# Patient Record
Sex: MALE | Race: WHITE | HISPANIC OR LATINO | Employment: FULL TIME | URBAN - METROPOLITAN AREA
[De-identification: names, ages, dates, MRNs, and addresses within clinical notes are randomized per-mention and may not be internally consistent; named-entity substitution may affect disease eponyms.]

---

## 2018-06-15 ENCOUNTER — TELEPHONE (OUTPATIENT)
Dept: TRANSPLANT | Facility: CLINIC | Age: 53
End: 2018-06-15

## 2018-06-15 NOTE — TELEPHONE ENCOUNTER
Received email communication from Jyoti Caal that the patient had a Tace on 6/11/2018.  Replied that the patient should have a follow up surveillance CT Scan one month later with labs including CMP, INR, and AFP.   CT scan imaging along with report should be sent to [facility] for review at IR conference.

## 2018-07-02 DIAGNOSIS — K70.30 ALCOHOLIC CIRRHOSIS OF LIVER WITHOUT ASCITES: ICD-10-CM

## 2018-07-02 DIAGNOSIS — C22.0 HCC (HEPATOCELLULAR CARCINOMA): Primary | ICD-10-CM

## 2018-07-02 DIAGNOSIS — Z76.82 ORGAN TRANSPLANT CANDIDATE: ICD-10-CM

## 2018-07-16 ENCOUNTER — SOCIAL WORK (OUTPATIENT)
Dept: TRANSPLANT | Facility: CLINIC | Age: 53
End: 2018-07-16
Payer: COMMERCIAL

## 2018-07-16 ENCOUNTER — LAB VISIT (OUTPATIENT)
Dept: LAB | Facility: HOSPITAL | Age: 53
End: 2018-07-16
Attending: INTERNAL MEDICINE
Payer: COMMERCIAL

## 2018-07-16 ENCOUNTER — OFFICE VISIT (OUTPATIENT)
Dept: TRANSPLANT | Facility: CLINIC | Age: 53
End: 2018-07-16
Payer: COMMERCIAL

## 2018-07-16 VITALS
RESPIRATION RATE: 16 BRPM | HEART RATE: 57 BPM | BODY MASS INDEX: 23.23 KG/M2 | HEIGHT: 72 IN | WEIGHT: 171.5 LBS | TEMPERATURE: 98 F | DIASTOLIC BLOOD PRESSURE: 71 MMHG | OXYGEN SATURATION: 100 % | SYSTOLIC BLOOD PRESSURE: 126 MMHG

## 2018-07-16 DIAGNOSIS — K70.30 ALCOHOLIC CIRRHOSIS OF LIVER WITHOUT ASCITES: ICD-10-CM

## 2018-07-16 DIAGNOSIS — Z76.82 ORGAN TRANSPLANT CANDIDATE: ICD-10-CM

## 2018-07-16 DIAGNOSIS — D73.1 THROMBOCYTOPENIA DUE TO HYPERSPLENISM: ICD-10-CM

## 2018-07-16 DIAGNOSIS — D69.59 THROMBOCYTOPENIA DUE TO HYPERSPLENISM: ICD-10-CM

## 2018-07-16 DIAGNOSIS — C22.0 HCC (HEPATOCELLULAR CARCINOMA): ICD-10-CM

## 2018-07-16 DIAGNOSIS — K76.6 PORTAL HYPERTENSION: ICD-10-CM

## 2018-07-16 DIAGNOSIS — C22.0 HCC (HEPATOCELLULAR CARCINOMA): Primary | ICD-10-CM

## 2018-07-16 LAB
A1AT SERPL-MCNC: 121 MG/DL
ABO + RH BLD: NORMAL
AFP SERPL-MCNC: 3.3 NG/ML
ALBUMIN SERPL BCP-MCNC: 2.8 G/DL
ALP SERPL-CCNC: 99 U/L
ALT SERPL W/O P-5'-P-CCNC: 62 U/L
AMPHET+METHAMPHET UR QL: NEGATIVE
ANION GAP SERPL CALC-SCNC: 7 MMOL/L
AST SERPL-CCNC: 86 U/L
BACTERIA #/AREA URNS AUTO: NORMAL /HPF
BARBITURATES UR QL SCN>200 NG/ML: NEGATIVE
BASOPHILS # BLD AUTO: 0.03 K/UL
BASOPHILS NFR BLD: 0.6 %
BENZODIAZ UR QL SCN>200 NG/ML: NEGATIVE
BILIRUB DIRECT SERPL-MCNC: 1.5 MG/DL
BILIRUB SERPL-MCNC: 3 MG/DL
BILIRUB UR QL STRIP: NEGATIVE
BLD GP AB SCN CELLS X3 SERPL QL: NORMAL
BUN SERPL-MCNC: 11 MG/DL
BZE UR QL SCN: NEGATIVE
CALCIUM SERPL-MCNC: 9.2 MG/DL
CANNABINOIDS UR QL SCN: NEGATIVE
CHLORIDE SERPL-SCNC: 98 MMOL/L
CLARITY UR REFRACT.AUTO: CLEAR
CO2 SERPL-SCNC: 28 MMOL/L
COLOR UR AUTO: ABNORMAL
COMPLEXED PSA SERPL-MCNC: 0.08 NG/ML
CREAT SERPL-MCNC: 1 MG/DL
CREAT UR-MCNC: 158 MG/DL
DIFFERENTIAL METHOD: ABNORMAL
EOSINOPHIL # BLD AUTO: 0.5 K/UL
EOSINOPHIL NFR BLD: 9.4 %
ERYTHROCYTE [DISTWIDTH] IN BLOOD BY AUTOMATED COUNT: 15.2 %
EST. GFR  (AFRICAN AMERICAN): >60 ML/MIN/1.73 M^2
EST. GFR  (NON AFRICAN AMERICAN): >60 ML/MIN/1.73 M^2
ESTIMATED AVG GLUCOSE: 174 MG/DL
ETHANOL UR-MCNC: <10 MG/DL
FERRITIN SERPL-MCNC: 57 NG/ML
GGT SERPL-CCNC: 189 U/L
GLUCOSE SERPL-MCNC: 347 MG/DL
GLUCOSE UR QL STRIP: ABNORMAL
HBA1C MFR BLD HPLC: 7.7 %
HBV CORE AB SERPL QL IA: NEGATIVE
HBV SURFACE AB SER-ACNC: NEGATIVE M[IU]/ML
HBV SURFACE AG SERPL QL IA: NEGATIVE
HCT VFR BLD AUTO: 37.5 %
HCV AB SERPL QL IA: NEGATIVE
HEPATITIS A ANTIBODY, IGG: POSITIVE
HGB BLD-MCNC: 12.8 G/DL
HGB UR QL STRIP: NEGATIVE
HIV 1+2 AB+HIV1 P24 AG SERPL QL IA: NEGATIVE
IMM GRANULOCYTES # BLD AUTO: 0.01 K/UL
IMM GRANULOCYTES NFR BLD AUTO: 0.2 %
INR PPP: 1.2
IRON SERPL-MCNC: 124 UG/DL
KETONES UR QL STRIP: NEGATIVE
LEUKOCYTE ESTERASE UR QL STRIP: NEGATIVE
LYMPHOCYTES # BLD AUTO: 0.5 K/UL
LYMPHOCYTES NFR BLD: 9.6 %
MCH RBC QN AUTO: 33 PG
MCHC RBC AUTO-ENTMCNC: 34.1 G/DL
MCV RBC AUTO: 97 FL
METHADONE UR QL SCN>300 NG/ML: NEGATIVE
MICROSCOPIC COMMENT: NORMAL
MONOCYTES # BLD AUTO: 0.5 K/UL
MONOCYTES NFR BLD: 10 %
NEUTROPHILS # BLD AUTO: 3.4 K/UL
NEUTROPHILS NFR BLD: 70.2 %
NITRITE UR QL STRIP: NEGATIVE
NRBC BLD-RTO: 0 /100 WBC
OPIATES UR QL SCN: NEGATIVE
PCP UR QL SCN>25 NG/ML: NEGATIVE
PH UR STRIP: 6 [PH] (ref 5–8)
PLATELET # BLD AUTO: 49 K/UL
PMV BLD AUTO: 11.8 FL
POTASSIUM SERPL-SCNC: 3.9 MMOL/L
PROT SERPL-MCNC: 6 G/DL
PROT UR QL STRIP: NEGATIVE
PROTHROMBIN TIME: 12.4 SEC
RBC # BLD AUTO: 3.88 M/UL
RBC #/AREA URNS AUTO: 3 /HPF (ref 0–4)
SATURATED IRON: 35 %
SODIUM SERPL-SCNC: 133 MMOL/L
SP GR UR STRIP: 1.03 (ref 1–1.03)
TOTAL IRON BINDING CAPACITY: 351 UG/DL
TOXICOLOGY INFORMATION: NORMAL
TRANSFERRIN SERPL-MCNC: 237 MG/DL
TSH SERPL DL<=0.005 MIU/L-ACNC: 1.07 UIU/ML
URN SPEC COLLECT METH UR: ABNORMAL
UROBILINOGEN UR STRIP-ACNC: 4 EU/DL
WBC # BLD AUTO: 4.81 K/UL
WBC #/AREA URNS AUTO: 0 /HPF (ref 0–5)
YEAST UR QL AUTO: NORMAL

## 2018-07-16 PROCEDURE — 83540 ASSAY OF IRON: CPT | Mod: TXP

## 2018-07-16 PROCEDURE — 99205 OFFICE O/P NEW HI 60 MIN: CPT | Mod: S$GLB,TXP,, | Performed by: INTERNAL MEDICINE

## 2018-07-16 PROCEDURE — 82977 ASSAY OF GGT: CPT | Mod: TXP

## 2018-07-16 PROCEDURE — 82103 ALPHA-1-ANTITRYPSIN TOTAL: CPT | Mod: TXP

## 2018-07-16 PROCEDURE — 86787 VARICELLA-ZOSTER ANTIBODY: CPT | Mod: TXP

## 2018-07-16 PROCEDURE — 86706 HEP B SURFACE ANTIBODY: CPT | Mod: TXP

## 2018-07-16 PROCEDURE — 86592 SYPHILIS TEST NON-TREP QUAL: CPT | Mod: TXP

## 2018-07-16 PROCEDURE — 80053 COMPREHEN METABOLIC PANEL: CPT | Mod: TXP

## 2018-07-16 PROCEDURE — 84443 ASSAY THYROID STIM HORMONE: CPT | Mod: TXP

## 2018-07-16 PROCEDURE — 82728 ASSAY OF FERRITIN: CPT | Mod: TXP

## 2018-07-16 PROCEDURE — 86790 VIRUS ANTIBODY NOS: CPT | Mod: TXP

## 2018-07-16 PROCEDURE — 99999 PR PBB SHADOW E&M-EST. PATIENT-LVL III: CPT | Mod: PBBFAC,TXP,, | Performed by: INTERNAL MEDICINE

## 2018-07-16 PROCEDURE — 84153 ASSAY OF PSA TOTAL: CPT | Mod: TXP

## 2018-07-16 PROCEDURE — 86850 RBC ANTIBODY SCREEN: CPT | Mod: TXP

## 2018-07-16 PROCEDURE — 82248 BILIRUBIN DIRECT: CPT | Mod: TXP

## 2018-07-16 PROCEDURE — 86644 CMV ANTIBODY: CPT | Mod: TXP

## 2018-07-16 PROCEDURE — 80321 ALCOHOLS BIOMARKERS 1OR 2: CPT | Mod: TXP

## 2018-07-16 PROCEDURE — 82105 ALPHA-FETOPROTEIN SERUM: CPT | Mod: TXP

## 2018-07-16 PROCEDURE — 86682 HELMINTH ANTIBODY: CPT | Mod: TXP

## 2018-07-16 PROCEDURE — 86682 HELMINTH ANTIBODY: CPT | Mod: 91,TXP

## 2018-07-16 PROCEDURE — 81001 URINALYSIS AUTO W/SCOPE: CPT | Mod: TXP

## 2018-07-16 PROCEDURE — 85025 COMPLETE CBC W/AUTO DIFF WBC: CPT | Mod: TXP

## 2018-07-16 PROCEDURE — 87340 HEPATITIS B SURFACE AG IA: CPT | Mod: TXP

## 2018-07-16 PROCEDURE — 86703 HIV-1/HIV-2 1 RESULT ANTBDY: CPT | Mod: TXP

## 2018-07-16 PROCEDURE — 83036 HEMOGLOBIN GLYCOSYLATED A1C: CPT | Mod: TXP

## 2018-07-16 PROCEDURE — 85610 PROTHROMBIN TIME: CPT | Mod: TXP

## 2018-07-16 PROCEDURE — 86803 HEPATITIS C AB TEST: CPT | Mod: TXP

## 2018-07-16 PROCEDURE — 82104 ALPHA-1-ANTITRYPSIN PHENO: CPT | Mod: TXP

## 2018-07-16 PROCEDURE — 86704 HEP B CORE ANTIBODY TOTAL: CPT | Mod: TXP

## 2018-07-16 PROCEDURE — 36415 COLL VENOUS BLD VENIPUNCTURE: CPT | Mod: TXP

## 2018-07-16 PROCEDURE — 80307 DRUG TEST PRSMV CHEM ANLYZR: CPT | Mod: TXP

## 2018-07-16 RX ORDER — LACTULOSE 10 G/15ML
SOLUTION ORAL 2 TIMES DAILY
COMMUNITY
End: 2019-01-09 | Stop reason: SDUPTHER

## 2018-07-16 RX ORDER — FUROSEMIDE 40 MG/1
40 TABLET ORAL DAILY
Status: ON HOLD | COMMUNITY
End: 2019-05-04 | Stop reason: SDUPTHER

## 2018-07-16 RX ORDER — INSULIN GLARGINE 100 [IU]/ML
5 INJECTION, SOLUTION SUBCUTANEOUS NIGHTLY
Status: ON HOLD | COMMUNITY
End: 2019-05-04 | Stop reason: SDUPTHER

## 2018-07-16 RX ORDER — NADOLOL 20 MG/1
20 TABLET ORAL DAILY
Status: ON HOLD | COMMUNITY
End: 2019-04-29 | Stop reason: HOSPADM

## 2018-07-16 RX ORDER — SPIRONOLACTONE 100 MG/1
100 TABLET, FILM COATED ORAL DAILY
Status: ON HOLD | COMMUNITY
End: 2019-04-29 | Stop reason: HOSPADM

## 2018-07-16 NOTE — PROGRESS NOTES
Transplant Recipient Adult Psychosocial Assessment-Utilizing  Services through [facility]    [place] [address]    Telephone Information:   Mobile [phone]   Home  [phone] (home)  Work  There is no work phone number on file.  E-mail  [email or web address]    Sex: male  YOB: 1965  Age: 52 y.o.    Encounter Date: 7/16/2018  [place] Citizen: yes  Primary Language: Togolese   Needed: yes; Pt and wife present along with translators from [facility]:  Lili    Emergency Contact:  Name: Rd Rogers  Relationship: wife  Address: same as patient  Phone Numbers:  [phone] (mobile)    Family/Social Support:   Number of dependents/: 0  Marital history: Pt has been  once to his wife Rd for approximately 27 years.  Other family dynamics: Pt and wife have one daughter Kellie Carr who is 24 years old.  Pt's mother passed away in 2012 at the age of 75 from heart disease.  Pt's father passed away in 2010 from complications associated with diabetes.  Pt has a total of 9 siblings with two of them living in the [facility] (1 sister Donita lives in [place], [place] and 1 brother Moo lives in [place]).  Pt also has one grandson who is 4 years old who's mother is the pt's daughter and lives with his mother within the pt's home.      Household Composition:  Name: Cesario Bailey  Age: 52  Relationship: patient  Does person drive? yes    Name: Rd Rogers   Age: n/a  Relationship: wife  Does person drive? yes    Name: Kellie Carr  Age: 24  Relationship: daughter  Does person drive? yes     Name: Toro Marrero  Age: 4  Relationship: grandson  Does person drive? no    Do you and your caregivers have access to reliable transportation? Yes;  while here in [place], pt and his wife will utilize taxi/uber services.  PRIMARY CAREGIVER:  Rd Rogers will be primary caregiver, phone number [phone].      provided in-depth information to patient and caregiver regarding pre- and post-transplant caregiver role.   strongly encourages patient and caregiver to have concrete plan regarding post-transplant care giving, including back-up caregiver(s) to ensure care giving needs are met as needed.    Patient and Caregiver states understanding all aspects of caregiver role/commitment and is able/willing/committed to being caregiver to the fullest extent necessary.    Patient and Caregiver verbalizes understanding of the education provided today and caregiver responsibilities.         remains available. Patient and Caregiver agree to contact  in a timely manner if concerns arise.      Able to take time off work without financial concerns: yes; pt stated he has accumulated over 60 days of vacation time along with sick time and 18 free days given by his employer.  Pt's wife stated she will be able to a leave from her job without incident.     Additional Significant Others who will Assist with Transplant:  Pt does not currently have a secondary caregiver plan.   encouraged him to speak with family/friends who would be able to travel to [place], if needed, to assist his primary caregiver in his care and contact  with the information as soon as obtained.  Later during the day, Tyesha of [facility] contacted  to report secondary caregiver information.  Pt advised Tyesha that his mother in law would be able to assist in his care as secondary caregiver.  However, Tyesha indicated the pt stated he would need to contact her back with his mother in law's telephone number and information.  Tyesha was asked to contact the  once the pt had provided all information as requested.     Living Will: no  Healthcare Power of : no  Advance Directives on file: <<no information> per medical  record.  Verbally reviewed LW/HCPA information.   provided patient with copy of LW/HCPA documents and provided education on completion of forms.    Living Donors: No.    Highest Education Level: High School (9-12) or GED; pt completed the 12th grade of high school  Reading Ability: 12th grade; pt explained he could read on a 12th grade and above grade level  Reports difficulty with: vision (pt wears readers); confusion when amonia levels are high  Learns Best By:  Verbal, reading and demonstration     Status: no  VA Benefits: no     Working for Income: yes  If yes, working activity level: Working Full Time  Patient is employed as a  with the [employer].  Pt has been employed with the department for 32 years.    Spouse/Significant Other Employment: Rd Rogers is employed as a  with the [employer].    Disabled: no    Monthly Income:  Salary/Wages: $3138  Able to afford all costs now and if transplanted, including medications: yes  Patient and Caregiver verbalizes understanding of personal responsibilities related to transplant costs and the importance of having a financial plan to ensure that patients transplant costs are fully covered.      provided fundraising information/education.  Patient and Caregiver verbalizes understanding.   remains available.    Insurance:   Payor/Plan Subscr  Sex Relation Sub. Ins. ID Effective Group Num   1. [employer]* GIOVANNAMARTINA GATICAL 1965 Male  [id number]                                    ATTN:MEENU MARES, [address]     Primary Insurance (for UNOS reporting): Private Insurance-Pending sale to [facility]  Secondary Insurance (for UNOS reporting): None  Patient and Caregiver verbalizes clear understanding that patient may experience difficulty obtaining and/or be denied insurance coverage post-surgery. This includes and is not  "limited to disability insurance, life insurance, health insurance, burial insurance, long term care insurance, and other insurances.    Patient and Caregiver also reports understanding that future health concerns related to or unrelated to transplantation may not be covered by patient's insurance.  Resources and information provided and reviewed.      Patient and Caregiver provides verbal permission to release any necessary information to outside resources for patient care and discharge planning.  Resources and information provided are reviewed.      Dialysis Adherence:  Patient reports denied receiving dialysis.     Infusion Service: patient utilizing? no  Home Health: patient utilizing? no  DME: no  Pulmonary/Cardiac Rehab: no   ADLS:  Pt is fully independent and is currently driving    Adherence:   Pt and wife agree pt maintains a positive adherence with all medical instructions, advice and/or medication regimens.  Adherence education and counseling provided.     Per History Section:  No past medical history on file.  Social History   Substance Use Topics    Smoking status: Not on file    Smokeless tobacco: Not on file    Alcohol use Not on file     History   Drug use: Unknown     History   Sexual Activity    Sexual activity: Not on file       Per Today's Psychosocial:  Tobacco: none, patient denies any use. Pt denies ever smoking cigarettes.  Alcohol: none, patient denies any use currently.  Pt's last drink was 5 years ago.  Pt began drinking at age 23 which consisted of at least 24 cans of beer and a pint of rum daily.  Pt stated he drank an enormous amount of beer until 5 years ago when he began to feel ill.  "I stopped drinking before my diagnosis as I did not feel well a lot of the times".  Pt denied receiving any treatment to aide him in sobriety.   Illicit Drugs/Non-prescribed Medications: none, patient denies any use.    Patient and Caregiver states clear understanding of the potential impact of " "substance use as it relates to transplant candidacy and is aware of possible random substance screening.  Substance abstinence/cessation counseling, education and resources provided and reviewed.     Arrests/DWI/Treatment/Rehab: yes    Psychiatric History:    Mental Health: Pt denied any mental health issues or concerns  Psychiatrist/Counselor: Pt stated his ammonia levels caused him to act "loopy" on the job once and he was referred to [facility] for counseling to rule out mental health problems.  Pt stated he attended four therapy sessions and was deemed competent once his job verified his condition was deemed medically induced.    Medications:  Pt denied being prescribed any psychotropics/anti-depressants  Suicide/Homicide Issues: Pt denied current/past SI/HI ideations   Safety at home: Pt denied any domestic or safety issues within his home setting.    Knowledge: Patient and Caregiver states having clear understanding and realistic expectations regarding the potential risks and potential benefits of organ transplantation and organ donation, agrees to discuss with health care team members and support system members and to utilize available resources and express questions and/or concerns in order to further facilitate the pt informed decision-making.  Resources and information provided and reviewed.     Patient and Caregiver is aware of [facility]'s affiliation and/or partnership with agencies in home health care, LTAC, SNF, [facility], and other hospitals and clinics.    Understanding: Patient and Caregiver reports having a clear understanding of the many lifetime commitments involved with being a transplant recipient, including costs, compliance, medications, lab work, procedures, appointments, concrete and financial planning, preparedness, timely and appropriate communication of concerns, abstinence (ETOH, tobacco, illicit non-prescribed drugs), adherence to all health care team recommendations, support system and caregiver " "involvement, appropriate and timely resource utilization and follow-through, mental health counseling as needed/recommended, and patient and caregiver responsibilities.  Social Service Handbook, resources and detailed educational information provided and reviewed.  Educational information provided.    Patient and Caregiver also reports current and expected compliance with health care regime and states having a clear understanding of the importance of compliance.      Patient and Caregiver reports a clear understanding that risks and benefits may be involved with organ transplantation and with organ donation.      Patient and Caregiver also reports clear understanding that psychosocial risk factors may affect patient, and include but are not limited to feelings of depression, generalized anxiety, anxiety regarding dependence on others, post traumatic stress disorder, feelings of guilt and other emotional and/or mental concerns, and/or exacerbation of existing mental health concerns.  Detailed resources provided and discussed.     Patient and Caregiver agrees to access appropriate resources in a timely manner as needed and/or as recommended, and to communicate concerns appropriately.  Patient and Caregiver also reports a clear understanding of treatment options available.      reviewed education, provided additional information, and answered questions.    Feelings or Concerns: "My concern is I have something I did not have wrong with me before-my liver.  I am concerned, but have no feelings about it as I understand my illness".    Coping: "I am very aware that my behavior contributed to my condition".  Pt reported he does not get sad or unhappy about his condition. As per pt's wife, "He is clinging to life and his grandson is giving him reason to live".    Goals: "Hope to God I can spend more time at home with my family/grandson and drink more water, soda and lemon juice!"  Patient referred to Vocational " Rehabilitation.    Interview Behavior: Patient and Caregiver presents as alert and oriented x 4, pleasant, good eye contact, well groomed, recall good, concentration/judgement good, average intelligence, calm, communicative, cooperative and asking and answering questions appropriately.          Transplant Social Work - Candidacy  Assessment/Plan:     Psychosocial Suitability: Suitability cannot be determined at this time as back-up caregiver plan is still pending. Once adequate backup caregiver plan confirmed, patient would be considered as a suitable candidate presenting as low risk.  Protective factors include:  Financial stability, no reported history of ETOH use in the last 5 years, and positive determination on continued sobriety.      Recommendations/Additional Comments:   -Secondary caregiver   -Local Lodging      Kenya Quintana LMSW

## 2018-07-16 NOTE — PROGRESS NOTES
Transplant Hepatology (Transplant Evaluation) Consult Note    Referring provider:   Chief complaint:   Chief Complaint   Patient presents with    Liver Transplant Pre-evaluation       HPI:  Cesario Bailey is a 52 y.o. male that presents to Transplant Hepatology Clinic for consultation of had no chief complaint listed for this encounter..  He  is accompanied by his wife.    Background  Patient was diagnosed with alcohol-induced cirrhosis about 5 years ago. He stopped drinking since. He developed ascites, that resolved later. He had an episode variceal bleed in 2016 - EVL done. Has been on nadolol since.   Liver decompensations have been controlled but developed HCC on surveillance screening - 2.1 cm with HCC features. He had TACE on 6/11/18 in [place].    PMH: DM2, denies HTN/CAD/heart problem  PSH: umbilical hernia repair x 2.     Alcohol: stopped alcohol use completely 5 years ago, used to drink a lot for many years. Rehab: no. Relapse: no.  Tobacco: no, never  THC: no  IVDU: no  Cocaine: no    Work: Police in [place]  Family: , 1 daughter: 23 y/o    MELD-Na score: 17 at 7/16/2018 11:51 AM  MELD score: 13 at 7/16/2018 11:51 AM  Calculated from:  Serum Creatinine: 1 mg/dL at 7/16/2018 11:51 AM  Serum Sodium: 133 mmol/L at 7/16/2018 11:51 AM  Total Bilirubin: 3 mg/dL at 7/16/2018 11:51 AM  INR(ratio): 1.2 at 7/16/2018 11:51 AM  Age: 52 years    Liver disease:                   Alcohol/HCC    Blood type:                       A+  HCC  2.1 cm  TACE in [place] - 6/11/18      MELD-Na:                         17  Child-Tidwell Class:             B    Transplant status:             not under evaluation    Cirrhosis is decompensated with:    Ascites:                                                      Yes, controlled on diuretics  Spontaneous bacterial peritonitis:              no  Hepatic Encephalopathy:                           Yes, grade 1  Portal bleeding:                                          yes  Hepatocellular  carcinoma:                          yes    Hepatorenal syndrome:                              no  Hyponatremia:                                            yes  Muscle wasting:                                          yes   Portal vein thrombosis:                               no    Screening:  Last EGD:  Last imaging study:         Patient Active Problem List   Diagnosis    HCC (hepatocellular carcinoma)    Alcoholic cirrhosis of liver without ascites    Organ transplant candidate    Portal hypertension    Thrombocytopenia due to hypersplenism       Past Medical History:   Diagnosis Date    Alcoholic cirrhosis     HCC (hepatocellular carcinoma)     Portal hypertension        No past surgical history on file.    No family history on file.    Social History     Social History    Marital status:      Spouse name: N/A    Number of children: N/A    Years of education: N/A     Social History Main Topics    Smoking status: Not on file    Smokeless tobacco: Not on file    Alcohol use Not on file    Drug use: Unknown    Sexual activity: Not on file     Other Topics Concern    Not on file     Social History Narrative    No narrative on file       Current Outpatient Prescriptions   Medication Sig Dispense Refill    furosemide (LASIX) 40 MG tablet Take 40 mg by mouth once daily.      insulin glargine (LANTUS) 100 unit/mL injection Inject 5 Units into the skin every evening.      insulin regular 100 unit/mL Inj injection Inject 20 Units into the skin 3 (three) times daily before meals.      lactulose (CHRONULAC) 20 gram/30 mL Soln 2 (two) times daily.      nadolol (CORGARD) 20 MG tablet Take 20 mg by mouth once daily.      rifAXIMin (XIFAXAN) 550 mg Tab Take 550 mg by mouth 2 (two) times daily.      spironolactone (ALDACTONE) 100 MG tablet Take 100 mg by mouth once daily.       No current facility-administered medications for this visit.        Review of patient's allergies indicates:   Allergen  Reactions    Aspirin             Vitals:    07/16/18 1259   BP: 126/71   Pulse: (!) 57   Resp: 16   Temp: 98.1 °F (36.7 °C)   TempSrc: Oral   SpO2: 100%   Weight: 77.8 kg (171 lb 8.3 oz)   Height: 6' (1.829 m)       Physical Exam   Constitutional: He is oriented to person, place, and time. He appears well-developed. No distress.   Chronically ill-appearing. Malnourished. Temporal wasting.     HENT:   Head: Normocephalic and atraumatic.   Mouth/Throat: Oropharynx is clear and moist.   Eyes: Conjunctivae are normal. No scleral icterus.   Neck: Normal range of motion.   Cardiovascular: Normal rate, regular rhythm, normal heart sounds and intact distal pulses.    Pulmonary/Chest: Effort normal and breath sounds normal. No respiratory distress. He has no wheezes. He has no rales.   Abdominal: Soft. Normal appearance and bowel sounds are normal. He exhibits no shifting dullness, no distension, no pulsatile liver, no fluid wave and no ascites. There is no splenomegaly or hepatomegaly. No hernia.   Musculoskeletal: He exhibits no edema.   Neurological: He is alert and oriented to person, place, and time. He is not disoriented.   Skin: Skin is warm and dry. No rash noted. He is not diaphoretic.   Psychiatric: He has a normal mood and affect. His behavior is normal. His mood appears not anxious. His affect is not inappropriate. He is not agitated. He is communicative. He is attentive.   Nursing note and vitals reviewed.      LABS: I personally reviewed pertinent laboratory findings.    Lab Results   Component Value Date    ALT 62 (H) 07/16/2018    AST 86 (H) 07/16/2018     (H) 07/16/2018    ALKPHOS 99 07/16/2018    BILITOT 3.0 (H) 07/16/2018       Lab Results   Component Value Date    WBC 4.81 07/16/2018    HGB 12.8 (L) 07/16/2018    HCT 37.5 (L) 07/16/2018    MCV 97 07/16/2018    PLT 49 (L) 07/16/2018       Lab Results   Component Value Date     (L) 07/16/2018    K 3.9 07/16/2018    CL 98 07/16/2018    CO2 28  07/16/2018    BUN 11 07/16/2018    CREATININE 1.0 07/16/2018    CALCIUM 9.2 07/16/2018    ANIONGAP 7 (L) 07/16/2018    ESTGFRAFRICA >60.0 07/16/2018    EGFRNONAA >60.0 07/16/2018       Lab Results   Component Value Date    INR 1.2 07/16/2018       No results found for: SMOOTHMUSCAB, MITOAB  Lab Results   Component Value Date    IRON 124 07/16/2018    TIBC 351 07/16/2018    FERRITIN 57 07/16/2018     Lab Results   Component Value Date    HEPBCAB Negative 07/16/2018    HEPCAB Negative 07/16/2018     Lab Results   Component Value Date    TSH 1.066 07/16/2018     No results found for: BRIGETTE    No results found for: ABORH        Lab Results   Component Value Date    HGBA1C 7.7 (H) 07/16/2018     No results found for: CHOL  No results found for: HDL  No results found for: LDLCALC  No results found for: TRIG  No results found for: CHOLHDL        Imaging:   I personally reviewed recent cardiology studies available on the chart and from outside medical records.    I personally reviewed recent imaging studies available on the chart and from outside medical records.        Assessment:  52 y.o. male presenting with     1. HCC (hepatocellular carcinoma)    2. Alcoholic cirrhosis of liver without ascites    3. Organ transplant candidate    4. Portal hypertension    5. Thrombocytopenia due to hypersplenism        MELD-Na score: 17 at 7/16/2018 11:51 AM  MELD score: 13 at 7/16/2018 11:51 AM  Calculated from:  Serum Creatinine: 1 mg/dL at 7/16/2018 11:51 AM  Serum Sodium: 133 mmol/L at 7/16/2018 11:51 AM  Total Bilirubin: 3 mg/dL at 7/16/2018 11:51 AM  INR(ratio): 1.2 at 7/16/2018 11:51 AM  Age: 52 years    Functional Status: 70% - Cares for self: unable to carry on normal activity or active work  Physical Capacity: No Limitations    Transplant Candidacy: Patient is a 52 y.o. male with end-stage liver disease secondary to Cirrhosis: Alcohol/HCC with MELD-Na: 17 and Child-Tidwell Class:B  here for evaluation for possible OLT. Based on  available information, patient is a potential liver transplant candidate. Patient will undergo liver transplant evaluation after financial approval is obtained. Patient will be presented to Liver Transplant Selection Committee after all tests and evaluations are complete.    Return in 3 months    Patient was advised, in presence of caregiver, not to consume any contents which contain alcohol:  Not to use any mouthwash which contains alcohol  To avoid any alcohol-containing OTC medications use  Not to consume Non-Alcoholic Beer as there is a small amount of alcohol in those beverages  Not to cook with any wine or alcohol.  Advised if patients tested positive for alcohol, they would be denied for liver transplant.  Advised all patients are randomly screen for illegal drugs and alcohol. Failure of compliance may result in denial for liver transplant.    Cirrhosis Counseling  - strict abstinence of alcohol use  - compliance with lactulose and rifaximin if you have developed hepatic encephalopathy (confusion due to high ammonia level)  - avoid non-steroidal anti-inflammatory drugs (NSAIDs) such as ibuprofen, Motrin, naprosyn, Alleve due to the risk of kidney damage  - can take acetaminophen (Tylenol), no more than 2000 mg per day  - low sodium (salt) 2 gram per day diet  - nutrition: 25-30kcal (calorie per body body weight in kilogram) per day  - no need to restrict protein in diet  - high protein diet: 1.2-1.5 gram/kg (protein per body weight in kilogram) per day to prevent muscle mass loss  - avoid fasting  - Late night snack with 50 gram of complex carbohydrates and protein  - resistance exercises for muscle strength  - avoid raw seafoods due to the risk of fatal Vibrio vulnificus infection  - ultrasound or MRI of the liver every 6 months for liver cancer screening (you are at risk of developing liver cancer due to scar tissue in the liver)  - Upper endoscopy every 1-2 years to screen for varices in the stomach and  foodpipe which can burst and cause fatal bleeding      I have sent communication to the referring physician and/or primary care provider.    A total of 60 minutes were spent face-to-face with the patient during this encounter and over half of that time was spent on counseling and coordination of care.  We discussed in depth the nature of the patient's liver disease, the management plan and liver transplant evaluation in details. I also educated the patient about lifestyle modifications which may improve hepatic steatosis, overweight/obesity, insulin resistance and high blood pressure issues. I have provided the patient with an opportunity to ask questions and have all questions answered to his satisfaction.       Oni Faulkner MD  Staff Physician  Hepatology and Liver Transplant  [facility]  [facility]

## 2018-07-16 NOTE — PATIENT INSTRUCTIONS
Cirrhosis Counseling  - strict abstinence of alcohol use  - compliance with lactulose and rifaximin if you have developed hepatic encephalopathy (confusion due to high ammonia level)  - avoid non-steroidal anti-inflammatory drugs (NSAIDs) such as ibuprofen, Motrin, naprosyn, Alleve due to the risk of kidney damage  - can take acetaminophen (Tylenol), no more than 2000 mg per day  - low sodium (salt) 2 gram per day diet  - nutrition: 25-30kcal (calorie per body body weight in kilogram) per day  - no need to restrict protein in diet  - high protein diet: 1.2-1.5 gram/kg (protein per body weight in kilogram) per day to prevent muscle mass loss: 75 grams of protein per day  - avoid fasting  - Late night snack with 50 gram of complex carbohydrates and protein  - resistance exercises for muscle strength  - avoid raw seafoods due to the risk of fatal Vibrio vulnificus infection  - CT or MRI of the liver every 6 months for liver cancer screening (you are at risk of developing liver cancer due to scar tissue in the liver)  - Upper endoscopy every 1-2 years to screen for varices in the stomach and foodpipe which can burst and cause fatal bleeding

## 2018-07-17 ENCOUNTER — TELEPHONE (OUTPATIENT)
Dept: TRANSPLANT | Facility: CLINIC | Age: 53
End: 2018-07-17

## 2018-07-17 ENCOUNTER — NUTRITION (OUTPATIENT)
Dept: TRANSPLANT | Facility: CLINIC | Age: 53
End: 2018-07-17
Payer: COMMERCIAL

## 2018-07-17 ENCOUNTER — HOSPITAL ENCOUNTER (OUTPATIENT)
Dept: RADIOLOGY | Facility: CLINIC | Age: 53
Discharge: HOME | End: 2018-07-17
Attending: INTERNAL MEDICINE
Payer: COMMERCIAL

## 2018-07-17 VITALS — HEIGHT: 66 IN | BODY MASS INDEX: 26.65 KG/M2 | WEIGHT: 165.81 LBS

## 2018-07-17 DIAGNOSIS — Z76.82 ORGAN TRANSPLANT CANDIDATE: ICD-10-CM

## 2018-07-17 DIAGNOSIS — K70.30 ALCOHOLIC CIRRHOSIS OF LIVER WITHOUT ASCITES: ICD-10-CM

## 2018-07-17 DIAGNOSIS — C22.0 HCC (HEPATOCELLULAR CARCINOMA): ICD-10-CM

## 2018-07-17 DIAGNOSIS — Z01.818 PRE-TRANSPLANT EVALUATION FOR LIVER TRANSPLANT: ICD-10-CM

## 2018-07-17 DIAGNOSIS — C22.0 HCC (HEPATOCELLULAR CARCINOMA): Primary | ICD-10-CM

## 2018-07-17 LAB
CMV IGG SERPL QL IA: NORMAL
RPR SER QL: NORMAL

## 2018-07-17 PROCEDURE — 77080 DXA BONE DENSITY AXIAL: CPT | Mod: 26,TXP,, | Performed by: INTERNAL MEDICINE

## 2018-07-17 PROCEDURE — 97802 MEDICAL NUTRITION INDIV IN: CPT | Mod: S$GLB,TXP,, | Performed by: DIETITIAN, REGISTERED

## 2018-07-17 PROCEDURE — 99999 PR PBB SHADOW E&M-EST. PATIENT-LVL I: CPT | Mod: PBBFAC,TXP,, | Performed by: DIETITIAN, REGISTERED

## 2018-07-17 PROCEDURE — 77080 DXA BONE DENSITY AXIAL: CPT | Mod: TC,TXP

## 2018-07-17 NOTE — TELEPHONE ENCOUNTER
(GENI) received a phone call from Tyesha of the [facility] [employer] assisting with translation regarding the patient's (pt) secondary caregiver information.  As per pt, secondary caregiver will be his mother-in-law Jessika Godinez ([phone]).  Tyesha attempted to contact Ms. Godinez by telephone and was able to leave a v/m asking that she contact Tyesha with regards to the pt.  Tyesha advised she would contact GENI when Ms. Godinez returned the call.  GENI remains available.

## 2018-07-17 NOTE — PROGRESS NOTES
"TRANSPLANT NUTRITIONAL ASSESSMENT    Referring Provider: Oni Faulkner MD    Reason for Visit: Pre-liver transplant work-up    Age: 52 y.o.  Sex: male    Patient Active Problem List   Diagnosis    HCC (hepatocellular carcinoma)    Alcoholic cirrhosis of liver without ascites    Organ transplant candidate    Portal hypertension    Thrombocytopenia due to hypersplenism     Past Medical History:   Diagnosis Date    Alcoholic cirrhosis     HCC (hepatocellular carcinoma)     Portal hypertension      Lab Results   Component Value Date     (H) 07/16/2018    K 3.9 07/16/2018    ALBUMIN 2.8 (L) 07/16/2018    HGBA1C 7.7 (H) 07/16/2018    CALCIUM 9.2 07/16/2018     Other Pertinent Labs: none    Current Outpatient Prescriptions   Medication Sig    furosemide (LASIX) 40 MG tablet Take 40 mg by mouth once daily.    insulin glargine (LANTUS) 100 unit/mL injection Inject 5 Units into the skin every evening.    insulin regular 100 unit/mL Inj injection Inject 20 Units into the skin 3 (three) times daily before meals.    lactulose (CHRONULAC) 20 gram/30 mL Soln 2 (two) times daily.    nadolol (CORGARD) 20 MG tablet Take 20 mg by mouth once daily.    rifAXIMin (XIFAXAN) 550 mg Tab Take 550 mg by mouth 2 (two) times daily.    spironolactone (ALDACTONE) 100 MG tablet Take 100 mg by mouth once daily.     No current facility-administered medications for this visit.      Allergies: Aspirin    Ht Readings from Last 1 Encounters:   07/17/18 5' 6.42" (1.687 m)     Wt Readings from Last 1 Encounters:   07/17/18 75.2 kg (165 lb 12.6 oz)      BMI: Body mass index is 26.42 kg/m².    Usual Weight: 165-170 lb  Weight Change/Time: about 5 yr at time of illness discovery/onset, pt wt around 220 lb, currently stable  Current Diet: Regular  Appetite/Current Intake: good   Exercise/Physical Activity: functional, no limitations, no exercise  Nutritional/Herbal Supplements: multivitamin  Chewing/Swallowing Problems: none  Symptoms: " none    Estimated Kcal Need: 9526-9249 kcal (25-30 kcal/kg)  Estimated Protein Need: 75-90 gm (1-1.2 gm/kg)    Nutritional History:   Pt present with spouse, international department  present as well. Pt has had generally stable weight, good appetite the past few years. Pt has tried to reduce sodium intake, control BG at home. BG runs 140-150 usually. Pt has had issues with ascites and edema in past but it is better controlled now. Pt has reduced portion size of food he eats. Pt was continuing to work before coming here for evaluation. Pt goes to work at 3:30am, he usually eats something around 5:30 am. This is usually from a place like [employer] where he will have a turkey sandwich w/ lettuce, tomato, onion and drink water  Or coffee w/ 1 sugar substitute and milk. On days he is off work he may sleep later and have eggs, ham, peppers, bread or oatmeal with milk when he wakes up.   Lunch may be skipped.   Snack is usually fresh fruit (apple, banana, plum, grapes)  2:30 pm meal: rice, meat, beans, vegetables; meat may be chicken, pork, beef, sometimes plantains too. They go out for a buffet about 1x every 2 months. Spouse cooks and pt cooks meals. They use a very little amount of salt in cooking, no adding salt to plate. Vegetables used are typically fresh with herbs and spices.   Snack before bed may be yogurt, fruit, cookie, cheese.   Beverages throughout the day include water, occasional soda.     Nutritional Diagnoses  Problem: food- and nutrition-related knowledge deficit  Etiology: r/t no prior edu on adequate protein intake per day  Symptoms: aeb questions from pt/caregiver    Educational Need? yes  Barriers: none identified  Discussed with: patient and spouse  Interventions: Patient taught nutrition information regarding Pre-liver transplant work-up.    Reviewed Low Sodium packet (low Na diet, foods recommended/not recommended, food label strategies, flavoring tips, & sample menu). (Lithuanian)  Provided  education on protein content in foods, goal intake per day, suggestions for ways to reach protein intake goal. (Syriac)  Monitor and control BG closely with diet and medication.  Stay active walking daily.   Goals/Recommendations: diet adherence and small frequent meals and snacks  Actions Taken: instruct/provide written information  Patient and/or family comprehend instructions: yes  Outcome: Verbalizes understanding  Monitoring: Follow up in clinic if needed. RD contact info provided.    Counseling Time: 30 minutes

## 2018-07-17 NOTE — TELEPHONE ENCOUNTER
(GENI) contacted Tyesha ([id number]) within the International Department of [facility] to inquire if the patient (pt) had called with an update on secondary caregiver information.  Tyesha advised the pt stated on yesterday he would visit her office later today to provide his mother in law's information and have Tyesha to contact the SW with mother in law on the line to assist in translation.  Tyesha stated she would contact SW later today to assist with call. SW remains available.

## 2018-07-18 ENCOUNTER — HOSPITAL ENCOUNTER (OUTPATIENT)
Dept: RADIOLOGY | Facility: HOSPITAL | Age: 53
Discharge: HOME | End: 2018-07-18
Attending: INTERNAL MEDICINE
Payer: COMMERCIAL

## 2018-07-18 ENCOUNTER — TELEPHONE (OUTPATIENT)
Dept: HEPATOLOGY | Facility: CLINIC | Age: 53
End: 2018-07-18

## 2018-07-18 ENCOUNTER — LAB VISIT (OUTPATIENT)
Dept: LAB | Facility: HOSPITAL | Age: 53
End: 2018-07-18
Attending: INTERNAL MEDICINE
Payer: COMMERCIAL

## 2018-07-18 DIAGNOSIS — K70.30 ALCOHOLIC CIRRHOSIS OF LIVER WITHOUT ASCITES: ICD-10-CM

## 2018-07-18 DIAGNOSIS — Z76.82 ORGAN TRANSPLANT CANDIDATE: ICD-10-CM

## 2018-07-18 DIAGNOSIS — C22.0 HCC (HEPATOCELLULAR CARCINOMA): ICD-10-CM

## 2018-07-18 LAB
ABO + RH BLD: NORMAL
BLD GP AB SCN CELLS X3 SERPL QL: NORMAL
STRONGYLOIDES ANTIBODY IGG: NEGATIVE
VARICELLA INTERPRETATION: POSITIVE
VARICELLA ZOSTER IGG: 2.33 ISR

## 2018-07-18 PROCEDURE — 25500020 PHARM REV CODE 255: Mod: TXP | Performed by: INTERNAL MEDICINE

## 2018-07-18 PROCEDURE — 74177 CT ABD & PELVIS W/CONTRAST: CPT | Mod: TC,TXP

## 2018-07-18 PROCEDURE — 71046 X-RAY EXAM CHEST 2 VIEWS: CPT | Mod: 26,TXP,, | Performed by: RADIOLOGY

## 2018-07-18 PROCEDURE — 71046 X-RAY EXAM CHEST 2 VIEWS: CPT | Mod: TC,FY,TXP

## 2018-07-18 PROCEDURE — 86901 BLOOD TYPING SEROLOGIC RH(D): CPT | Mod: TXP

## 2018-07-18 PROCEDURE — 74177 CT ABD & PELVIS W/CONTRAST: CPT | Mod: 26,TXP,, | Performed by: RADIOLOGY

## 2018-07-18 PROCEDURE — 71260 CT THORAX DX C+: CPT | Mod: 26,TXP,, | Performed by: RADIOLOGY

## 2018-07-18 PROCEDURE — 86480 TB TEST CELL IMMUN MEASURE: CPT | Mod: TXP

## 2018-07-18 RX ADMIN — IOHEXOL 75 ML: 350 INJECTION, SOLUTION INTRAVENOUS at 10:07

## 2018-07-19 ENCOUNTER — TELEPHONE (OUTPATIENT)
Dept: ENDOSCOPY | Facility: HOSPITAL | Age: 53
End: 2018-07-19

## 2018-07-19 ENCOUNTER — DOCUMENTATION ONLY (OUTPATIENT)
Dept: TRANSPLANT | Facility: CLINIC | Age: 53
End: 2018-07-19

## 2018-07-19 ENCOUNTER — HOSPITAL ENCOUNTER (OUTPATIENT)
Dept: CARDIOLOGY | Facility: CLINIC | Age: 53
Discharge: HOME | End: 2018-07-19
Attending: INTERNAL MEDICINE
Payer: COMMERCIAL

## 2018-07-19 DIAGNOSIS — Z12.11 COLON CANCER SCREENING: Primary | ICD-10-CM

## 2018-07-19 DIAGNOSIS — K74.60 HEPATIC CIRRHOSIS, UNSPECIFIED HEPATIC CIRRHOSIS TYPE, UNSPECIFIED WHETHER ASCITES PRESENT: ICD-10-CM

## 2018-07-19 DIAGNOSIS — C22.0 HCC (HEPATOCELLULAR CARCINOMA): ICD-10-CM

## 2018-07-19 DIAGNOSIS — K70.30 ALCOHOLIC CIRRHOSIS OF LIVER WITHOUT ASCITES: ICD-10-CM

## 2018-07-19 DIAGNOSIS — Z76.82 ORGAN TRANSPLANT CANDIDATE: ICD-10-CM

## 2018-07-19 LAB
A1AT PHENOTYP SERPL-IMP: NORMAL BANDS
A1AT SERPL NEPH-MCNC: 123 MG/DL
DIASTOLIC DYSFUNCTION: NO
ESTIMATED PA SYSTOLIC PRESSURE: 19.71
RETIRED EF AND QEF - SEE NOTES: 60 (ref 55–65)
SCHISTOSOMA IGG SER QL: NEGATIVE

## 2018-07-19 PROCEDURE — 93325 DOPPLER ECHO COLOR FLOW MAPG: CPT | Mod: S$GLB,TXP,, | Performed by: INTERNAL MEDICINE

## 2018-07-19 PROCEDURE — 96372 THER/PROPH/DIAG INJ SC/IM: CPT | Mod: 59,S$GLB,TXP, | Performed by: INTERNAL MEDICINE

## 2018-07-19 PROCEDURE — 93320 DOPPLER ECHO COMPLETE: CPT | Mod: S$GLB,TXP,, | Performed by: INTERNAL MEDICINE

## 2018-07-19 PROCEDURE — 93351 STRESS TTE COMPLETE: CPT | Mod: S$GLB,TXP,, | Performed by: INTERNAL MEDICINE

## 2018-07-19 RX ORDER — POLYETHYLENE GLYCOL 3350, SODIUM SULFATE ANHYDROUS, SODIUM BICARBONATE, SODIUM CHLORIDE, POTASSIUM CHLORIDE 236; 22.74; 6.74; 5.86; 2.97 G/4L; G/4L; G/4L; G/4L; G/4L
4 POWDER, FOR SOLUTION ORAL ONCE
Qty: 4000 ML | Refills: 0 | Status: SHIPPED | OUTPATIENT
Start: 2018-07-19 | End: 2018-07-21

## 2018-07-19 NOTE — NURSING
PRE EDUCATION TEACHING NOTE    Cesario Bailey was seen in clinic today.  Handbook on pre-liver transplant information (see outline below) was given to the patient and time was allowed for questions.  Patient's wife accompanied him.  Informed consent signed and written information given on selection criteria. , Ronald present for teaching.      LIVER TRANSPLANT WORK-UP EDUCATION  I. NATIONAL REGISTRY LISTING  A. Information for listing  B. Regions  C. Per UNOS, can be listed at more than one center  II. SURGERY  A. Length  B. Complications: bleeding, infection  C. Central lines, drains, Draper catheter, incision, endotracheal tube, NG tube  D. Transfusions, cell saver  III. SHORT TERM RECOVERY  A. ICU, PICU, Hospital stay  IV. LONG TERM RECOVERY  A. Labs at home  B. Clinic visits  C. Complications: infection, rejection, readmissions  D. Normal immunity and immunosuppression  E. Incidence of re-admit in 1st year  V. REJECTION  A. Incidence  B. Treatment: Solumedrol, Prograf, Thymoglobulin (actions and side effects)  VI. IMMUNOSUPPRESSIVES  A. Prednisone  B. Imuran/Cellcept  C. Cyclosporin/Prograf  D. Rapamune  E. Need for lifetime compliance  F. Actions and side effects  G. Costs  VII. RECURRENCE OF VIRAL HEPATITIS

## 2018-07-19 NOTE — TELEPHONE ENCOUNTER
Patient: Cesario Bailey       MRN: [id number]      : 1965     Age: 52 [place] [id number]    Provider: Hepatologist Luh Faulkner    Urgency of review: urgent    Patient Transplant Status: In Evaluation    Reason for presentation: Initial staging for transplant    Clinical Summary:   Patient was diagnosed with alcohol-induced cirrhosis about 5 years ago. He stopped drinking since. He developed ascites, that resolved later. He had an episode variceal bleed in 2016 - EVL done. Has been on nadolol since.   Liver decompensations have been controlled but developed HCC on surveillance screening - 2.1 cm with HCC features. He had TACE on 18 in [place]  AFP 3.3  Matt: 3  MELD: 17    Imaging to be reviewed: CT    HCC Treatment History: TACE 18    ABO: A POS    Platelets:   Lab Results   Component Value Date/Time    PLT 49 (L) 2018 11:51 AM     Creatinine:   Lab Results   Component Value Date/Time    CREATININE 1.0 2018 11:51 AM     Bilirubin:   Lab Results   Component Value Date/Time    BILITOT 3.0 (H) 2018 11:51 AM     AFP Last 3 each if available:   Lab Results   Component Value Date/Time    AFP 3.3 2018 11:51 AM       MELD: MELD-Na score: 17 at 2018 11:51 AM  MELD score: 13 at 2018 11:51 AM  Calculated from:  Serum Creatinine: 1 mg/dL at 2018 11:51 AM  Serum Sodium: 133 mmol/L at 2018 11:51 AM  Total Bilirubin: 3 mg/dL at 2018 11:51 AM  INR(ratio): 1.2 at 2018 11:51 AM  Age: 52 years    Plan:     Follow-up Provider:

## 2018-07-19 NOTE — TELEPHONE ENCOUNTER
Spoke with patient and International Interpretor Tyesha Estrada together as .    Pt scheduled for EGD/colonoscopy on 7/23/18  with an arrival time of 8:45am.  Lab work ordered STAT prior to procedure at 8:15am  . Prior to scheduling procedure Pt allergies, BMI, medical history and medications reviewed with Pt.      Prep instructions faxed to Noah Estrada at [phone] for her to give patient. Patient was instructed on prep instructions and where to pickup his prep and it was relayed via Tyesha Estrada .  Patient verbalized understanding and had no further questions.

## 2018-07-20 ENCOUNTER — EVALUATION (OUTPATIENT)
Dept: TRANSPLANT | Facility: CLINIC | Age: 53
End: 2018-07-20
Payer: COMMERCIAL

## 2018-07-20 ENCOUNTER — OFFICE VISIT (OUTPATIENT)
Dept: INFECTIOUS DISEASES | Facility: CLINIC | Age: 53
End: 2018-07-20
Payer: COMMERCIAL

## 2018-07-20 ENCOUNTER — TELEPHONE (OUTPATIENT)
Dept: TRANSPLANT | Facility: CLINIC | Age: 53
End: 2018-07-20

## 2018-07-20 ENCOUNTER — LAB VISIT (OUTPATIENT)
Dept: LAB | Facility: HOSPITAL | Age: 53
End: 2018-07-20
Payer: COMMERCIAL

## 2018-07-20 ENCOUNTER — CLINICAL SUPPORT (OUTPATIENT)
Dept: INFECTIOUS DISEASES | Facility: CLINIC | Age: 53
End: 2018-07-20
Payer: COMMERCIAL

## 2018-07-20 ENCOUNTER — HOSPITAL ENCOUNTER (OUTPATIENT)
Dept: RADIOLOGY | Facility: HOSPITAL | Age: 53
Discharge: HOME | End: 2018-07-20
Attending: INTERNAL MEDICINE
Payer: COMMERCIAL

## 2018-07-20 VITALS
HEART RATE: 67 BPM | HEIGHT: 66 IN | RESPIRATION RATE: 16 BRPM | BODY MASS INDEX: 26.79 KG/M2 | DIASTOLIC BLOOD PRESSURE: 52 MMHG | WEIGHT: 166.69 LBS | OXYGEN SATURATION: 99 % | TEMPERATURE: 99 F | SYSTOLIC BLOOD PRESSURE: 99 MMHG

## 2018-07-20 VITALS
BODY MASS INDEX: 26.74 KG/M2 | SYSTOLIC BLOOD PRESSURE: 98 MMHG | TEMPERATURE: 99 F | HEART RATE: 68 BPM | DIASTOLIC BLOOD PRESSURE: 54 MMHG | WEIGHT: 167.75 LBS

## 2018-07-20 DIAGNOSIS — Z76.82 ORGAN TRANSPLANT CANDIDATE: ICD-10-CM

## 2018-07-20 DIAGNOSIS — C22.0 HCC (HEPATOCELLULAR CARCINOMA): ICD-10-CM

## 2018-07-20 DIAGNOSIS — K70.30 ALCOHOLIC CIRRHOSIS OF LIVER WITHOUT ASCITES: ICD-10-CM

## 2018-07-20 DIAGNOSIS — Z76.82 ORGAN TRANSPLANT CANDIDATE: Primary | ICD-10-CM

## 2018-07-20 DIAGNOSIS — R91.1 LUNG NODULE SEEN ON IMAGING STUDY: ICD-10-CM

## 2018-07-20 DIAGNOSIS — Z01.818 PRE-TRANSPLANT EVALUATION FOR CHRONIC LIVER DISEASE: ICD-10-CM

## 2018-07-20 LAB
MITOGEN IGNF BCKGRD COR BLD-ACNC: 0.06 IU/ML
MITOGEN NIL: >10 IU/ML
TB GOLD PLUS: NEGATIVE
TB1 - NIL: -0.01 IU/ML
TB2 - NIL: -0.02 IU/ML

## 2018-07-20 PROCEDURE — 90715 TDAP VACCINE 7 YRS/> IM: CPT | Mod: S$GLB,TXP,, | Performed by: INTERNAL MEDICINE

## 2018-07-20 PROCEDURE — 99999 PR PBB SHADOW E&M-EST. PATIENT-LVL III: CPT | Mod: PBBFAC,TXP,, | Performed by: PHYSICIAN ASSISTANT

## 2018-07-20 PROCEDURE — 86698 HISTOPLASMA ANTIBODY: CPT | Mod: TXP

## 2018-07-20 PROCEDURE — 93975 VASCULAR STUDY: CPT | Mod: 26,TXP,, | Performed by: RADIOLOGY

## 2018-07-20 PROCEDURE — 86635 COCCIDIOIDES ANTIBODY: CPT | Mod: 91,TXP

## 2018-07-20 PROCEDURE — 99999 PR PBB SHADOW E&M-EST. PATIENT-LVL III: CPT | Mod: PBBFAC,TXP,,

## 2018-07-20 PROCEDURE — 36415 COLL VENOUS BLD VENIPUNCTURE: CPT | Mod: TXP

## 2018-07-20 PROCEDURE — 90471 IMMUNIZATION ADMIN: CPT | Mod: S$GLB,TXP,, | Performed by: INTERNAL MEDICINE

## 2018-07-20 PROCEDURE — 87449 NOS EACH ORGANISM AG IA: CPT | Mod: TXP

## 2018-07-20 PROCEDURE — 86635 COCCIDIOIDES ANTIBODY: CPT | Mod: TXP

## 2018-07-20 PROCEDURE — 86403 PARTICLE AGGLUT ANTBDY SCRN: CPT | Mod: TXP

## 2018-07-20 PROCEDURE — 90670 PCV13 VACCINE IM: CPT | Mod: S$GLB,TXP,, | Performed by: INTERNAL MEDICINE

## 2018-07-20 PROCEDURE — 99205 OFFICE O/P NEW HI 60 MIN: CPT | Mod: S$GLB,TXP,, | Performed by: TRANSPLANT SURGERY

## 2018-07-20 PROCEDURE — 99204 OFFICE O/P NEW MOD 45 MIN: CPT | Mod: S$GLB,TXP,, | Performed by: PHYSICIAN ASSISTANT

## 2018-07-20 PROCEDURE — 90472 IMMUNIZATION ADMIN EACH ADD: CPT | Mod: S$GLB,TXP,, | Performed by: INTERNAL MEDICINE

## 2018-07-20 PROCEDURE — 87385 HISTOPLASMA CAPSUL AG IA: CPT | Mod: TXP

## 2018-07-20 PROCEDURE — 76700 US EXAM ABDOM COMPLETE: CPT | Mod: 26,59,TXP, | Performed by: RADIOLOGY

## 2018-07-20 PROCEDURE — 86612 BLASTOMYCES ANTIBODY: CPT | Mod: TXP

## 2018-07-20 PROCEDURE — 93975 VASCULAR STUDY: CPT | Mod: TC,TXP

## 2018-07-20 NOTE — PROGRESS NOTES
Pre Transplant Infectious Diseases Consult  Liver Transplant Recipient Evaluation    Requesting Physician: Dr. Saskia Young/Dr. Romeo in ID    Reason for Visit:    Chief Complaint   Patient presents with    Liver/Kidney Transplant Pre-evaluation     History of Present Illness  Cesario Bailey is a 52 y.o. year old White male with advanced Liver disease currently being evaluated for Liver transplant. He had paracentesis 5 years ago and denies a history of abd infection/peritonitis.  Patient denies any recent fever, chills, or infective illnesses.      1) Do you have a history of:   YES NO   Diabetes      [x] []     Diabetic Foot Infection/Bone Infection  []        [x]     Surgical Removal of Spleen   []  [x]                  2) Have you had recurrent infections involving:             YES NO  Sinus infections  []         [x]   Sore Throat   []         [x]                 Prostate Infections  []         [x]              Bladder Infections  []         [x]                     Kidney Infections  []         [x]                               Intestinal Infections  []         [x]      Skin Infections   []         [x]       Reproductive Infections          []  [x]   Periodontal Disease  []         [x]        3)Have you ever had: YES     NO UNKNOWN      Chicken Pox   [x]         []  []   Shingles   []         [x]  []   Orolabial Herpes             []  [x]  []   Genital Herpes  []         [x]  []   Cytomegalovirus  []         [x]  []   Ras-Barr Virus  []         [x]  []   Genital Warts   []         [x]  []   Hepatitis A   []         [x]  []   Hepatitis B   []         [x]  []   Hepatitis C   []         [x]  []   Syphilis   []         [x]  []   Gonorrhea   []         [x]  []   Pelvic Inflammatory   Disease   []         [x]  []   Chlamydia Infection  []         [x]  []   Intestinal parasites   or worms   []         [x]  []   Fungal Infections  []         [x]  []   Blood Infections  []         [x]  []       Comment:        4) Have you ever been exposed   YES NO  To someone with tuberculosis?  []   [x]   If yes, what treatment did you receive:     5) What states have you lived in? [place]    6) What countries have you visited for more than 2 weeks?    [place], [place]                     YES NO  7) Did you have any associated infections?  []  [x]       8) Are you planning to travel outside the    [x]  []   [place] after your transplant?    9) Household                   YES NO  Do you have pets living in your house?    [x]         []   If yes, describe: 2 small, parrot - wife cleans     Do you spend time or live on a farm or     []         [x]   have livestock or other farm animals?  If yes, which ones:    Do you have a fish tank?          []  [x]       Do you have a litter box?      []         [x]     Do you fish or hunt?       []         [x]     Do you clean or skin fish or animals?    []         [x]     Do you consume raw or undercooked    []         [x]   meat, fish, or shellfish?      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          12)Do you garden or otherwise  YES NO   work in the soil?    [x]         []   13)Do you hike, camp, or spend     time in wooded areas?   []         [x]        14) The patient's immunization history was reviewed.    Have you ever received:  YES NO UNKNOWN DATES   Routine Childhood vaccines  [x]         []  []      Influenza vaccine   [x]  []  [] 2/2018   Pneumovax    []  [x]  []     Tetanus-diptheria   []         [x]  []    Hepatitis A vaccine series       [x]  []  []    Hepatitis B vaccine series         [x]  []  []    Meningitis vaccine   []         [x]  []    Varicella vaccine   []         [x]  []        Based on the patients immunization history and serologies, immunizations were ordered:         Ordered  Not Ordered  Influenza Vaccine     []    [x]   Hepatitis A series at 0,  6 months   []    [x]   Hepatitis B seriesat  0, 1, and 6 months  []    [x]   Hepatitis B High Dose 0,1, and 6 months  [x]    []   Prevnar      [x]    []   Pneumovax      [x]    []    TDap       [x]    []    Zoster       []    [x]   Menactra      []    [x]            The patient was encouraged to contact us about any problems that may develop after immunization and possible side effects were reviewed.      Previous Transplant: no    Etiology of Liver Disease: etoh    Allergies: Aspirin    There is no immunization history on file for this patient.  Past Medical History:   Diagnosis Date    Alcoholic cirrhosis     Encounter for blood transfusion     HCC (hepatocellular carcinoma)     Portal hypertension      Past Surgical History:   Procedure Laterality Date    HERNIA REPAIR        Social History     Social History    Marital status:      Spouse name: N/A    Number of children: N/A    Years of education: N/A     Occupational History    Not on file.     Social History Main Topics    Smoking status: Never Smoker    Smokeless tobacco: Never Used    Alcohol use Not on file    Drug use: Unknown    Sexual activity: Not on file     Other Topics Concern    Not on file     Social History Narrative    No narrative on file       Review of Systems   Constitution: Negative for chills, decreased appetite, fever, weakness, malaise/fatigue, night sweats, weight gain and weight loss.   HENT: Negative for congestion, ear pain, hearing loss, hoarse voice, sore throat and tinnitus.    Eyes: Positive for redness. Negative for blurred vision and visual disturbance.   Cardiovascular: Negative for chest pain, leg swelling and palpitations.   Respiratory: Negative for cough, hemoptysis, shortness of breath, sputum production and wheezing.    Endocrine: Negative for cold intolerance and heat intolerance.   Hematologic/Lymphatic: Negative for adenopathy. Bruises/bleeds easily.   Skin: Positive for dry skin. Negative for itching, rash and suspicious lesions.    Musculoskeletal: Negative for back pain, joint pain, myalgias and neck pain.   Gastrointestinal: Positive for constipation. Negative for abdominal pain, diarrhea, heartburn, nausea and vomiting.   Genitourinary: Negative for dysuria, flank pain, frequency, hematuria, hesitancy and urgency.   Neurological: Positive for paresthesias. Negative for dizziness, headaches and numbness.   Psychiatric/Behavioral: Negative for depression and memory loss. The patient has insomnia. The patient is not nervous/anxious.    Allergic/Immunologic: Negative for environmental allergies, HIV exposure, hives and persistent infections.     Physical Exam   Constitutional: He is oriented to person, place, and time. He appears well-developed and well-nourished.       HENT:   Head: Normocephalic and atraumatic.   Mouth/Throat: Uvula is midline, oropharynx is clear and moist and mucous membranes are normal. He does not have dentures. No oral lesions. Abnormal dentition (missing teeth). Dental caries (filled) present. No dental abscesses or lacerations.   Eyes: Conjunctivae and lids are normal. Pupils are equal, round, and reactive to light. No scleral icterus.   Neck: Neck supple.   Cardiovascular: Normal rate and regular rhythm.  Exam reveals no gallop and no friction rub.    No murmur heard.  Pulmonary/Chest: Effort normal and breath sounds normal. No respiratory distress. He has no decreased breath sounds. He has no wheezes. He has no rhonchi. He has no rales.   Abdominal: Soft. Normal appearance, normal aorta and bowel sounds are normal. He exhibits no distension and no mass. There is no hepatosplenomegaly. There is no tenderness. There is no rebound and no guarding.   Musculoskeletal: He exhibits no edema.   Lymphadenopathy:        Head (right side): No submental, no submandibular, no tonsillar, no preauricular, no posterior auricular and no occipital adenopathy present.        Head (left side): No submental, no submandibular, no  tonsillar, no preauricular, no posterior auricular and no occipital adenopathy present.     He has no cervical adenopathy.     He has no axillary adenopathy.        Right: No inguinal, no supraclavicular and no epitrochlear adenopathy present.        Left: No inguinal, no supraclavicular and no epitrochlear adenopathy present.   Neurological: He is alert and oriented to person, place, and time. No cranial nerve deficit.   Skin: Skin is warm, dry and intact. No lesion and no rash noted. He is not diaphoretic. No erythema. No pallor.   Psychiatric: He has a normal mood and affect. His behavior is normal.     Diagnostics:   RPR   Date Value Ref Range Status   07/16/2018 Non-reactive Non-reactive Final     No results found for: CMVANTIBODIE  No results found for: HIV1X2  No results found for: HTLVIIIANTIB  Hepatitis B Surface Ag   Date Value Ref Range Status   07/16/2018 Negative  Final     Hep B Core Total Ab   Date Value Ref Range Status   07/16/2018 Negative  Final     Hepatitis C Ab   Date Value Ref Range Status   07/16/2018 Negative  Final     No results found for: TOXOIGG  No components found for: TOXOIGGINTER  No results found for: MKZ5FSW  No results found for: VUK0HRJ  Varicella IgG   Date Value Ref Range Status   07/16/2018 2.33 (H) 0.00 - 0.90 ISR Final     Varicella Interpretation   Date Value Ref Range Status   07/16/2018 Positive (A) Negative Final     Comment:     <or=0.90     Negative        No detectable IgG antibody to Varicella zoster  by the TOM test. Such individuals are presumed to be   uninfected with Varicella zoster and to be susceptible to   primary infection.  0.91-1.09    Equivocal  >or=1.10     Positive        Indicates presence of detectable IgG antibody to Varicella   zoster by the TOM test. Indicative of previous or current   infection.       Strongyloides Ab IgG   Date Value Ref Range Status   07/16/2018 Negative Negative Final     Comment:     No detectable levels of IgG antibodies  to Strongyloides.  Repeat testing in 1-2 weeks if clinically indicated.  Test Performed by:  [facility]  [address], [place] [address]       No results found for: EPSTEINBARRV  Hep B S Ab   Date Value Ref Range Status   07/16/2018 Negative  Final     No results found for: QUANTIFERON  No results found for: HEPAIGM  No results found for: PPD  Results for HE VO (MRN [id number]) as of 7/20/2018 13:42   Ref. Range 7/16/2018 11:51 7/18/2018 08:07   Hep B Core Total Ab Unknown Negative    Hep B S Ab Unknown Negative    Hepatitis B Surface Ag Unknown Negative    GGT Latest Ref Range: 8 - 55 U/L 189 (H)    Hepatitis C Ab Unknown Negative    Mitogen - Nil Latest Ref Range: See text IU/mL  >10.000   NIL Latest Ref Range: See text IU/mL  0.060   TB Gold Plus Unknown  Negative   TB1 - Nil Latest Ref Range: See text IU/mL  -0.010   TB2 - Nil Latest Ref Range: See text IU/mL  -0.020   HIV 1/2 Ag/Ab Latest Ref Range: Negative  Negative    RPR Latest Ref Range: Non-reactive  Non-reactive    CMV IgG Interpretation Unknown Non-Reactive    Schistosoma IgG Unknown Negative    Strongyloides Ab IgG Latest Ref Range: Negative  Negative    Varicella IgG Latest Ref Range: 0.00 - 0.90 ISR 2.33 (H)    Varicella Interpretation Latest Ref Range: Negative  Positive (A)       Ref. Range 7/16/2018 11:51   Hepatitis A Antibody IgG Unknown Positive (A)     Imaging:  CT Chest Abdoment Pelvis With Contrast   Status: Final result   MyChart Results Release     MyChart Status: Pending Results Release   Result Notes     Notes recorded by Oni Faulkner MD on 7/18/2018 at 6:28 PM CDT  Hi Dr. Garcia, Can you please comment on the size of a small lung nodule? This patient has hx of HCC and undergoing liver transplant evaluation. We will need to follow up on that based on its size. Thank you.          PACS Images     Show images for CT Chest Abdoment Pelvis With Contrast   Reviewed By List      Oni Faulkner MD on 7/18/2018 19:01   External Result Report     External Result Report   Narrative     EXAMINATION:  CT CHEST ABDOMEN PELVIS WITH CONTRAST (XPD)    CLINICAL HISTORY:  HCC; Liver cell carcinoma    TECHNIQUE:  Low dose axial images, sagittal and coronal reformations were obtained from the thoracic inlet to the pubic symphysis before and after the IV administration of 75 mL of Omnipaque 350 NO oral contrast was administered.  This study was done per triple phase protocol.    COMPARISON:  None    FINDINGS:  The base of the neck structures all appear unremarkable.    The visualized portion of the airways are patent.    The heart is normal in size with minimal coronary artery calcifications.  The aorta is normal in diameter with no extensive atherosclerotic disease.    Both lungs are well aerated with no consolidative changes, pleural effusions, or large masses.  There is a small nodule in the right lower lobe.    There is evidence of liver cirrhosis with splenomegaly, esophageal varices and increased collaterals.    The pancreas, gallbladder and the adrenals are all unremarkable.    The liver is nodular in appearance with 2 hypodense lesions.  Lesion 1: Measures 1.7 x 1.9 cm and located in segment 6, this most likely represent a treated lesion.  Is lesion 2: Subcentimeter hypodensity that is too small to characterize could represent another treated lesion versus simple cyst.  There is a small ill-defined area of hyperenhancement in the liver dome, roughly measuring 1 x 0.7 cm, this is most likely represents vascular shunting.    There are no enhancing lesions concerning for HCC.    Both kidneys are normal in size and contour with no enhancing masses.  The ureters are normal in course and caliber with no visualized stones.  No evidence of hydronephrosis or hydroureter.  The bladder is unremarkable.  The prostate is not enlarged.    The stomach, small and large bowel are unremarkable with no signs of  obstruction or inflammation.  There are no significant lymphadenopathy or free fluid in the pelvis.    The bony structures are unremarkable with no acute fractures.   Impression       There is a small nodule in the right lower lobe.    There is evidence of liver cirrhosis with splenomegaly, esophageal varices and increased collaterals.    The liver is nodular in appearance with 2 hypodense lesions. Lesion 1: Measures 1.7 x 1.9 cm and located in segment 6, this most likely represent a treated lesion. Is lesion 2: Subcentimeter hypodensity that is too small to characterize could represent another treated lesion versus simple cyst. There is a small ill-defined area of hyperenhancement in the liver dome, roughly measuring 1 x 0.7 cm, this is most likely represents vascular shunting.    There are no enhancing lesions concerning for HCC.    Other findings are detailed above.    Electronically signed by resident: Homer Ye MD  Date: 07/18/2018  Time: 11:17    Electronically signed by: Vincent Garcia MD  Date: 07/18/2018  Time: 14:57    Encounter     View Encounter            X-Ray Chest PA And Lateral   Status: Final result   [facility] Results Release     [facility] Status: Pending Results Release   PACS Images     Show images for X-Ray Chest PA And Lateral   Reviewed By Yudy Faulkner MD on 7/18/2018 10:47   External Result Report     External Result Report   Narrative     EXAMINATION:  XR CHEST PA AND LATERAL    CLINICAL HISTORY:  Liver cell carcinoma    TECHNIQUE:  PA and lateral views of the chest were performed.    COMPARISON:  None    FINDINGS:  The lungs are clear, with normal appearance of pulmonary vasculature and no pleural effusion or pneumothorax.    The cardiomediastinal silhouette is normal.    Bones are intact. Hernia repair mesh overlies the anterior lower abdominal wall.   Impression       No acute abnormality.      Electronically signed by: Uriel Edmonds MD  Date: 07/18/2018  Time: 09:33         DOBUTAMINE STRESS TEST W/ COLOR FLOW   Dobutamine Stress Test w/ Color Flow   Order: [id number]   Status:  Final result   Visible to patient:  No (Not Released) Next appt:  Today at 04:20 PM in Transplant (LIVER SURGERY, TRANSPLANT) Dx:  HCC (hepatocellular carcinoma); Organ...   Notes recorded by Oni Faulkner MD on 7/19/2018 at 11:29 AM CDT  Normal stress echo.   Details     Narrative     Date of Procedure: 07/19/2018    PRE-TEST DATA   EKG: Resting electrocardiogram reveals sinus bradycardia at a rate of 52 bpm.     TEST DESCRIPTION   The patient received a graduated infusion of Dobutamine beginning at 10.00 mcg/Kg/min to a peak dose of 60 mcg/Kg/min, achieving a peak heart rate of 123 bpm, which is 74% of the age predicted maximum heart rate. The patient also received a total of 1.75   mg of Atropine.     EKG Conclusions:    1. The EKG portion of this study is negative for ischemia at a peak heart rate of 123 bpm (74% of predicted).   2. Blood pressure remained stable throughout the protocol  (Presenting BP: 116/61 Peak BP: 137/50).   3. No significant arrhythmias were present.   4. There were no symptoms of chest discomfort or significant dyspnea throughout the protocol.     Echocardiographic Description:      Aorta: The aortic root is normal in size, measuring 2.5 cm at sinotubular junction and 3.4 cm at Sinuses of Valsalva. The proximal ascending aorta is normal in size, measuring 3.1 cm across.     Left Atrium: The left atrial volume index is mildly enlarged, measuring 41.26 cc/m2.     Left Ventricle: The left ventricle is normal in size, with an end-diastolic diameter of 5.0 cm, and an end-systolic diameter of 3.3 cm. LV wall thickness is normal, with the septum and the posterior wall each measuring 0.8 cm across. Relative wall   thickness was normal at 0.32, and the LV mass index was 83.1 g/m2 consistent with normal left ventricular mass. There are no regional wall motion abnormalities. Left ventricular  systolic function appears normal. Visually estimated ejection fraction is   60-65%. The LV Doppler derived stroke volume equals 84.0 ccs.     Diastolic indices: E wave velocity 0.7 m/s, E/A ratio 0.9,  msec., E/e' ratio(avg) 5. Diastolic function is normal.     Right Atrium: The right atrium is normal in size, measuring 5.0 cm in length and 3.5 cm in width in the apical view.     Right Ventricle: The right ventricle is normal in size measuring 4.1 cm at the base in the apical right ventricle-focused view. Global right ventricular systolic function appears normal. Tricuspid annular plane systolic excursion (TAPSE) is 2.7 cm.   Tissue Doppler-derived tricuspid annular peak systolic velocity (S prime) is 16.4 cm/s. The estimated PA systolic pressure is greater than 20 mmHg.     Aortic Valve:  The aortic valve is mildly sclerotic. The aortic valve is tri-leaflet in structure.     Mitral Valve:  The mitral valve is mildly sclerotic.     Tricuspid Valve:  Tricuspid valve is normal in structure with normal leaflet mobility.     Pulmonary Valve:  Pulmonary valve is normal in structure with normal leaflet mobility.     Intracavitary: There is no evidence of pericardial effusion, intracavity mass, thrombi, or vegetation.     Peak Imaging:    Images taken at peak infusion showed left ventricular function augmenting with the ejection fraction becoming 75%. Left ventricular end systolic volume decreases.     The left ventricle is globally hyperdynamic and no dobutamine induced wall motion abnormalities were identified.       CONCLUSIONS     1 - Normal left ventricular systolic function (EF 60-65%).     2 - Normal left ventricular diastolic function.     3 - Normal right ventricular systolic function .     4 - The estimated PA systolic pressure is greater than 20 mmHg.     5 - Moderate left atrial enlargement.     6 - No wall motion abnormalities.     7 - Mild left atrial enlargement.     No evidence of stress induced  myocardial ischemia.         This document has been electronically    SIGNED BY: Marie Faust MD On: 07/19/2018 11:11                  Transplant Infectious Diseases - Candidacy    Assessment/Plan:     Transplant Candidacy: Based on available information, there are no identified significant barriers to transplantation from an infectious disease standpoint pending a negative Histoplasma and Blastomyces urine antigen, Histoplasma serum antigen, Coccidioides Serum Ab, Cryptococcus Serum Antigen. Given pulmonary nodule, rec pulmonary consult if not already done.  Refer to ID clinic for any serologies that require further evaluation.   Final determination of transplant candidacy will be made once evaluation is complete and reviewed by the Transplant Selection Committee.    DAVID Dockery  Vaccine Needs:  1. Hep B high dose (40mcg/dose) today, 1 month and 6 months - rx given for FU doses in [place]  2. Prevnar today  3. Pneumovax in 2 months in [place] - rx given  4. Tdap today  5. Shingrix in [place] - rx given        Counseling:I discussed with Cesario the risk for increased susceptibility to infections following transplantation including increased risk for infection right after transplant and if rejection should occur.  The patients has been counseled on the importance of vaccinations including but not limited to a yearly flu vaccine.  Specific guidance has been provided to the patient regarding the patients occupation, hobbies and activities to avoid future infectious complications including but not limited to avoiding undercooked meats and seafood, proper hygiene, and contact with animals.

## 2018-07-20 NOTE — PROGRESS NOTES
Pt received the Tdap and Prevnar 13 vaccination. Pt updated that the Hepatitis B high dose vaccination is currently out of stock. Will notify pt once the vaccination is back in stock. International department  present throughout the appt. Pt tolerated the injections well. Pt left the unit in NAD.

## 2018-07-20 NOTE — TELEPHONE ENCOUNTER
Submitted for review at liver conference 7/24/18    ----- Message from Oni Faulkner MD sent at 7/16/2018  6:06 PM CDT -----  HCC and alcohol cirrhosis, needs to review at IR once CT done.

## 2018-07-20 NOTE — PROGRESS NOTES
Transplant Surgery Liver Transplant Recipient Evaluation    Referring Physician:   Corresponding Physician:     Subjective:     Reason for Visit: evaluate liver transplant candidacy    History of Present Illness: Cesario Bailey is a 52 y.o. year old male who is being evaluated for liver transplantation.  Pt is resident of [place].  Congolese  used for the interview.         Transplant History: N/A    Native Liver Disease (UNOS terminology):  (based on medical records from referral).    Manifestations of liver disease: ascites (diet-controlled), ascites (diuretic-dependent), encephalopathy, fatigue, muscle wasting, portal hypertension and thrombocytopenia  MELD-Na score: 17 at 7/16/2018 11:51 AM  MELD score: 13 at 7/16/2018 11:51 AM  Calculated from:  Serum Creatinine: 1 mg/dL at 7/16/2018 11:51 AM  Serum Sodium: 133 mmol/L at 7/16/2018 11:51 AM  Total Bilirubin: 3 mg/dL at 7/16/2018 11:51 AM  INR(ratio): 1.2 at 7/16/2018 11:51 AM  Age: 52 years    Previously had decompensating symptoms but was well controlled recently.  He was found to have 2cm HCC on screening imaging.  Underwent TACE in [place].     PMH: reviewed  PSH: reviewed    Review of Systems      Objective:     Physical Exam:  Constitutional:   Vitals reviewed: yes   Well-nourished and well-groomed: yes  Eyes:   Sclerae icteric: no   Extraocular movements intact: yes  GI:    Bowel sounds normal: yes   Tenderness: no    If yes, quadrant/location: not applicable   Palpable masses: no    If yes, quadrant/location: not applicable   Hepatosplenomegaly: no   Ascites: no   Hernia: no    If yes, type/location: not applicable   Surgical scars: yes    If yes, type/location: laparoscopic port sites and umbo hernia repair.  No umbilicus since repair.    Resp:   Effort normal: yes   Breath sounds normal: yes    CV:   Regular rate and rhythm: yes   Heart sounds normal: yes   Femoral pulses normal: yes   Extremities edematous: no  Skin:   Rashes or lesions  present: no    If yes, describe:not applicable   Jaundice:: no    Musculoskeletal:   Gait normal: yes   Strength normal: yes  Psych:   Oriented to person, place, and time: yes   Affect and mood normal: yes    Additional comments: not applicable         Transplant Surgery - Candidacy   Assessment/Plan:   I see no surgical contraindication to liver transplantation. Based on available information, Cesario Bailey is a suitable liver transplant candidate. Final determination of transplant candidacy will be made once evaluation is complete and reviewed by the Liver Transplant Selection Committee.    Da Velásquez Jr, MD           Counseling: We provided Cesario Bailey with a group education session today.  We discussed liver transplantation at length with him, including risks, potential complications, and alternatives in the management of his liver disease.  The discussion included complications related to anesthesia, bleeding, infection, primary nonfunction, and vascular thrombosis.  I discussed the typical postoperative course, length of hospitalization, the need for long-term immunosuppression, and the need for long-term routine follow-up.  I discussed living-donor and -donor transplantation and the relative advantages and disadvantages of each.  I also discussed average waiting times for both living donation and  donation.  I discussed national and center-specific survival rates.  I also mentioned the potential benefit of multicenter listing to candidates listed with centers within more than one organ procurement organization.  All questions were answered.    PHS: I discussed the use of organs from donors with PHS increased-risk behavior, including the testing protocols utilized, as well as data from the literature regarding the likelihood of transmission of hepatitis or HIV.  The patient is willing to consider such grafts.  DCD: I discussed the use of organs recovered by donation after cardiac  death (DCD), including slightly decreased graft survival and greater risk of arterial and biliary complications. The potential advantage to the recipient is possibly receiving a transplant sooner by accepting such an organ. The patient is willing to consider such grafts.

## 2018-07-21 LAB — CRYPTOC AG SER QL LA: NEGATIVE

## 2018-07-23 ENCOUNTER — SURGERY (OUTPATIENT)
Age: 53
End: 2018-07-23

## 2018-07-23 ENCOUNTER — ANESTHESIA EVENT (OUTPATIENT)
Dept: ENDOSCOPY | Facility: HOSPITAL | Age: 53
End: 2018-07-23
Payer: COMMERCIAL

## 2018-07-23 ENCOUNTER — LAB VISIT (OUTPATIENT)
Dept: LAB | Facility: HOSPITAL | Age: 53
End: 2018-07-23
Attending: INTERNAL MEDICINE
Payer: COMMERCIAL

## 2018-07-23 ENCOUNTER — HOSPITAL ENCOUNTER (OUTPATIENT)
Facility: HOSPITAL | Age: 53
Discharge: HOME | End: 2018-07-23
Attending: INTERNAL MEDICINE | Admitting: INTERNAL MEDICINE
Payer: COMMERCIAL

## 2018-07-23 ENCOUNTER — ANESTHESIA (OUTPATIENT)
Dept: ENDOSCOPY | Facility: HOSPITAL | Age: 53
End: 2018-07-23
Payer: COMMERCIAL

## 2018-07-23 VITALS
RESPIRATION RATE: 18 BRPM | HEIGHT: 66 IN | BODY MASS INDEX: 23.38 KG/M2 | DIASTOLIC BLOOD PRESSURE: 65 MMHG | TEMPERATURE: 98 F | WEIGHT: 145.5 LBS | OXYGEN SATURATION: 100 % | HEART RATE: 58 BPM | SYSTOLIC BLOOD PRESSURE: 125 MMHG

## 2018-07-23 DIAGNOSIS — K74.60 HEPATIC CIRRHOSIS, UNSPECIFIED HEPATIC CIRRHOSIS TYPE, UNSPECIFIED WHETHER ASCITES PRESENT: ICD-10-CM

## 2018-07-23 DIAGNOSIS — Z76.82 AWAITING ORGAN TRANSPLANT STATUS: Primary | ICD-10-CM

## 2018-07-23 LAB
BASOPHILS # BLD AUTO: 0.04 K/UL
BASOPHILS NFR BLD: 1 %
DIFFERENTIAL METHOD: ABNORMAL
EOSINOPHIL # BLD AUTO: 0.5 K/UL
EOSINOPHIL NFR BLD: 11.8 %
ERYTHROCYTE [DISTWIDTH] IN BLOOD BY AUTOMATED COUNT: 14.9 %
GLUCOSE SERPL-MCNC: 213 MG/DL (ref 70–110)
HCT VFR BLD AUTO: 39.8 %
HGB BLD-MCNC: 14.2 G/DL
IMM GRANULOCYTES # BLD AUTO: 0.01 K/UL
IMM GRANULOCYTES NFR BLD AUTO: 0.2 %
INR PPP: 1.3
LYMPHOCYTES # BLD AUTO: 0.6 K/UL
LYMPHOCYTES NFR BLD: 14.3 %
MCH RBC QN AUTO: 33.7 PG
MCHC RBC AUTO-ENTMCNC: 35.7 G/DL
MCV RBC AUTO: 95 FL
MONOCYTES # BLD AUTO: 0.4 K/UL
MONOCYTES NFR BLD: 10.1 %
NEUTROPHILS # BLD AUTO: 2.6 K/UL
NEUTROPHILS NFR BLD: 62.6 %
NRBC BLD-RTO: 0 /100 WBC
PLATELET # BLD AUTO: 53 K/UL
PMV BLD AUTO: 11.4 FL
POCT GLUCOSE: 213 MG/DL (ref 70–110)
PROTHROMBIN TIME: 12.9 SEC
RBC # BLD AUTO: 4.21 M/UL
WBC # BLD AUTO: 4.07 K/UL

## 2018-07-23 PROCEDURE — 37000009 HC ANESTHESIA EA ADD 15 MINS: Mod: TXP | Performed by: INTERNAL MEDICINE

## 2018-07-23 PROCEDURE — G0121 COLON CA SCRN NOT HI RSK IND: HCPCS | Mod: TXP | Performed by: INTERNAL MEDICINE

## 2018-07-23 PROCEDURE — E9220 PRA ENDO ANESTHESIA: HCPCS | Mod: TXP,,, | Performed by: NURSE ANESTHETIST, CERTIFIED REGISTERED

## 2018-07-23 PROCEDURE — 43235 EGD DIAGNOSTIC BRUSH WASH: CPT | Mod: 51,TXP,, | Performed by: INTERNAL MEDICINE

## 2018-07-23 PROCEDURE — 37000008 HC ANESTHESIA 1ST 15 MINUTES: Mod: TXP | Performed by: INTERNAL MEDICINE

## 2018-07-23 PROCEDURE — 43235 EGD DIAGNOSTIC BRUSH WASH: CPT | Mod: TXP | Performed by: INTERNAL MEDICINE

## 2018-07-23 PROCEDURE — 36415 COLL VENOUS BLD VENIPUNCTURE: CPT | Mod: NTX

## 2018-07-23 PROCEDURE — 85025 COMPLETE CBC W/AUTO DIFF WBC: CPT | Mod: NTX

## 2018-07-23 PROCEDURE — 25000003 PHARM REV CODE 250: Mod: TXP | Performed by: INTERNAL MEDICINE

## 2018-07-23 PROCEDURE — 63600175 PHARM REV CODE 636 W HCPCS: Mod: TXP | Performed by: NURSE ANESTHETIST, CERTIFIED REGISTERED

## 2018-07-23 PROCEDURE — G0121 COLON CA SCRN NOT HI RSK IND: HCPCS | Mod: TXP,,, | Performed by: INTERNAL MEDICINE

## 2018-07-23 PROCEDURE — 85610 PROTHROMBIN TIME: CPT | Mod: NTX

## 2018-07-23 RX ORDER — PROPOFOL 10 MG/ML
VIAL (ML) INTRAVENOUS CONTINUOUS PRN
Status: DISCONTINUED | OUTPATIENT
Start: 2018-07-23 | End: 2018-07-23

## 2018-07-23 RX ORDER — SODIUM CHLORIDE 9 MG/ML
INJECTION, SOLUTION INTRAVENOUS CONTINUOUS
Status: DISCONTINUED | OUTPATIENT
Start: 2018-07-23 | End: 2018-07-23 | Stop reason: HOSPADM

## 2018-07-23 RX ORDER — PROPOFOL 10 MG/ML
VIAL (ML) INTRAVENOUS
Status: DISCONTINUED | OUTPATIENT
Start: 2018-07-23 | End: 2018-07-23

## 2018-07-23 RX ADMIN — PROPOFOL 40 MG: 10 INJECTION, EMULSION INTRAVENOUS at 09:07

## 2018-07-23 RX ADMIN — PROPOFOL 50 MG: 10 INJECTION, EMULSION INTRAVENOUS at 09:07

## 2018-07-23 RX ADMIN — PROPOFOL 125 MCG/KG/MIN: 10 INJECTION, EMULSION INTRAVENOUS at 10:07

## 2018-07-23 RX ADMIN — PROPOFOL 40 MG: 10 INJECTION, EMULSION INTRAVENOUS at 10:07

## 2018-07-23 RX ADMIN — PROPOFOL 50 MG: 10 INJECTION, EMULSION INTRAVENOUS at 10:07

## 2018-07-23 RX ADMIN — SODIUM CHLORIDE: 0.9 INJECTION, SOLUTION INTRAVENOUS at 09:07

## 2018-07-23 NOTE — PROVATION PATIENT INSTRUCTIONS
Discharge Summary/Instructions after an Endoscopic Procedure  Patient Name: Cesario Adams  Patient MRN: [id number]  Patient YOB: 1965 Monday, July 23, 2018  Romain Gongora MD  RESTRICTIONS:  During your procedure today, you received medications for sedation.  These   medications may affect your judgment, balance and coordination.  Therefore,   for 24 hours, you have the following restrictions:   - DO NOT drive a car, operate machinery, make legal/financial decisions,   sign important papers or drink alcohol.    ACTIVITY:  Today: no heavy lifting, straining or running due to procedural   sedation/anesthesia.  The following day: return to full activity including work.  DIET:  Eat and drink normally unless instructed otherwise.     TREATMENT FOR COMMON SIDE EFFECTS:  - Mild abdominal pain, nausea, belching, bloating or excessive gas:  rest,   eat lightly and use a heating pad.  - Sore Throat: treat with throat lozenges and/or gargle with warm salt   water.  - Because air was used during the procedure, expelling large amounts of air   from your rectum or belching is normal.  - If a bowel prep was taken, you may not have a bowel movement for 1-3 days.    This is normal.  SYMPTOMS TO WATCH FOR AND REPORT TO YOUR PHYSICIAN:  1. Abdominal pain or bloating, other than gas cramps.  2. Chest pain.  3. Back pain.  4. Signs of infection such as: chills or fever occurring within 24 hours   after the procedure.  5. Rectal bleeding, which would show as bright red, maroon, or black stools.   (A tablespoon of blood from the rectum is not serious, especially if   hemorrhoids are present.)  6. Vomiting.  7. Weakness or dizziness.  GO DIRECTLY TO THE NEAREST EMERGENCY ROOM IF YOU HAVE ANY OF THE FOLLOWING:      Difficulty breathing              Chills and/or fever over 101 F   Persistent vomiting and/or vomiting blood   Severe abdominal pain   Severe chest pain   Black, tarry stools   Bleeding- more than one  tablespoon   Any other symptom or condition that you feel may need urgent attention  Your doctor recommends these additional instructions:  If any biopsies were taken, your doctors clinic will contact you in 1 to 2   weeks with any results.  - Discharge patient to home.   - Patient has a contact number available for emergencies.  The signs and   symptoms of potential delayed complications were discussed with the   patient.  Return to normal activities tomorrow.  Written discharge   instructions were provided to the patient.   - Resume previous diet.   - Continue present medications.   - Repeat upper endoscopy in 1 year for surveillance.   - Return to liver clinic.   For questions, problems or results please call your physician - Romain Gongora MD at Work:  [phone].  [facility], EMERGENCY ROOM PHONE NUMBER: [phone]  IF A COMPLICATION OR EMERGENCY SITUATION ARISES AND YOU ARE UNABLE TO REACH   YOUR PHYSICIAN - GO DIRECTLY TO THE EMERGENCY ROOM.  Romain Gongora MD  7/23/2018 10:12:15 AM  This report has been verified and signed electronically.  [employer]OVATION

## 2018-07-23 NOTE — PROVATION PATIENT INSTRUCTIONS
Discharge Summary/Instructions after an Endoscopic Procedure  Patient Name: Cesario Adams  Patient MRN: [id number]  Patient YOB: 1965 Monday, July 23, 2018  Romain Gongora MD  RESTRICTIONS:  During your procedure today, you received medications for sedation.  These   medications may affect your judgment, balance and coordination.  Therefore,   for 24 hours, you have the following restrictions:   - DO NOT drive a car, operate machinery, make legal/financial decisions,   sign important papers or drink alcohol.    ACTIVITY:  Today: no heavy lifting, straining or running due to procedural   sedation/anesthesia.  The following day: return to full activity including work.  DIET:  Eat and drink normally unless instructed otherwise.     TREATMENT FOR COMMON SIDE EFFECTS:  - Mild abdominal pain, nausea, belching, bloating or excessive gas:  rest,   eat lightly and use a heating pad.  - Sore Throat: treat with throat lozenges and/or gargle with warm salt   water.  - Because air was used during the procedure, expelling large amounts of air   from your rectum or belching is normal.  - If a bowel prep was taken, you may not have a bowel movement for 1-3 days.    This is normal.  SYMPTOMS TO WATCH FOR AND REPORT TO YOUR PHYSICIAN:  1. Abdominal pain or bloating, other than gas cramps.  2. Chest pain.  3. Back pain.  4. Signs of infection such as: chills or fever occurring within 24 hours   after the procedure.  5. Rectal bleeding, which would show as bright red, maroon, or black stools.   (A tablespoon of blood from the rectum is not serious, especially if   hemorrhoids are present.)  6. Vomiting.  7. Weakness or dizziness.  GO DIRECTLY TO THE NEAREST EMERGENCY ROOM IF YOU HAVE ANY OF THE FOLLOWING:      Difficulty breathing              Chills and/or fever over 101 F   Persistent vomiting and/or vomiting blood   Severe abdominal pain   Severe chest pain   Black, tarry stools   Bleeding- more than one  tablespoon   Any other symptom or condition that you feel may need urgent attention  Your doctor recommends these additional instructions:  If any biopsies were taken, your doctors clinic will contact you in 1 to 2   weeks with any results.  - Discharge patient to home.   - Patient has a contact number available for emergencies.  The signs and   symptoms of potential delayed complications were discussed with the   patient.  Return to normal activities tomorrow.  Written discharge   instructions were provided to the patient.   - Resume previous diet.   - Continue present medications.   - Repeat colonoscopy in 10 years for screening purposes.   - Return to referring physician.   For questions, problems or results please call your physician - Romain Gongora MD at Work:  [phone].  [facility], EMERGENCY ROOM PHONE NUMBER: [phone]  IF A COMPLICATION OR EMERGENCY SITUATION ARISES AND YOU ARE UNABLE TO REACH   YOUR PHYSICIAN - GO DIRECTLY TO THE EMERGENCY ROOM.  Romain Gongora MD  7/23/2018 10:30:55 AM  This report has been verified and signed electronically.  [employer]ATION

## 2018-07-23 NOTE — DISCHARGE INSTRUCTIONS
La colonoscopia     Se usa ovidio cámara acoplada a un tubo flexible con ovidio lente que carmen imágenes de video.     La colonoscopia es un examen que permite mirar el interior del aparato digestivo inferior (el colon y el recto). Algunas veces de puede mirar la última parte del intestino walton llamada íleon. Consuelo examen puede ayudar a detectar si hay cáncer de colon, así candelaria a encontrar el origen de un dolor abdominal, sangrado y cambios en los hábitos intestinales.  Ana la colonoscopia, un procedimiento ambulatorio que suele efectuarse en el hospital, el proveedor de atención médica puede extirpar (sacar) ovidio pequeña muestra de tejido, o biopsia, para enviarla a análisis; también puede extirpar pequeñas masas, candelaria los pólipos. La frecuencia con la que se requiere ovidio colonoscopia depende de muchos factores incluyendo  la edad, las afecciones médicas, los antecedentes familiares, los síntomas y lo que se descubra ana la colonoscopia anterior.  Los preparativos  · Asegúrese de mencionar al proveedor de atención médica todos los medicamentos que usted carmen, así candelaria cualquier problema de nicole que tenga.   · Hable con perla proveedor de atención médica acerca de los riesgos de consuelo examen, que incluyen sangrado y perforación del intestino, riesgos de la anestesia y los riesgos de que se pase por alto ovidio lesión.  · Ya que usted debe tener el recto y el colon vacíos para consuelo examen, asegúrese de seguir la dieta y las instrucciones de preparación de perla intestino al pie de la letra. Si no lo hace, podría ser necesario postergarle el examen.  · Pregunte a perla médico si necesita tener a un amigo o familiar preparado para que lo lleven a perla casa después del examen.  Aan el examen  · Le administrarán un sedante (medicamento relajante) a través de ovidio sonda IV. Quizás usted esté soñoliento o completamente dormido ana el examen.  · El procedimiento lleva candelaria mínimo 30 minutos.  · Ana esta prueba podrían  hacerse biopsias (muestras de tejido), remoción de pólipos y otros tratamientos.  · El médico realiza un examen digital del recto para magui si hay algún problema allí o en el ano. Se lubrica el recto y se introduce el colonoscopio.   · Si usted está despierto, quizás sienta ovidio sensación similar a la que tiene cuando necesita evacuar. También podría sentir presión a medida que le bombean aire dentro del colon. No se preocupe si tiene que eliminar gases ana el procedimiento.     Ovidio colonoscopia permite al médico magui todo el interior del colon.     Después del examen  · Es posible que perla médico le informe de los resultados de inmediato, o en ovidio visita posterior.  · Trate de eliminar todos los gases sushant después del examen, para evitar ovidio hinchazón y cólicos.  · Después del examen podrá volver a perla alimentación normal y reanudar landy actividades.  Los riesgos y posibles complicaciones incluyen:  · Sangrado (hemorragia)  · Punción o rasgadura en el colon  · Riesgos propios de la anestesia  · Pasar por alto ovidio lesión          Date Last Reviewed: 3/30/2014  © 3036-7670 [email or web address]. [address] [place] [id number]. Todos los derechos reservados. Esta información no pretende sustituir la atención médica profesional. Sólo perla médico puede diagnosticar y tratar un problema de nicole.        La endoscopia de la parte superior del aparato gastrointestinal     Ana la endoscopia se introduce un tubo lan y flexible para magui el interior de la parte superior del tracto gastrointestinal.     La endoscopia de la parte superior del aparato gastrointestinal permite que perla proveedor de atención médica pueda mirar directamente esta parte del organismo, la cual está formada por el esófago, el estómago y el duodeno (la primera sección del intestino walton).   Antes del examen  Siga estas instrucciones y otras que le den antes de la endoscopia. Si no sigue las instrucciones del proveedor de atención médica  al pie de la letra, podrían tener que cancelar la prueba o repetirla.  · No coma ni ezekiel nada después de la medianoche anterior a la prueba. Si va a ser por la tarde, ezekiel solo líquidos elijah en la mañana, y no coma ni ezekiel nada 8 horas antes de la prueba. En algunos casos, es posible que pueda terrence landy medicamentos con un sorbo de agua hasta 2 horas antes del procedimiento. Hable con perla proveedor de atención médica acerca de esto.  · Lleve consigo radiografías y otros resultados de pruebas que le hayan hecho antes.  · Osmani va a estar sedado (muy relajado por efecto de los medicamentos), pida que alguien lo lleve a casa después de la prueba.  · Antes de la prueba, dígale a perla proveedor de atención médica qué medicamentos carmen y si tiene algún problema de nicole.  El procedimiento  Arriba es lo que puede esperar:  · Le pedirán que se acueste en ovidio hutson de endoscopia. Por lo general los pacientes se recuestan del lado tata.  · Lo monitorearán y le darán oxígeno.  · Con un atomizador o líquido para hacer gárgaras le adormecerán la garganta. También le darán medicamentos a través de ovidio sonda intravenosa (IV) para que se sienta más relajado y cómodo. Marcia el procedimiento, puede estar dormido o despierto.  · El proveedor de atención médica pondrá el endoscopio por la boca y lo hará bajar por perla esófago. Hannah tubo es más walton que muchos de los trozos de comida que come. No tendrá problemas para respirar. Los medicamentos que le dieron ayudan a evitar que le dé arcadas.  · Se introduce aire para expandir el tracto gastrointestinal, lo cual puede hacerle eructar.  · Marcia el procedimiento, el proveedor de atención médica puede terrence biopsias (muestras de tejido), remover anormalidades osmani pólipos, o tratar las anormalidades por medio de ovidio variedad de dispositivos que se colocan a través del endoscopio. Usted no sentirá esto.  · El endoscopio lleva imágenes del tracto gastrointestinal superior a ovidio pantalla  de video. Si usted está despierto, podría magui las imágenes.  · Después del procedimiento, descansará un rato, y luego un adulto deberá llevarlo a casa.  Llame al proveedor de atención médica:  Contacte a perla proveedor de atención médica si tiene:  · Heces negruzcas similares a la magdalena; naif en las heces  · Fiebre  · Dolor persistente en el estómago  · Náuseas y vómito, o vomita naif   Date Last Reviewed: 3/15/2014  © 3493-4588 [employer]. [address] [place] [address], [place] [address]. Todos los derechos reservados. Esta información no pretende sustituir la atención médica profesional. Sólo perla médico puede diagnosticar y tratar un problema de nicole.

## 2018-07-23 NOTE — ANESTHESIA PREPROCEDURE EVALUATION
Past Medical History:   Diagnosis Date    Alcoholic cirrhosis     Encounter for blood transfusion     HCC (hepatocellular carcinoma)     Portal hypertension      Past Surgical History:   Procedure Laterality Date    HERNIA REPAIR      umbilical hernia repair, large mesh placed.      Patient Active Problem List   Diagnosis    HCC (hepatocellular carcinoma)    Alcoholic cirrhosis of liver without ascites    Organ transplant candidate    Portal hypertension    Thrombocytopenia due to hypersplenism    Lung nodule seen on imaging study     DSE  CONCLUSIONS     1 - Normal left ventricular systolic function (EF 60-65%).     2 - Normal left ventricular diastolic function.     3 - Normal right ventricular systolic function .     4 - The estimated PA systolic pressure is greater than 20 mmHg.     5 - Moderate left atrial enlargement.     6 - No wall motion abnormalities.     7 - Mild left atrial enlargement.   No evidence of stress induced myocardial ischemia.   This document has been electronically    SIGNED BY: Marie Faust MD On: 07/19/2018 11:11    Body mass index is 23.48 kg/m².  2D Echo:  No results found for this or any previous visit.    Please See ROS/PMH and Active Problem List above                                                                                                               07/23/2018  Cesario Bailey is a 52 y.o., male.    Anesthesia Evaluation    I have reviewed the Patient Summary Reports.    I have reviewed the Nursing Notes.   I have reviewed the Medications.     Review of Systems  Anesthesia Hx:  No problems with previous Anesthesia    Social:  Alcohol Use    Hematology/Oncology:         -- Anemia: Hematology Comments: plts 53K   Cardiovascular:   Exercise tolerance: good    Renal/:  Renal/ Normal     Hepatic/GI:   Liver Disease,    Musculoskeletal:  Musculoskeletal Normal    Neurological:  Neurology Normal    Dermatological:  Skin Normal        Physical  Exam  General:  Well nourished    Airway/Jaw/Neck:  Airway Findings: Mouth Opening: Normal Tongue: Normal  General Airway Assessment: Adult  Mallampati: I  TM Distance: 4 - 6 cm  Jaw/Neck Findings:  Neck ROM: Normal ROM     Eyes/Ears/Nose:  Eyes/Ears/Nose Findings:    Dental:  Dental Findings: In tact   Chest/Lungs:  Chest/Lungs Findings: Clear to auscultation, Normal Respiratory Rate     Heart/Vascular:  Heart Findings: Rate: Normal  Rhythm: Regular Rhythm        Mental Status:  Mental Status Findings:  Cooperative, Alert and Oriented         Anesthesia Plan  Type of Anesthesia, risks & benefits discussed:  Anesthesia Type:  general  Patient's Preference: gen  Intra-op Monitoring Plan:   Intra-op Monitoring Plan Comments:   Post Op Pain Control Plan:   Post Op Pain Control Plan Comments: Iv>po  Induction:   IV  Beta Blocker:  Patient is on a Beta-Blocker and has received one dose within the past 24 hours (No further documentation required).       Informed Consent: Patient understands risks and agrees with Anesthesia plan.  Questions answered. Anesthesia consent signed with patient.  ASA Score: 3     Day of Surgery Review of History & Physical:    H&P update referred to the surgeon.         Ready For Surgery From Anesthesia Perspective.

## 2018-07-23 NOTE — H&P
Short Stay Endoscopy History and Physical    PCP - Primary Doctor No  Referring Physician - Oni Faulkner MD  [address] [place] [address]    Procedure - EGD and Colonoscopy  ASA - per anesthesia  Mallampati - per anesthesia  History of Anesthesia problems - no  Family history Anesthesia problems -  no   Plan of anesthesia - General    HPI  52 y.o. male    Reason for procedure:   Varices screening  Screening colonoscopy  Pre-transplant workup    ROS:  Constitutional: No fevers, chills, No weight loss  CV: No chest pain  Pulm: No cough, No shortness of breath  GI: see HPI    Medical History:  has a past medical history of Alcoholic cirrhosis; Encounter for blood transfusion; HCC (hepatocellular carcinoma); and Portal hypertension.    Surgical History:  has a past surgical history that includes Hernia repair.    Family History: family history includes Diabetes in his father; Heart disease in his mother; Hyperlipidemia in his mother; Hypertension in his mother; Stroke in his mother., see HPI    Social History:  reports that he has never smoked. He has never used smokeless tobacco.    Review of patient's allergies indicates:   Allergen Reactions    Aspirin        Medications:   Prescriptions Prior to Admission   Medication Sig Dispense Refill Last Dose    furosemide (LASIX) 40 MG tablet Take 40 mg by mouth once daily.   7/23/2018 at Unknown time    insulin glargine (LANTUS) 100 unit/mL injection Inject 5 Units into the skin every evening.   7/22/2018 at Unknown time    lactulose (CHRONULAC) 20 gram/30 mL Soln 2 (two) times daily.   7/22/2018 at Unknown time    nadolol (CORGARD) 20 MG tablet Take 20 mg by mouth once daily.   7/23/2018 at Unknown time    rifAXIMin (XIFAXAN) 550 mg Tab Take 550 mg by mouth 2 (two) times daily.   7/23/2018 at Unknown time    spironolactone (ALDACTONE) 100 MG tablet Take 100 mg by mouth once daily.   7/23/2018 at Unknown time    insulin regular 100 unit/mL Inj injection  Inject 20 Units into the skin 3 (three) times daily before meals.   Taking       Physical Exam:    Vital Signs:   Vitals:    07/23/18 0908   BP: 130/60   Pulse: 60   Resp: 17   Temp: 98.2 °F (36.8 °C)       General Appearance: Well appearing in no acute distress  Abdomen: Soft, non tender, non distended with normal bowel sounds, no masses      Labs:  Lab Results   Component Value Date    WBC 4.07 07/23/2018    HGB 14.2 07/23/2018    HCT 39.8 (L) 07/23/2018    PLT 53 (L) 07/23/2018    ALT 62 (H) 07/16/2018    AST 86 (H) 07/16/2018     (L) 07/16/2018    K 3.9 07/16/2018    CL 98 07/16/2018    CREATININE 1.0 07/16/2018    BUN 11 07/16/2018    CO2 28 07/16/2018    TSH 1.066 07/16/2018    PSA 0.08 07/16/2018    INR 1.3 (H) 07/23/2018    HGBA1C 7.7 (H) 07/16/2018       I have explained the risks and benefits of this endoscopic procedure to the patient including but not limited to bleeding, inflammation, infection, perforation, and death.      Romain Gongora MD

## 2018-07-23 NOTE — TRANSFER OF CARE
"Anesthesia Transfer of Care Note    Patient: Cesario Bailey    Procedure(s) Performed: Procedure(s) (LRB):  ESOPHAGOGASTRODUODENOSCOPY (EGD) (N/A)  COLONOSCOPY (N/A)    Patient location: PACU    Anesthesia Type: general    Transport from OR: Transported from OR on room air with adequate spontaneous ventilation    Post pain: adequate analgesia    Post assessment: no apparent anesthetic complications and tolerated procedure well    Post vital signs: stable    Level of consciousness: awake and alert    Nausea/Vomiting: no nausea/vomiting    Complications: none    Transfer of care protocol was followed      Last vitals:   Visit Vitals  /60 (BP Location: Left arm, Patient Position: Lying)   Pulse 60   Temp 36.8 °C (98.2 °F) (Temporal)   Resp 17   Ht 5' 6" (1.676 m)   Wt 66 kg (145 lb 8.1 oz)   SpO2 100%   BMI 23.48 kg/m²     "

## 2018-07-23 NOTE — ANESTHESIA POSTPROCEDURE EVALUATION
"Anesthesia Post Evaluation    Patient: Cesario Bailey    Procedure(s) Performed: Procedure(s) (LRB):  ESOPHAGOGASTRODUODENOSCOPY (EGD) (N/A)  COLONOSCOPY (N/A)    Final Anesthesia Type: general  Patient location during evaluation: PACU  Patient participation: Yes- Able to Participate  Level of consciousness: awake and alert  Post-procedure vital signs: reviewed and stable  Pain management: adequate  Airway patency: patent  PONV status at discharge: No PONV  Anesthetic complications: yes  Perioperative Events: other periop events (see comments)  Annabelle-operative Events Comments: IV infiltration rt forearm with propofol ? 5-8 ml  Cardiovascular status: blood pressure returned to baseline  Respiratory status: unassisted  Hydration status: euvolemic  Follow-up not needed.  Comments: Rt forearm minimally different at DC from  left now. At time of fluids/propofol infiltration small sausage like area noted about 15 cm above IV site, not at IV site. No pain or visible discoloration etc. Minimal tenderness if any. Spoke with pt through interpretor. He will contact us if pain, swelling etc occurs. Patient expresses no concern. In good spirits.        Visit Vitals  /65 (BP Location: Left arm, Patient Position: Sitting)   Pulse (!) 58   Temp 36.9 °C (98.4 °F) (Temporal)   Resp 18   Ht 5' 6" (1.676 m)   Wt 66 kg (145 lb 8.1 oz)   SpO2 100%   BMI 23.48 kg/m²       Pain/Madiha Score: Pain Assessment Performed: Yes (7/23/2018 10:55 AM)  Presence of Pain: denies (7/23/2018 10:55 AM)  Madiha Score: 10 (7/23/2018 10:45 AM)      "

## 2018-07-24 ENCOUNTER — CONFERENCE (OUTPATIENT)
Dept: TRANSPLANT | Facility: CLINIC | Age: 53
End: 2018-07-24

## 2018-07-24 LAB
HISTOPLASMA AG VALUE: 0 NG/ML
HISTOPLASMA ANTIGEN URINE: NEGATIVE

## 2018-07-25 ENCOUNTER — TELEPHONE (OUTPATIENT)
Dept: TRANSPLANT | Facility: CLINIC | Age: 53
End: 2018-07-25

## 2018-07-25 ENCOUNTER — DOCUMENTATION ONLY (OUTPATIENT)
Dept: PHARMACY | Facility: HOSPITAL | Age: 53
End: 2018-07-25

## 2018-07-25 ENCOUNTER — COMMITTEE REVIEW (OUTPATIENT)
Dept: TRANSPLANT | Facility: CLINIC | Age: 53
End: 2018-07-25

## 2018-07-25 LAB
ASPERGILLUS AB SER QL ID: NORMAL
B DERMAT AB SER QL ID: NORMAL
BLASTOMYCES AG INTERP: NEGATIVE
BLASTOMYCES AG RESULT: NORMAL NG/ML
BLASTOMYCES AG SPECIMEN: NORMAL
C IMMITIS AB SER QL ID: NORMAL
H CAPSUL AB SER QL ID: NEGATIVE
H CAPSUL AB TITR SER ID: NORMAL {TITER}
H CAPSUL MYC AB SER QL CF: NEGATIVE
H CAPSUL YST AB SER QL CF: NEGATIVE

## 2018-07-25 NOTE — TELEPHONE ENCOUNTER
GENI received follow up from Tyesha Baker, International Department reporting the pts back up caregiver will be Jessika Godinez phone number [phone].    Tyesha offered to contact the back up caregiver to interpret as she speak Togolese.  Caregiver states understanding all aspects of caregiver role/commitment. Caregiver states is able/willing/committed to being caregiver to the fullest extent necessary.  provided in-depth information to patient and caregiver regarding pre- and post-transplant caregiver role.  Caregiver verbalizes understanding of the education provided today.  Caregiver verbalizes understanding of caregiver responsibilities.   remains available.     Pt is an acceptable candidate for Liver transplant. SW remains available.

## 2018-07-25 NOTE — PROGRESS NOTES
PHARM.D. PRE-TRANSPLANT NOTE:    This patient's medication therapy was evaluated as part of his pre-transplant evaluation.    The following pharmacologic concerns were noted: none    This patient's medication profile was reviewed for contraindications for DAA Hepatitis C therapy:    [x]  No current inducers of CYP 3A4 or PGP  [x]  No amiodarone on this patient's EMR profile in the last 24 months  [x]  No past or current atrial fibrillation on this patient's EMR profile       Current Outpatient Prescriptions   Medication Sig Dispense Refill    furosemide (LASIX) 40 MG tablet Take 40 mg by mouth once daily.      insulin glargine (LANTUS) 100 unit/mL injection Inject 5 Units into the skin every evening.      insulin regular 100 unit/mL Inj injection Inject 20 Units into the skin 3 (three) times daily before meals.      lactulose (CHRONULAC) 20 gram/30 mL Soln 2 (two) times daily.      nadolol (CORGARD) 20 MG tablet Take 20 mg by mouth once daily.      rifAXIMin (XIFAXAN) 550 mg Tab Take 550 mg by mouth 2 (two) times daily.      spironolactone (ALDACTONE) 100 MG tablet Take 100 mg by mouth once daily.       No current facility-administered medications for this visit.          Currently he is responsible for preparing / administering this patient's medications on a daily basis.  I am available for consultation and can be contacted, as needed by the other members of the Liver Transplant team.

## 2018-07-25 NOTE — COMMITTEE REVIEW
Cesario Bailey's case presented to selection committee.  Patient has been accepted for liver transplant due to Primary Liver Malignancy: Hepatoma (HCC) and Cirrhosis and complications of end stage liver disease including hyperbilirubinemia, hypoalbuminemia, ascites, severe malnutrition, jaundice, hyponatremia, splenomegaly, thrombocytopenia and muscle wasting  with a MELD score of 17.  Patient has no absolute contraindications for liver transplant.  Patient will be listed pending financial approval, PET Stress and confirmation of backup caregiver.     Patient will not accept HBcAb positive livers.  Patient will not accept HCVAB positive livers.  Patient will accept DCD livers.  Patient will not accept HCV MARIA R positive livers  Patient will not accept HBV MARIA R positive livers    I was present and agree with the committee's conclusions.

## 2018-07-26 LAB — PHOSPHATIDYLETHANOL (PETH): NEGATIVE NG/ML

## 2018-07-27 LAB
C IMMITIS AB TITR SER CF: NEGATIVE {TITER}
C IMMITIS IGG SER QL: NEGATIVE
C IMMITIS IGM SER QL: NEGATIVE

## 2018-07-30 ENCOUNTER — TELEPHONE (OUTPATIENT)
Dept: ENDOSCOPY | Facility: HOSPITAL | Age: 53
End: 2018-07-30

## 2018-08-02 ENCOUNTER — HOSPITAL ENCOUNTER (OUTPATIENT)
Dept: RADIOLOGY | Facility: HOSPITAL | Age: 53
Discharge: HOME | End: 2018-08-02
Attending: INTERNAL MEDICINE
Payer: COMMERCIAL

## 2018-08-02 ENCOUNTER — HOSPITAL ENCOUNTER (OUTPATIENT)
Dept: CARDIOLOGY | Facility: CLINIC | Age: 53
Discharge: HOME | End: 2018-08-02
Attending: INTERNAL MEDICINE
Payer: COMMERCIAL

## 2018-08-02 DIAGNOSIS — Z76.82 AWAITING ORGAN TRANSPLANT STATUS: ICD-10-CM

## 2018-08-02 LAB — DIASTOLIC DYSFUNCTION: NO

## 2018-08-02 PROCEDURE — 93015 CV STRESS TEST SUPVJ I&R: CPT | Mod: S$GLB,TXP,, | Performed by: INTERNAL MEDICINE

## 2018-08-02 PROCEDURE — 78452 HT MUSCLE IMAGE SPECT MULT: CPT | Mod: TC,TXP

## 2018-08-02 PROCEDURE — 78452 HT MUSCLE IMAGE SPECT MULT: CPT | Mod: 26,TXP,, | Performed by: RADIOLOGY

## 2018-08-14 ENCOUNTER — TELEPHONE (OUTPATIENT)
Dept: TRANSPLANT | Facility: CLINIC | Age: 53
End: 2018-08-14

## 2018-11-26 ENCOUNTER — TELEPHONE (OUTPATIENT)
Dept: TRANSPLANT | Facility: CLINIC | Age: 53
End: 2018-11-26

## 2018-11-26 NOTE — TELEPHONE ENCOUNTER
..  Patient: Cesario Bailey       MRN: [id number]      : 1965     Age: 53 y.o.  [place] [id number]    Provider: Hepatologist Luh Faulkner    Urgency of review: urgent    Patient Transplant Status: Listed    Reason for presentation: Reassessment    Clinical Summary: 52 y/o male listed with HCC s/p Tace 2018.      Imaging to be reviewed: Surveillance CT scan done in [place] in 2018    HCC Treatment History: TACE  2018    ABO: A POS    Platelets:   Lab Results   Component Value Date/Time    PLT 53 (L) 2018 08:09 AM     Creatinine:   Lab Results   Component Value Date/Time    CREATININE 1.0 2018 11:51 AM     Bilirubin:   Lab Results   Component Value Date/Time    BILITOT 3.0 (H) 2018 11:51 AM     AFP Last 3 each if available:   Lab Results   Component Value Date/Time    AFP 3.3 2018 11:51 AM       MELD: MELD-Na score: 17 at 2018 11:51 AM  MELD score: 13 at 2018 11:51 AM  Calculated from:  Serum Creatinine: 1 mg/dL at 2018 11:51 AM  Serum Sodium: 133 mmol/L at 2018 11:51 AM  Total Bilirubin: 3 mg/dL at 2018 11:51 AM  INR(ratio): 1.2 at 2018 11:51 AM  Age: 52 years    Plan: Previoulsy treated lesion demonstrates no residual or recurrence (segment 6). There is a 1.6 cm enhancing lesion in the dome (previously 1.3) which demonstrates faint washout. This is concerning for HCC. Rec MRI now to further evaluate  ---treated segment 6 lesion looks good.  Dome area of enhancement demonstrating interval growth and washout on delayed; concerning but does not  meet criteria.  MRI and AFP now.  Plant to return to [place] after [place] to await transplant.      ALEXIS Arnold RN notified     Follow-up Provider: Oni Faulkner MD

## 2018-11-26 NOTE — TELEPHONE ENCOUNTER
..  Patient: Cesario Bailey       MRN: [id number]      : 1965     Age: 53 y.o.  [place] [id number]    Provider: Hepatologist Luh Faulkner    Urgency of review: urgent    Patient Transplant Status: Listed    Reason for presentation: Reassessment    Clinical Summary: 54 y/o male listed with HCC s/p Tace 2018.      Imaging to be reviewed: Surveillance CT scan done in [place] in 2018    HCC Treatment History: TACE  2018    ABO: A POS    Platelets:   Lab Results   Component Value Date/Time    PLT 53 (L) 2018 08:09 AM     Creatinine:   Lab Results   Component Value Date/Time    CREATININE 1.0 2018 11:51 AM     Bilirubin:   Lab Results   Component Value Date/Time    BILITOT 3.0 (H) 2018 11:51 AM     AFP Last 3 each if available:   Lab Results   Component Value Date/Time    AFP 3.3 2018 11:51 AM       MELD: MELD-Na score: 17 at 2018 11:51 AM  MELD score: 13 at 2018 11:51 AM  Calculated from:  Serum Creatinine: 1 mg/dL at 2018 11:51 AM  Serum Sodium: 133 mmol/L at 2018 11:51 AM  Total Bilirubin: 3 mg/dL at 2018 11:51 AM  INR(ratio): 1.2 at 2018 11:51 AM  Age: 52 years    Plan:     Follow-up Provider:

## 2018-11-27 ENCOUNTER — CONFERENCE (OUTPATIENT)
Dept: TRANSPLANT | Facility: CLINIC | Age: 53
End: 2018-11-27

## 2018-12-20 ENCOUNTER — DOCUMENTATION ONLY (OUTPATIENT)
Dept: TRANSPLANT | Facility: CLINIC | Age: 53
End: 2018-12-20

## 2018-12-20 DIAGNOSIS — Z76.82 PRE-LIVER TRANSPLANT, LISTED: Primary | ICD-10-CM

## 2018-12-20 DIAGNOSIS — C22.0 HCC (HEPATOCELLULAR CARCINOMA): ICD-10-CM

## 2018-12-20 DIAGNOSIS — Z87.898 HISTORY OF ALCOHOL USE: ICD-10-CM

## 2018-12-20 NOTE — NURSING
Received notification from International Department ([place] [place]) that patient will be arriving here on Wed, Jan 8th.  Appointment card completed (labs, MRI, Dr Faulkner) - given to .

## 2019-01-09 ENCOUNTER — HOSPITAL ENCOUNTER (OUTPATIENT)
Dept: RADIOLOGY | Facility: HOSPITAL | Age: 54
Discharge: HOME | End: 2019-01-09
Attending: INTERNAL MEDICINE
Payer: COMMERCIAL

## 2019-01-09 ENCOUNTER — OFFICE VISIT (OUTPATIENT)
Dept: TRANSPLANT | Facility: CLINIC | Age: 54
End: 2019-01-09
Payer: COMMERCIAL

## 2019-01-09 ENCOUNTER — SOCIAL WORK (OUTPATIENT)
Dept: TRANSPLANT | Facility: CLINIC | Age: 54
End: 2019-01-09
Payer: COMMERCIAL

## 2019-01-09 VITALS
RESPIRATION RATE: 18 BRPM | DIASTOLIC BLOOD PRESSURE: 73 MMHG | HEIGHT: 68 IN | OXYGEN SATURATION: 100 % | WEIGHT: 177.94 LBS | HEART RATE: 74 BPM | BODY MASS INDEX: 26.97 KG/M2 | TEMPERATURE: 98 F | SYSTOLIC BLOOD PRESSURE: 125 MMHG

## 2019-01-09 DIAGNOSIS — K76.6 PORTAL HYPERTENSION: ICD-10-CM

## 2019-01-09 DIAGNOSIS — D73.1 THROMBOCYTOPENIA DUE TO HYPERSPLENISM: ICD-10-CM

## 2019-01-09 DIAGNOSIS — C22.0 HCC (HEPATOCELLULAR CARCINOMA): ICD-10-CM

## 2019-01-09 DIAGNOSIS — Z76.82 PRE-LIVER TRANSPLANT, LISTED: ICD-10-CM

## 2019-01-09 DIAGNOSIS — C22.0 HCC (HEPATOCELLULAR CARCINOMA): Primary | ICD-10-CM

## 2019-01-09 DIAGNOSIS — D69.59 THROMBOCYTOPENIA DUE TO HYPERSPLENISM: ICD-10-CM

## 2019-01-09 DIAGNOSIS — K70.30 ALCOHOLIC CIRRHOSIS OF LIVER WITHOUT ASCITES: ICD-10-CM

## 2019-01-09 DIAGNOSIS — Z76.82 ORGAN TRANSPLANT CANDIDATE: ICD-10-CM

## 2019-01-09 DIAGNOSIS — K76.82 HEPATIC ENCEPHALOPATHY: ICD-10-CM

## 2019-01-09 DIAGNOSIS — Z87.898 HISTORY OF ALCOHOL USE: ICD-10-CM

## 2019-01-09 PROCEDURE — 74183 MRI ABD W/O CNTR FLWD CNTR: CPT | Mod: 26,TXP,, | Performed by: RADIOLOGY

## 2019-01-09 PROCEDURE — 74183 MRI ABDOMEN-PELVIS W W/O CONTRAST (XPD): ICD-10-PCS | Mod: 26,TXP,, | Performed by: RADIOLOGY

## 2019-01-09 PROCEDURE — 25500020 PHARM REV CODE 255: Mod: TXP | Performed by: INTERNAL MEDICINE

## 2019-01-09 PROCEDURE — 99215 PR OFFICE/OUTPT VISIT, EST, LEVL V, 40-54 MIN: ICD-10-PCS | Mod: S$GLB,TXP,, | Performed by: INTERNAL MEDICINE

## 2019-01-09 PROCEDURE — 99215 OFFICE O/P EST HI 40 MIN: CPT | Mod: S$GLB,TXP,, | Performed by: INTERNAL MEDICINE

## 2019-01-09 PROCEDURE — A9585 GADOBUTROL INJECTION: HCPCS | Mod: TXP | Performed by: INTERNAL MEDICINE

## 2019-01-09 PROCEDURE — 72197 MRI ABDOMEN-PELVIS W W/O CONTRAST (XPD): ICD-10-PCS | Mod: 26,TXP,, | Performed by: RADIOLOGY

## 2019-01-09 PROCEDURE — 99999 PR PBB SHADOW E&M-EST. PATIENT-LVL IV: CPT | Mod: PBBFAC,TXP,, | Performed by: INTERNAL MEDICINE

## 2019-01-09 PROCEDURE — 99999 PR PBB SHADOW E&M-EST. PATIENT-LVL IV: ICD-10-PCS | Mod: PBBFAC,TXP,, | Performed by: INTERNAL MEDICINE

## 2019-01-09 PROCEDURE — 72197 MRI PELVIS W/O & W/DYE: CPT | Mod: 26,TXP,, | Performed by: RADIOLOGY

## 2019-01-09 PROCEDURE — 72197 MRI PELVIS W/O & W/DYE: CPT | Mod: TC,TXP

## 2019-01-09 RX ORDER — LACTULOSE 10 G/15ML
20 SOLUTION ORAL 2 TIMES DAILY
Qty: 1000 ML | Refills: 10 | Status: SHIPPED | OUTPATIENT
Start: 2019-01-09 | End: 2019-01-09 | Stop reason: SDUPTHER

## 2019-01-09 RX ORDER — LACTULOSE 10 G/15ML
20 SOLUTION ORAL 2 TIMES DAILY
Qty: 1000 ML | Refills: 10 | Status: ON HOLD | OUTPATIENT
Start: 2019-01-09 | End: 2019-04-29 | Stop reason: HOSPADM

## 2019-01-09 RX ORDER — GADOBUTROL 604.72 MG/ML
10 INJECTION INTRAVENOUS
Status: COMPLETED | OUTPATIENT
Start: 2019-01-09 | End: 2019-01-09

## 2019-01-09 RX ADMIN — GADOBUTROL 10 ML: 604.72 INJECTION INTRAVENOUS at 08:01

## 2019-01-09 NOTE — PROGRESS NOTES
Transplant Hepatology (Transplant Evaluation) Consult Note    Referring provider:   Chief complaint:   No chief complaint on file.      HPI:  Cesario Bailey is a 53 y.o. male that presents to Transplant Hepatology Clinic for consultation of had no chief complaint listed for this encounter..  He  is accompanied by his wife.    1/9/19: fatigue, mild HE on lactulose. MELD 12 stable. MRI - recurrent HCC Noted, will review at Liver Imaging Conference, will need retreatment - locoregional therapy, HCC exception due in March.     Background  Patient was diagnosed with alcohol-induced cirrhosis about 5 years ago. He stopped drinking since. He developed ascites, that resolved later. He had an episode variceal bleed in 2016 - EVL done. Has been on nadolol since.   Liver decompensations have been controlled but developed HCC on surveillance screening - 2.1 cm with HCC features. He had TACE on 6/11/18 in [place].    PMH: DM2, denies HTN/CAD/heart problem  PSH: umbilical hernia repair x 2.     Alcohol: stopped alcohol use completely 5 years ago, used to drink a lot for many years. Rehab: no. Relapse: no.  Tobacco: no, never  THC: no  IVDU: no  Cocaine: no    Work: Police in [place]  Family: , 1 daughter: 23 y/o    MELD-Na score: 12 at 1/9/2019  8:41 AM  MELD score: 12 at 1/9/2019  8:41 AM  Calculated from:  Serum Creatinine: 1 mg/dL at 1/9/2019  8:41 AM  Serum Sodium: 137 mmol/L at 1/9/2019  8:41 AM  Total Bilirubin: 2.9 mg/dL at 1/9/2019  8:41 AM  INR(ratio): 1.2 at 1/9/2019  8:41 AM  Age: 53 years    Liver disease:                   Alcohol/HCC    Blood type:                       A+    HCC  2.1 cm  TACE in [place] - 6/11/18      MELD-Na:                         11  Child-Tidwell Class:             B    Transplant status:             listed    Cirrhosis is decompensated with:    Ascites:                                                      Yes, controlled on diuretics  Spontaneous bacterial peritonitis:              no  Hepatic  Encephalopathy:                           Yes, grade 1  Portal bleeding:                                          yes  Hepatocellular carcinoma:                          yes    Hepatorenal syndrome:                              no  Hyponatremia:                                            yes  Muscle wasting:                                          yes   Portal vein thrombosis:                               no    Screening:  Last EGD:    Last imaging study:         Patient Active Problem List   Diagnosis    HCC (hepatocellular carcinoma)    Alcoholic cirrhosis of liver without ascites    Organ transplant candidate    Portal hypertension    Thrombocytopenia due to hypersplenism    Lung nodule seen on imaging study       Past Medical History:   Diagnosis Date    Alcoholic cirrhosis     Encounter for blood transfusion     HCC (hepatocellular carcinoma)     Portal hypertension        Past Surgical History:   Procedure Laterality Date    COLONOSCOPY N/A 7/23/2018    Performed by Romain Gongora MD at [facility] (4TH FLR)    ESOPHAGOGASTRODUODENOSCOPY (EGD) N/A 7/23/2018    Performed by Romain Gongora MD at [facility] (4TH FLR)    HERNIA REPAIR      umbilical hernia repair, large mesh placed.        Family History   Problem Relation Age of Onset    Heart disease Mother     Hyperlipidemia Mother     Hypertension Mother     Stroke Mother     Diabetes Father        Social History     Socioeconomic History    Marital status:      Spouse name: None    Number of children: None    Years of education: None    Highest education level: None   Social Needs    Financial resource strain: None    Food insecurity - worry: None    Food insecurity - inability: None    Transportation needs - medical: None    Transportation needs - non-medical: None   Occupational History    None   Tobacco Use    Smoking status: Never Smoker    Smokeless tobacco: Never Used   Substance and Sexual Activity    Alcohol  "use: None    Drug use: None    Sexual activity: None   Other Topics Concern    None   Social History Narrative    None       Current Outpatient Medications   Medication Sig Dispense Refill    furosemide (LASIX) 40 MG tablet Take 40 mg by mouth once daily.      insulin glargine (LANTUS) 100 unit/mL injection Inject 5 Units into the skin every evening.      insulin regular 100 unit/mL Inj injection Inject 20 Units into the skin 3 (three) times daily before meals.      lactulose (CHRONULAC) 20 gram/30 mL Soln 2 (two) times daily.      nadolol (CORGARD) 20 MG tablet Take 20 mg by mouth once daily.      rifAXIMin (XIFAXAN) 550 mg Tab Take 550 mg by mouth 2 (two) times daily.      spironolactone (ALDACTONE) 100 MG tablet Take 100 mg by mouth once daily.       No current facility-administered medications for this visit.        Review of patient's allergies indicates:   Allergen Reactions    Aspirin             Vitals:    01/09/19 0914   BP: 125/73   Pulse: 74   Resp: 18   Temp: 98.2 °F (36.8 °C)   TempSrc: Oral   SpO2: 100%   Weight: 80.7 kg (177 lb 14.6 oz)   Height: 5' 7.72" (1.72 m)       Physical Exam   Constitutional: He is oriented to person, place, and time. He appears well-developed. No distress.   Chronically ill-appearing. Malnourished. Temporal wasting.     HENT:   Head: Normocephalic and atraumatic.   Mouth/Throat: Oropharynx is clear and moist.   Eyes: Conjunctivae are normal. No scleral icterus.   Neck: Normal range of motion.   Cardiovascular: Normal rate, regular rhythm, normal heart sounds and intact distal pulses.   Pulmonary/Chest: Effort normal and breath sounds normal. No respiratory distress. He has no wheezes. He has no rales.   Abdominal: Soft. Normal appearance and bowel sounds are normal. He exhibits no shifting dullness, no distension, no pulsatile liver, no fluid wave and no ascites. There is no splenomegaly or hepatomegaly. No hernia.   Musculoskeletal: He exhibits no edema. "   Neurological: He is alert and oriented to person, place, and time. He is not disoriented.   Skin: Skin is warm and dry. No rash noted. He is not diaphoretic.   Psychiatric: He has a normal mood and affect. His behavior is normal. His mood appears not anxious. His affect is not inappropriate. He is not agitated. He is communicative. He is attentive.   Nursing note and vitals reviewed.      LABS: I personally reviewed pertinent laboratory findings.    Lab Results   Component Value Date    ALT 62 (H) 07/16/2018    AST 86 (H) 07/16/2018     (H) 07/16/2018    ALKPHOS 99 07/16/2018    BILITOT 3.0 (H) 07/16/2018       Lab Results   Component Value Date    WBC 4.07 07/23/2018    HGB 14.2 07/23/2018    HCT 39.8 (L) 07/23/2018    MCV 95 07/23/2018    PLT 53 (L) 07/23/2018       Lab Results   Component Value Date     (L) 07/16/2018    K 3.9 07/16/2018    CL 98 07/16/2018    CO2 28 07/16/2018    BUN 11 07/16/2018    CREATININE 1.0 07/16/2018    CALCIUM 9.2 07/16/2018    ANIONGAP 7 (L) 07/16/2018    ESTGFRAFRICA >60.0 07/16/2018    EGFRNONAA >60.0 07/16/2018       Lab Results   Component Value Date    INR 1.3 (H) 07/23/2018    INR 1.2 07/16/2018       No results found for: SMOOTHMUSCAB, MITOAB  Lab Results   Component Value Date    IRON 124 07/16/2018    TIBC 351 07/16/2018    FERRITIN 57 07/16/2018     Lab Results   Component Value Date    HEPBCAB Negative 07/16/2018    HEPCAB Negative 07/16/2018     Lab Results   Component Value Date    TSH 1.066 07/16/2018     No results found for: BRIGETTE    No results found for: ABORH        Lab Results   Component Value Date    HGBA1C 7.7 (H) 07/16/2018     No results found for: CHOL  No results found for: HDL  No results found for: LDLCALC  No results found for: TRIG  No results found for: CHOLHDL        Imaging:   I personally reviewed recent cardiology studies available on the chart and from outside medical records.    I personally reviewed recent imaging studies available on  the chart and from outside medical records.        Assessment:  53 y.o. male presenting with     1. HCC (hepatocellular carcinoma)    2. Hepatic encephalopathy    3. Alcoholic cirrhosis of liver without ascites    4. Portal hypertension    5. Thrombocytopenia due to hypersplenism    6. Organ transplant candidate        MELD-Na score: 12 at 1/9/2019  8:41 AM  MELD score: 12 at 1/9/2019  8:41 AM  Calculated from:  Serum Creatinine: 1 mg/dL at 1/9/2019  8:41 AM  Serum Sodium: 137 mmol/L at 1/9/2019  8:41 AM  Total Bilirubin: 2.9 mg/dL at 1/9/2019  8:41 AM  INR(ratio): 1.2 at 1/9/2019  8:41 AM  Age: 53 years    Functional Status: 70% - Cares for self: unable to carry on normal activity or active work  Physical Capacity: No Limitations    Transplant Candidacy: listed  Return in 3 months    Recommendation(s):  - will review MRI at Liver Imaging Conference  - will need retreatment of HCC recurrence  - cirrhosis counseling  - HCC exception due in March 2019    Patient was advised, in presence of caregiver, not to consume any contents which contain alcohol:  Not to use any mouthwash which contains alcohol  To avoid any alcohol-containing OTC medications use  Not to consume Non-Alcoholic Beer as there is a small amount of alcohol in those beverages  Not to cook with any wine or alcohol.  Advised if patients tested positive for alcohol, they would be denied for liver transplant.  Advised all patients are randomly screen for illegal drugs and alcohol. Failure of compliance may result in denial for liver transplant.    Cirrhosis Counseling  - strict abstinence of alcohol use  - compliance with lactulose and rifaximin if you have developed hepatic encephalopathy (confusion due to high ammonia level)  - avoid non-steroidal anti-inflammatory drugs (NSAIDs) such as ibuprofen, Motrin, naprosyn, Alleve due to the risk of kidney damage  - can take acetaminophen (Tylenol), no more than 2000 mg per day  - low sodium (salt) 2 gram per day  diet  - nutrition: 25-30kcal (calorie per body body weight in kilogram) per day  - no need to restrict protein in diet  - high protein diet: 1.2-1.5 gram/kg (protein per body weight in kilogram) per day to prevent muscle mass loss  - avoid fasting  - Late night snack with 50 gram of complex carbohydrates and protein  - resistance exercises for muscle strength  - avoid raw seafoods due to the risk of fatal Vibrio vulnificus infection  - ultrasound or MRI of the liver every 6 months for liver cancer screening (you are at risk of developing liver cancer due to scar tissue in the liver)  - Upper endoscopy every 1-2 years to screen for varices in the stomach and foodpipe which can burst and cause fatal bleeding      I have sent communication to the referring physician and/or primary care provider.    A total of 60 minutes were spent face-to-face with the patient during this encounter and over half of that time was spent on counseling and coordination of care.  We discussed in depth the nature of the patient's liver disease, the management plan and liver transplant evaluation in details. I also educated the patient about lifestyle modifications which may improve hepatic steatosis, overweight/obesity, insulin resistance and high blood pressure issues. I have provided the patient with an opportunity to ask questions and have all questions answered to his satisfaction.       Oni Faulkner MD  Staff Physician  Hepatology and Liver Transplant  [facility]  [facility]

## 2019-01-09 NOTE — PROGRESS NOTES
Transplant Recipient Adult Psychosocial Assessment Update  -Utilizing  Services through [facility]    Cesario Kwongo  [place] [address]  Telephone Information:   Mobile [phone]   Home  [phone] (home)  Work  There is no work phone number on file.  E-mail  [email or web address]    Sex: male  YOB: 1965  Age: 53 y.o.    Encounter Date: 1/9/2019  U.[place] Citizen: yes  Primary Language: Finnish   Needed: yes; Pt and wife present along with  from [facility]:  Maya    Local Lodging:  [facility], Room 667    Emergency Contact:  Name: Rd Rogers  Relationship: wife  Address: same as patient  Phone Numbers:  [phone] (mobile)    Family/Social Support:   Number of dependents/: 0  Marital history: Pt has been  once to his wife Rd for approximately 27 years.  Other family dynamics: Pt and wife have one daughter Kellie Carr who is 24 years old.  Pt's mother passed away in 2012 at the age of 75 from heart disease.  Pt's father passed away in 2010 from complications associated with diabetes.  Pt has a total of 9 siblings with two of them living in the [facility] (1 sister Donita lives in [place], [place] and 1 brother Moo lives in [place]).  Pt also has one grandson who is 4 years old who's mother is the pt's daughter and lives with his mother within the pt's home.  Patient's mother in law has verbalized to be back up caregiver.     Household Composition:  Name: Cesario Bailey  Age: 52  Relationship: patient  Does person drive? yes    Name: Rd Rogers   Age: n/a  Relationship: wife  Does person drive? yes    Name: Kellie Carr  Age: 24  Relationship: daughter  Does person drive? yes     Name: Toro Marrero  Age: 4  Relationship: grandson  Does person drive? no    Do you and your caregivers have access to reliable transportation?  Yes;  while here in [place], pt and his wife will utilize taxi/uber services.  PRIMARY CAREGIVER: Rd Rogers, pts wife, will be primary caregiver, phone number [phone].      provided in-depth information to patient and caregiver regarding pre- and post-transplant caregiver role.   strongly encourages patient and caregiver to have concrete plan regarding post-transplant care giving, including back-up caregiver(s) to ensure care giving needs are met as needed.    Patient and Caregiver states understanding all aspects of caregiver role/commitment and is able/willing/committed to being caregiver to the fullest extent necessary.    Patient and Caregiver verbalizes understanding of the education provided today and caregiver responsibilities.         remains available. Patient and Caregiver agree to contact  in a timely manner if concerns arise.      Able to take time off work without financial concerns: yes;   Pt's wife stated she will be able to a leave from her job without incident.     Additional Significant Others who will Assist with Transplant:  Name: Jessika Godinez  Age: 70  City: [place]   Relationship: Mother in Law  Drives: yes    Living Will: no  Healthcare Power of : no; REsources provided in Arabic  Advance Directives on file: <<no information> per medical record.  Verbally reviewed LW/HCPA information.   provided patient with copy of LW/HCPA documents and provided education on completion of forms.    Living Donors: No.    Highest Education Level: High School (9-12) or GED; pt completed the 12th grade of high school  Reading Ability: 12th grade; pt explained he could read on a 12th grade and above grade level in Arabic only.   Patient does not speak English or read in English;  He will require Arabic materials  Reports difficulty with: vision (pt wears readers); confusion when amonia levels are high  Learns Best By:   Verbal, reading and demonstration in Georgian     Status: no  VA Benefits: no     Working for Income: yes  If yes, working activity level: Working Full Time- Patient is taking vacation time and sick time from his employer, he has enough time to be off until 2019.  Patient has the option to retired in 2019 as well.  Patient is employed as a  with the [place] [employer].  Pt has been employed with the department for 32 years.  pt stated he has accumulated over 60 days of vacation time along with sick time and 18 free days given by his employer.    Spouse/Significant Other Employment: Rd Rogers is employed as a  with the [place] [employer].  She has time off with pay for at least 2.5 months    Disabled: no    Monthly Income:  Salary/Wages: $3138  Able to afford all costs now and if transplanted, including medications: yes  Patient and Caregiver verbalizes understanding of personal responsibilities related to transplant costs and the importance of having a financial plan to ensure that patients transplant costs are fully covered.      provided fundraising information/education.  Patient and Caregiver verbalizes understanding.   remains available.    Insurance:   Payor/Plan Subscr  Sex Relation Sub. Ins. ID Effective Group Num   1. [employer]* HE VO 1965 Male  [id number]                                    ATTN:MEENU MARES, Cruz WEEKS     Primary Insurance (for UNOS reporting): Private Insurance-[facility]; CM is Meenu Almaraz   Secondary Insurance (for UNOS reporting): None  Patient and Caregiver verbalizes clear understanding that patient may experience difficulty obtaining and/or be denied insurance coverage post-surgery. This includes and is not limited to disability insurance, life insurance, health insurance, burial insurance, long term care  "insurance, and other insurances.    Patient and Caregiver also reports understanding that future health concerns related to or unrelated to transplantation may not be covered by patient's insurance.  Resources and information provided and reviewed.      Patient and Caregiver provides verbal permission to release any necessary information to outside resources for patient care and discharge planning.  Resources and information provided are reviewed.      Dialysis Adherence:  Patient reports denied receiving dialysis.     Infusion Service: patient utilizing? no  Home Health: patient utilizing? no  DME: no  Pulmonary/Cardiac Rehab: no   ADLS:  Pt is fully independent and is currently driving    Adherence:   Pt and wife agree pt maintains a positive adherence with all medical instructions, advice and/or medication regimens.  Adherence education and counseling provided.     Per History Section:  Past Medical History:   Diagnosis Date    Alcoholic cirrhosis     Encounter for blood transfusion     HCC (hepatocellular carcinoma)     Portal hypertension      Social History     Tobacco Use    Smoking status: Never Smoker    Smokeless tobacco: Never Used   Substance Use Topics    Alcohol use: Not on file     Social History     Substance and Sexual Activity   Drug Use Not on file     Social History     Substance and Sexual Activity   Sexual Activity Not on file       Per Today's Psychosocial:  Tobacco: none, patient denies any use. Pt denies ever smoking cigarettes.  Alcohol: none, patient denies any use currently.  Pt's last drink was 5.5 years ago.  Pt began drinking at age 23 which consisted of at least 24 cans of beer and a pint of rum daily.  Pt stated he drank an enormous amount of beer until 5 years ago when he began to feel ill.  "I stopped drinking before my diagnosis as I did not feel well a lot of the times".  Pt denied receiving any treatment to aide him in sobriety.   Illicit Drugs/Non-prescribed Medications: " "none, patient denies any use.    Patient and Caregiver states clear understanding of the potential impact of substance use as it relates to transplant candidacy and is aware of possible random substance screening.  Substance abstinence/cessation counseling, education and resources provided and reviewed.     Arrests/DWI/Treatment/Rehab: yes    Psychiatric History:    Mental Health: Pt denied any mental health issues or concerns  Psychiatrist/Counselor: Pt stated his ammonia levels caused him to act "loopy" on the job once and he was referred to [facility] for counseling to rule out mental health problems.  Pt stated he attended four therapy sessions and was deemed competent once his job verified his condition was deemed medically induced.    Medications:  Pt denied being prescribed any psychotropics/anti-depressants  Suicide/Homicide Issues: Pt denied current/past SI/HI ideations   Safety at home: Pt denied any domestic or safety issues within his home setting.    Knowledge: Patient and Caregiver states having clear understanding and realistic expectations regarding the potential risks and potential benefits of organ transplantation and organ donation, agrees to discuss with health care team members and support system members and to utilize available resources and express questions and/or concerns in order to further facilitate the pt informed decision-making.  Resources and information provided and reviewed.     Patient and Caregiver is aware of [facility] affiliation and/or partnership with agencies in home health care, LTAC, SNF, [facility], and other hospitals and clinics.    Understanding: Patient and Caregiver reports having a clear understanding of the many lifetime commitments involved with being a transplant recipient, including costs, compliance, medications, lab work, procedures, appointments, concrete and financial planning, preparedness, timely and appropriate communication of concerns, abstinence (ETOH, tobacco, " "illicit non-prescribed drugs), adherence to all health care team recommendations, support system and caregiver involvement, appropriate and timely resource utilization and follow-through, mental health counseling as needed/recommended, and patient and caregiver responsibilities.  Social Service Handbook, resources and detailed educational information provided and reviewed.  Educational information provided.    Patient and Caregiver also reports current and expected compliance with health care regime and states having a clear understanding of the importance of compliance.      Patient and Caregiver reports a clear understanding that risks and benefits may be involved with organ transplantation and with organ donation.      Patient and Caregiver also reports clear understanding that psychosocial risk factors may affect patient, and include but are not limited to feelings of depression, generalized anxiety, anxiety regarding dependence on others, post traumatic stress disorder, feelings of guilt and other emotional and/or mental concerns, and/or exacerbation of existing mental health concerns.  Detailed resources provided and discussed.     Patient and Caregiver agrees to access appropriate resources in a timely manner as needed and/or as recommended, and to communicate concerns appropriately.  Patient and Caregiver also reports a clear understanding of treatment options available.      reviewed education, provided additional information, and answered questions.    Feelings or Concerns: Patient denies current concerns about liver disease or liver transplantation.     Coping:  Patient reports coping appropriately, but if having a bad day, he enjoys cooking and working in his garden to help with stress.     Goals: "Hope to God I can spend more time at home with my family/grandson, enjoy life that I missed while working and drinking in the past.      Interview Behavior: Patient and Caregiver presents as alert " and oriented x 4, pleasant, good eye contact, well groomed, recall good, concentration/judgement good, average intelligence, calm, communicative, cooperative and asking and answering questions appropriately. Patient's wife present today, along with  from International Dept         Transplant Social Work - Candidacy  Assessment/Plan:     Psychosocial Suitability: Patient presents as an acceptable candidate for liver transplant at this time.  Based on psychosocial risk factors, patient presents as low risk, due to adequate caregiver plan/support system, adequate insurance and finances so far; patient last ETOH use over 5.5 years ago with patient's commitment to sobriety; patient with no other substance abuse issues and no mental health concerns.  SW recommends continued fundraising.   needed.     Recommendations/Additional Comments:   -Fundraising  - or Language Line  -Local Lodging post transplant at [facility]      Bekah Howard

## 2019-01-09 NOTE — LETTER
January 11, 2019                     [facility]  [address]  [facility] [address]  Phone: [phone]   Patient: Cesario Bailey   MR Number: [id number]   YOB: 1965   Date of Visit: 1/9/2019       Dear      Thank you for referring Cesario Bailey to me for evaluation. Attached you will find relevant portions of my assessment and plan of care.    If you have questions, please do not hesitate to call me. I look forward to following Cesario Bailey along with you.    Sincerely,    Oni Faulkner MD    Enclosure    If you would like to receive this communication electronically, please contact [email or web address] or [phone] to request [employer] Link access.    [employer] is a tool which provides read-only access to select patient information with whom you have a relationship. Its easy to use and provides real time access to review your patients record including encounter summaries, notes, results, and demographic information.    If you feel you have received this communication in error or would no longer like to receive these types of communications, please e-mail [email or web address]

## 2019-01-09 NOTE — PATIENT INSTRUCTIONS
Cirrhosis Counseling  - strict abstinence of alcohol use  - compliance with lactulose and rifaximin if you have developed hepatic encephalopathy (confusion due to high ammonia level)  - avoid non-steroidal anti-inflammatory drugs (NSAIDs) such as ibuprofen, Motrin, naprosyn, Alleve due to the risk of kidney damage  - can take acetaminophen (Tylenol), no more than 2000 mg per day  - low sodium (salt) 2 gram per day diet  - nutrition: 25-30kcal (calorie per body body weight in kilogram) per day  - no need to restrict protein in diet  - high protein diet: 1.2-1.5 gram/kg (protein per body weight in kilogram) per day to prevent muscle mass loss  - avoid fasting  - Late night snack with 50 gram of complex carbohydrates and protein  - resistance exercises for muscle strength  - avoid raw seafoods due to the risk of fatal Vibrio vulnificus infection  - ultrasound or MRI of the liver every 6 months for liver cancer screening (you are at risk of developing liver cancer due to scar tissue in the liver)  - Upper endoscopy every 1-2 years to screen for varices in the stomach and foodpipe which can burst and cause fatal bleeding

## 2019-01-10 ENCOUNTER — TELEPHONE (OUTPATIENT)
Dept: TRANSPLANT | Facility: CLINIC | Age: 54
End: 2019-01-10

## 2019-01-10 NOTE — TELEPHONE ENCOUNTER
Patient: Cesario Bailey       MRN: [id number]      : 1965     Age: 53 [place] [address]    Provider: Hepatologist Luh Faulkner    Urgency of review: urgent    Patient Transplant Status: Listed    Reason for presentation: Reassessment    Clinical Summary:   52 y/o male listed with HCC and HCV cirrhosis s/p TACE in [place] 2018.  HCV treated w/ SVR.  AFP: 3.2  MR - 2.1 cm lesion compatible with hepatocellular carcinoma in the right hepatic lobe segment V (axial series 17 image 36, previously 1.9 cm on CT 2018).  Finding is concerning for recurrence      Imaging to be reviewed: MR    HCC Treatment History: TACE    ABO: A POS    Platelets:   Lab Results   Component Value Date/Time    PLT 43 (L) 2019 08:41 AM     Creatinine:   Lab Results   Component Value Date/Time    CREATININE 1.0 2019 08:41 AM     Bilirubin:   Lab Results   Component Value Date/Time    BILITOT 2.9 (H) 2019 08:41 AM     AFP Last 3 each if available:   Lab Results   Component Value Date/Time    AFP 3.2 2019 08:41 AM    AFP 3.3 2018 11:51 AM       MELD: MELD-Na score: 12 at 2019  8:41 AM  MELD score: 12 at 2019  8:41 AM  Calculated from:  Serum Creatinine: 1 mg/dL at 2019  8:41 AM  Serum Sodium: 137 mmol/L at 2019  8:41 AM  Total Bilirubin: 2.9 mg/dL at 2019  8:41 AM  INR(ratio): 1.2 at 2019  8:41 AM  Age: 53 years    Plan:     Follow-up Provider:

## 2019-01-11 NOTE — TELEPHONE ENCOUNTER
Patient: Cesario Bailey       MRN: [id number]      : 1965     Age: 53 y.[place] [address]    Provider: Hepatologist Luh Faulkner    Urgency of review: urgent    Patient Transplant Status: Listed    Reason for presentation: Reassessment    Clinical Summary:   52 y/o male listed with HCC and HCV cirrhosis s/p TACE in [place] 2018.  HCV treated w/ SVR.  AFP: 3.2  MR - 2.1 cm lesion compatible with hepatocellular carcinoma in the right hepatic lobe segment V (axial series 17 image 36, previously 1.9 cm on CT 2018).  Finding is concerning for recurrence      Imaging to be reviewed: MR    HCC Treatment History: TACE    ABO: A POS    Platelets:   Lab Results   Component Value Date/Time    PLT 43 (L) 2019 08:41 AM     Creatinine:   Lab Results   Component Value Date/Time    CREATININE 1.0 2019 08:41 AM     Bilirubin:   Lab Results   Component Value Date/Time    BILITOT 2.9 (H) 2019 08:41 AM     AFP Last 3 each if available:   Lab Results   Component Value Date/Time    AFP 3.2 2019 08:41 AM    AFP 3.3 2018 11:51 AM       MELD: MELD-Na score: 12 at 2019  8:41 AM  MELD score: 12 at 2019  8:41 AM  Calculated from:  Serum Creatinine: 1 mg/dL at 2019  8:41 AM  Serum Sodium: 137 mmol/L at 2019  8:41 AM  Total Bilirubin: 2.9 mg/dL at 2019  8:41 AM  INR(ratio): 1.2 at 2019  8:41 AM  Age: 53 years    Plan: 2.1 cm enhancing lesion with washout in segment 5; Locoregional would be ablation   Proceed with Y90 segmentectomy as planned.     ALEXIS Arnold RN notified.      Follow-up Provider: Oni Faulkner MD

## 2019-01-13 PROBLEM — K76.82 HEPATIC ENCEPHALOPATHY: Status: ACTIVE | Noted: 2019-01-13

## 2019-01-15 ENCOUNTER — CONFERENCE (OUTPATIENT)
Dept: TRANSPLANT | Facility: CLINIC | Age: 54
End: 2019-01-15

## 2019-01-15 DIAGNOSIS — C22.0 HEPATOCELLULAR CARCINOMA: Primary | ICD-10-CM

## 2019-01-16 ENCOUNTER — TELEPHONE (OUTPATIENT)
Dept: TRANSPLANT | Facility: CLINIC | Age: 54
End: 2019-01-16

## 2019-01-16 DIAGNOSIS — C22.0 HCC (HEPATOCELLULAR CARCINOMA): Primary | ICD-10-CM

## 2019-01-16 NOTE — TELEPHONE ENCOUNTER
Spoke with Ninfa in IR about getting patient scheduled ASAP for treatment of HCC recurrence.  Patient to be scheduled for TACE/Ablation Monday, Jan 21.  International department notified and informed a new letter of necessity in Epic to send to insurance with Transplant Department recommendations for treatment to take place here. Ninfa to call international department with Pre op instructions to give to patient.  Once patient receives treatment he will be able to return to [place] and await his [employer] exception points which will be accrued in March.

## 2019-01-17 DIAGNOSIS — C22.0 HCC (HEPATOCELLULAR CARCINOMA): Primary | ICD-10-CM

## 2019-01-18 ENCOUNTER — TELEPHONE (OUTPATIENT)
Dept: INTERVENTIONAL RADIOLOGY/VASCULAR | Facility: HOSPITAL | Age: 54
End: 2019-01-18

## 2019-01-18 RX ORDER — HEPARIN SODIUM 200 [USP'U]/100ML
500 INJECTION, SOLUTION INTRAVENOUS CONTINUOUS
Status: CANCELLED | OUTPATIENT
Start: 2019-01-18

## 2019-01-30 ENCOUNTER — TELEPHONE (OUTPATIENT)
Dept: TRANSPLANT | Facility: CLINIC | Age: 54
End: 2019-01-30

## 2019-01-31 NOTE — TELEPHONE ENCOUNTER
BACK-UP ORGAN OFFER NOTE    Notified by Earl Blood, , of backup liver offer with donor information.  Donor and recipient information read back and verified.  Spoke with Starla, . Starla attempted to contact patient but found out he is back in [place]. Notified , Earl Blood.

## 2019-03-29 DIAGNOSIS — C22.0 HCC (HEPATOCELLULAR CARCINOMA): Primary | ICD-10-CM

## 2019-03-29 DIAGNOSIS — K70.30 ALCOHOLIC CIRRHOSIS, UNSPECIFIED WHETHER ASCITES PRESENT: ICD-10-CM

## 2019-03-29 DIAGNOSIS — Z76.82 ORGAN TRANSPLANT CANDIDATE: Primary | ICD-10-CM

## 2019-04-01 ENCOUNTER — HOSPITAL ENCOUNTER (OUTPATIENT)
Dept: RADIOLOGY | Facility: HOSPITAL | Age: 54
Discharge: HOME | End: 2019-04-01
Attending: INTERNAL MEDICINE
Payer: COMMERCIAL

## 2019-04-01 ENCOUNTER — OFFICE VISIT (OUTPATIENT)
Dept: TRANSPLANT | Facility: CLINIC | Age: 54
End: 2019-04-01
Payer: COMMERCIAL

## 2019-04-01 VITALS
SYSTOLIC BLOOD PRESSURE: 123 MMHG | WEIGHT: 176.56 LBS | BODY MASS INDEX: 27.71 KG/M2 | RESPIRATION RATE: 18 BRPM | HEART RATE: 68 BPM | TEMPERATURE: 99 F | HEIGHT: 67 IN | DIASTOLIC BLOOD PRESSURE: 59 MMHG | OXYGEN SATURATION: 99 %

## 2019-04-01 DIAGNOSIS — D73.1 THROMBOCYTOPENIA DUE TO HYPERSPLENISM: ICD-10-CM

## 2019-04-01 DIAGNOSIS — K76.6 PORTAL HYPERTENSION: ICD-10-CM

## 2019-04-01 DIAGNOSIS — C22.0 HCC (HEPATOCELLULAR CARCINOMA): Primary | ICD-10-CM

## 2019-04-01 DIAGNOSIS — K70.30 ALCOHOLIC CIRRHOSIS OF LIVER WITHOUT ASCITES: ICD-10-CM

## 2019-04-01 DIAGNOSIS — Z76.82 ORGAN TRANSPLANT CANDIDATE: ICD-10-CM

## 2019-04-01 DIAGNOSIS — K76.82 HEPATIC ENCEPHALOPATHY: ICD-10-CM

## 2019-04-01 DIAGNOSIS — D69.59 THROMBOCYTOPENIA DUE TO HYPERSPLENISM: ICD-10-CM

## 2019-04-01 PROCEDURE — 99999 PR PBB SHADOW E&M-EST. PATIENT-LVL IV: ICD-10-PCS | Mod: PBBFAC,TXP,, | Performed by: INTERNAL MEDICINE

## 2019-04-01 PROCEDURE — 71250 CT CHEST WITHOUT CONTRAST: ICD-10-PCS | Mod: 26,TXP,, | Performed by: RADIOLOGY

## 2019-04-01 PROCEDURE — 71250 CT THORAX DX C-: CPT | Mod: 26,TXP,, | Performed by: RADIOLOGY

## 2019-04-01 PROCEDURE — 99215 PR OFFICE/OUTPT VISIT, EST, LEVL V, 40-54 MIN: ICD-10-PCS | Mod: S$GLB,,, | Performed by: INTERNAL MEDICINE

## 2019-04-01 PROCEDURE — 99215 OFFICE O/P EST HI 40 MIN: CPT | Mod: S$GLB,,, | Performed by: INTERNAL MEDICINE

## 2019-04-01 PROCEDURE — 71250 CT THORAX DX C-: CPT | Mod: TC,TXP

## 2019-04-01 PROCEDURE — 99999 PR PBB SHADOW E&M-EST. PATIENT-LVL IV: CPT | Mod: PBBFAC,TXP,, | Performed by: INTERNAL MEDICINE

## 2019-04-01 NOTE — PATIENT INSTRUCTIONS
- we will review your CT scan from today and CT scan from [place] at next week Imaging Conference  - if the scans are ok, you will be activated on liver transplant list  - you will stay in [place] for liver transplant    Cirrhosis Counseling  - strict abstinence of alcohol use  - compliance with lactulose and rifaximin if you have developed hepatic encephalopathy (confusion due to high ammonia level)  - avoid non-steroidal anti-inflammatory drugs (NSAIDs) such as ibuprofen, Motrin, naprosyn, Alleve due to the risk of kidney damage  - can take acetaminophen (Tylenol), no more than 2000 mg per day  - low sodium (salt) 2 gram per day diet  - nutrition: 25-30kcal (calorie per body body weight in kilogram) per day  - no need to restrict protein in diet  - high protein diet: 1.2-1.5 gram/kg (protein per body weight in kilogram) per day to prevent muscle mass loss  - avoid fasting  - Late night snack with 50 gram of complex carbohydrates and protein  - resistance exercises for muscle strength  - avoid raw seafoods due to the risk of fatal Vibrio vulnificus infection  - Upper endoscopy every 1-2 years to screen for varices in the stomach and foodpipe which can burst and cause fatal bleeding

## 2019-04-01 NOTE — PROGRESS NOTES
Transplant Hepatology Consult Note    Referring provider:     Chief complaint:   Chief Complaint   Patient presents with    Hepatic Cancer       HPI:  Cesario Bailey is a 53 y.o. male that presents to Transplant Hepatology Clinic for consultation of had concerns including Hepatic Cancer..  He  is accompanied by his wife.    Follow up  4/1/19: feeling well, denies decompensations. MELD: 13, MELD exception for HCC is due. Will review [facility] from [facility] at next week IR conference.    2/2019: ablation done in [facility].  1/9/19: fatigue, mild HE on lactulose. MELD 12 stable. MRI - recurrent HCC Noted, will review at Liver Imaging Conference, will need retreatment - locoregional therapy, HCC exception due in March.     Background  Patient was diagnosed with alcohol-induced cirrhosis about 5 years ago. He stopped drinking since. He developed ascites, that resolved later. He had an episode variceal bleed in 2016 - EVL done. Has been on nadolol since.   Liver decompensations have been controlled but developed HCC on surveillance screening - 2.1 cm with HCC features. He had TACE on 6/11/18 in [place].    PMH: DM2, denies HTN/CAD/heart problem  PSH: umbilical hernia repair x 2.     Alcohol: stopped alcohol use completely 5 years ago, used to drink a lot for many years. Rehab: no. Relapse: no.  Tobacco: no, never  THC: no  IVDU: no  Cocaine: no    Work: [employer] in [place]  Family: , 1 daughter: 23 y/o    MELD-Na score: 13 at 4/1/2019 10:36 AM  MELD score: 13 at 4/1/2019 10:36 AM  Calculated from:  Serum Creatinine: 0.9 mg/dL (Rounded to 1 mg/dL) at 4/1/2019 10:36 AM  Serum Sodium: 138 mmol/L (Rounded to 137 mmol/L) at 4/1/2019 10:36 AM  Total Bilirubin: 3.0 mg/dL at 4/1/2019 10:36 AM  INR(ratio): 1.2 at 4/1/2019 10:36 AM  Age: 53 years    Liver disease:                   Alcohol/HCC    Blood type:                       A+    HCC  2.1 cm  TACE in [facility] - 6/11/18      MELD-Na:                         13  Child-Tidwell Class:              B    Transplant status:             listed    Cirrhosis is decompensated with:    Ascites:                                                      Yes, controlled on diuretics  Spontaneous bacterial peritonitis:              no  Hepatic Encephalopathy:                           Yes, grade 1  Portal bleeding:                                          yes  Hepatocellular carcinoma:                          yes    Hepatorenal syndrome:                              no  Hyponatremia:                                            yes  Muscle wasting:                                          yes   Portal vein thrombosis:                               no    Screening:  Last EGD:    Last imaging study:         Patient Active Problem List   Diagnosis    HCC (hepatocellular carcinoma)    Alcoholic cirrhosis of liver without ascites    Organ transplant candidate    Portal hypertension    Thrombocytopenia due to hypersplenism    Lung nodule seen on imaging study    Hepatic encephalopathy       Past Medical History:   Diagnosis Date    Alcoholic cirrhosis     Encounter for blood transfusion     HCC (hepatocellular carcinoma)     Portal hypertension        Past Surgical History:   Procedure Laterality Date    COLONOSCOPY N/A 7/23/2018    Performed by [facility] at [facility] ([facility]TH FLR)    ESOPHAGOGASTRODUODENOSCOPY (EGD) N/A 7/23/2018    Performed by [facility]ain [facility]ora MD at [facility] END[facility] (4TH FLR)    HERNIA REPAIR      umbilical hernia repair, large mesh placed.        Family History   Problem Relation Age of Onset    Heart disease Mother     Hyperlipidemia Mother     Hypertension Mother     Stroke Mother     Diabetes Father        Social History     Socioeconomic History    Marital status:      Spouse name: Not on file    Number of children: Not on file    Years of education: Not on file    Highest education level: Not on file   Occupational History    Not on file   Social Needs    Financial resource  strain: Not on file    Food insecurity:     Worry: Not on file     Inability: Not on file    Transportation needs:     Medical: Not on file     Non-medical: Not on file   Tobacco Use    Smoking status: Never Smoker    Smokeless tobacco: Never Used   Substance and Sexual Activity    Alcohol use: Not on file    Drug use: Not on file    Sexual activity: Not on file   Lifestyle    Physical activity:     Days per week: Not on file     Minutes per session: Not on file    Stress: Not on file   Relationships    Social connections:     Talks on phone: Not on file     Gets together: Not on file     Attends [employer] service: Not on file     Active member of club or organization: Not on file     Attends meetings of clubs or organizations: Not on file     Relationship status: Not on file    Intimate partner violence:     Fear of current or ex partner: Not on file     Emotionally abused: Not on file     Physically abused: Not on file     Forced sexual activity: Not on file   Other Topics Concern    Not on file   Social History Narrative    Not on file       Current Outpatient Medications   Medication Sig Dispense Refill    furosemide (LASIX) 40 MG tablet Take 40 mg by mouth once daily.      insulin glargine (LANTUS) 100 unit/mL injection Inject 5 Units into the skin every evening.      insulin regular 100 unit/mL Inj injection Inject 20 Units into the skin 3 (three) times daily before meals.      lactulose (CHRONULAC) 20 gram/30 mL Soln Take 30 mLs (20 g total) by mouth 2 (two) times daily. 1000 mL 10    nadolol (CORGARD) 20 MG tablet Take 20 mg by mouth once daily.      rifAXIMin (XIFAXAN) 550 mg Tab Take 550 mg by mouth 2 (two) times daily.      spironolactone (ALDACTONE) 100 MG tablet Take 100 mg by mouth once daily.       No current facility-administered medications for this visit.      Facility-Administered Medications Ordered in Other Visits   Medication Dose Route Frequency Provider Last Rate Last Dose  "   DOXOrubicin with LC beads chemoembolization  76 mg Intra-arterial Once Crystal Gomez DNP        And    DOXOrubicin with LC beads chemoembolization  76 mg Intra-arterial Once Crystal Gomez DNP           Review of patient's allergies indicates:   Allergen Reactions    Aspirin             Vitals:    04/01/19 1533   BP: (!) 123/59   Pulse: 68   Resp: 18   Temp: 98.5 °F (36.9 °C)   TempSrc: Oral   SpO2: 99%   Weight: 80.1 kg (176 lb 9.4 oz)   Height: 5' 7.48" (1.714 m)       Physical Exam   Constitutional: He is oriented to person, place, and time. He appears well-developed. No distress.   Chronically ill-appearing. Malnourished. Temporal wasting.     HENT:   Head: Normocephalic and atraumatic.   Mouth/Throat: Oropharynx is clear and moist.   Eyes: Conjunctivae are normal. No scleral icterus.   Neck: Normal range of motion.   Cardiovascular: Normal rate, regular rhythm, normal heart sounds and intact distal pulses.   Pulmonary/Chest: Effort normal and breath sounds normal. No respiratory distress. He has no wheezes. He has no rales.   Abdominal: Soft. Normal appearance and bowel sounds are normal. He exhibits no shifting dullness, no distension, no pulsatile liver, no fluid wave and no ascites. There is no splenomegaly or hepatomegaly. No hernia.   Musculoskeletal: He exhibits no edema.   Neurological: He is alert and oriented to person, place, and time. He is not disoriented.   Skin: Skin is warm and dry. No rash noted. He is not diaphoretic.   Psychiatric: He has a normal mood and affect. His behavior is normal. His mood appears not anxious. His affect is not inappropriate. He is not agitated. He is communicative. He is attentive.   Nursing note and vitals reviewed.      LABS: I personally reviewed pertinent laboratory findings.    Lab Results   Component Value Date    ALT 59 (H) 01/09/2019    AST 81 (H) 01/09/2019     (H) 07/16/2018    ALKPHOS 93 01/09/2019    BILITOT 2.9 (H) 01/09/2019 "       Lab Results   Component Value Date    WBC 3.37 (L) 01/09/2019    HGB 13.2 (L) 01/09/2019    HCT 38.5 (L) 01/09/2019    MCV 98 01/09/2019    PLT 43 (L) 01/09/2019       Lab Results   Component Value Date     01/09/2019    K 4.3 01/09/2019     01/09/2019    CO2 27 01/09/2019    BUN 15 01/09/2019    CREATININE 1.0 01/09/2019    CALCIUM 8.6 (L) 01/09/2019    ANIONGAP 7 (L) 01/09/2019    ESTGFRAFRICA >60.0 01/09/2019    EGFRNONAA >60.0 01/09/2019       Lab Results   Component Value Date    INR 1.2 01/09/2019    INR 1.3 (H) 07/23/2018    INR 1.2 07/16/2018       No results found for: SMOOTHMUSCAB, MITOAB  Lab Results   Component Value Date    IRON 124 07/16/2018    TIBC 351 07/16/2018    FERRITIN 57 07/16/2018     Lab Results   Component Value Date    HEPBCAB Negative 07/16/2018    HEPCAB Negative 07/16/2018     Lab Results   Component Value Date    TSH 1.066 07/16/2018     No results found for: BRIGETTE    No results found for: ABORH        Lab Results   Component Value Date    HGBA1C 7.7 (H) 07/16/2018     No results found for: CHOL  No results found for: HDL  No results found for: LDLCALC  No results found for: TRIG  No results found for: CHOLHDL        Imaging:   I personally reviewed recent cardiology studies available on the chart and from outside medical records.    I personally reviewed recent imaging studies available on the chart and from outside medical records.        Assessment:  53 y.o. male presenting with     1. HCC (hepatocellular carcinoma)    2. Organ transplant candidate    3. Hepatic encephalopathy    4. Portal hypertension    5. Alcoholic cirrhosis of liver without ascites    6. Thrombocytopenia due to hypersplenism        MELD-Na score: 13 at 4/1/2019 10:36 AM  MELD score: 13 at 4/1/2019 10:36 AM  Calculated from:  Serum Creatinine: 0.9 mg/dL (Rounded to 1 mg/dL) at 4/1/2019 10:36 AM  Serum Sodium: 138 mmol/L (Rounded to 137 mmol/L) at 4/1/2019 10:36 AM  Total Bilirubin: 3.0 mg/dL at  4/1/2019 10:36 AM  INR(ratio): 1.2 at 4/1/2019 10:36 AM  Age: 53 years    Functional Status: 70% - Cares for self: unable to carry on normal activity or active work  Physical Capacity: No Limitations    Transplant Candidacy: listed  Return in 3 months    Recommendation(s):  - will review outside CT at Liver Imaging Conference  - cirrhosis counseling  - HCC exception due  - patient will stay in [place] for liver transplant    Patient was advised, in presence of caregiver, not to consume any contents which contain alcohol:  Not to use any mouthwash which contains alcohol  To avoid any alcohol-containing OTC medications use  Not to consume Non-Alcoholic Beer as there is a small amount of alcohol in those beverages  Not to cook with any wine or alcohol.  Advised if patients tested positive for alcohol, they would be denied for liver transplant.  Advised all patients are randomly screen for illegal drugs and alcohol. Failure of compliance may result in denial for liver transplant.    Cirrhosis Counseling  - strict abstinence of alcohol use  - compliance with lactulose and rifaximin if you have developed hepatic encephalopathy (confusion due to high ammonia level)  - avoid non-steroidal anti-inflammatory drugs (NSAIDs) such as ibuprofen, Motrin, naprosyn, Alleve due to the risk of kidney damage  - can take acetaminophen (Tylenol), no more than 2000 mg per day  - low sodium (salt) 2 gram per day diet  - nutrition: 25-30kcal (calorie per body body weight in kilogram) per day  - no need to restrict protein in diet  - high protein diet: 1.2-1.5 gram/kg (protein per body weight in kilogram) per day to prevent muscle mass loss  - avoid fasting  - Late night snack with 50 gram of complex carbohydrates and protein  - resistance exercises for muscle strength  - avoid raw seafoods due to the risk of fatal Vibrio vulnificus infection  - ultrasound or MRI of the liver every 6 months for liver cancer screening (you are at risk of  developing liver cancer due to scar tissue in the liver)  - Upper endoscopy every 1-2 years to screen for varices in the stomach and foodpipe which can burst and cause fatal bleeding      I have sent communication to the referring physician and/or primary care provider.    A total of 40 minutes were spent face-to-face with the patient during this encounter and over half of that time was spent on counseling and coordination of care.  We discussed in depth the nature of the patient's liver disease, the management plan and liver transplant evaluation in details. I also educated the patient about lifestyle modifications which may improve hepatic steatosis, overweight/obesity, insulin resistance and high blood pressure issues. I have provided the patient with an opportunity to ask questions and have all questions answered to his satisfaction.       Oni Faulkner MD  Staff Physician  Hepatology and Liver Transplant  [facility]  [facility]

## 2019-04-01 NOTE — LETTER
April 2, 2019                     [facility]  [address]  [facility] [address]  Phone: [phone]   Patient: Cesario Bailey   MR Number: [id number]   YOB: 1965   Date of Visit: 4/1/2019       Dear      Thank you for referring Cesario Bailey to me for evaluation. Attached you will find relevant portions of my assessment and plan of care.    If you have questions, please do not hesitate to call me. I look forward to following Cesario Bailey along with you.    Sincerely,    Oni Faulkner MD    Enclosure    If you would like to receive this communication electronically, please contact [email or web address] or [phone] to request [employer] access.    [employer] is a tool which provides read-only access to select patient information with whom you have a relationship. Its easy to use and provides real time access to review your patients record including encounter summaries, notes, results, and demographic information.    If you feel you have received this communication in error or would no longer like to receive these types of communications, please e-mail [email or web address]

## 2019-04-01 NOTE — Clinical Note
Doing well, will need to review his most recent CT from [place]. It is not our system yet. I will place IR request. Please upload the CT.

## 2019-04-02 ENCOUNTER — TELEPHONE (OUTPATIENT)
Dept: TRANSPLANT | Facility: CLINIC | Age: 54
End: 2019-04-02

## 2019-04-02 NOTE — TELEPHONE ENCOUNTER
Discussion with Dr. Faulkner regarding Mr. Bruno Zelaya's case.  Patient's Abdominal and Chest CT's will be reviewed in IR conference on April 9. Dr. Faulkner would like patient to be made Status 7 until films reviewed. Will communicate with Procurement and with patient this temporary status change. We will also continue to pursue Meld exception.

## 2019-04-02 NOTE — TELEPHONE ENCOUNTER
..  Patient: Cesario Bailey       MRN: [id number]      : 1965     Age: 53 y.o.  [address]   [place] [address]    Provider: Hepatologist Luh Faulkner    Urgency of review: urgent    Patient Transplant Status: Listed    Reason for presentation: Reassessment    Clinical Summary: Patient was diagnosed with alcohol-induced cirrhosis about 5 years ago. He stopped drinking since. He developed ascites, that resolved later. He had an episode variceal bleed in 2016 - EVL done. Has been on nadolol since.   Liver decompensations have been controlled but developed HCC on surveillance screening - 2.1 cm with HCC features. He had TACE on 18 in [place].           Imaging to be reviewed: CT ABD/Pelvis 2019 and CT Chest 2019    HCC Treatment History: Tace 2018, Tace 2019    ABO: A POS    Platelets:   Lab Results   Component Value Date/Time    PLT 54 (L) 2019 10:36 AM     Creatinine:   Lab Results   Component Value Date/Time    CREATININE 0.9 2019 10:36 AM     Bilirubin:   Lab Results   Component Value Date/Time    BILITOT 3.0 (H) 2019 10:36 AM     AFP Last 3 each if available:   Lab Results   Component Value Date/Time    AFP 2.6 2019 10:36 AM    AFP 3.2 2019 08:41 AM    AFP 3.3 2018 11:51 AM       MELD: MELD-Na score: 13 at 2019 10:36 AM  MELD score: 13 at 2019 10:36 AM  Calculated from:  Serum Creatinine: 0.9 mg/dL (Rounded to 1 mg/dL) at 2019 10:36 AM  Serum Sodium: 138 mmol/L (Rounded to 137 mmol/L) at 2019 10:36 AM  Total Bilirubin: 3.0 mg/dL at 2019 10:36 AM  INR(ratio): 1.2 at 2019 10:36 AM  Age: 53 years    Plan:     Follow-up Provider:

## 2019-04-05 NOTE — TELEPHONE ENCOUNTER
Patient: Cesario Bailey       MRN: [id number]      : 1965     Age: 53 y.o.  [address]   [place] [address]    Provider: Hepatologist Luh Faulkner    Urgency of review: urgent    Patient Transplant Status: Listed    Reason for presentation: Reassessment    Clinical Summary: Patient was diagnosed with alcohol-induced cirrhosis about 5 years ago. He stopped drinking since. He developed ascites, that resolved later. He had an episode variceal bleed in 2016 - EVL done. Has been on nadolol since.   Liver decompensations have been controlled but developed HCC on surveillance screening - 2.1 cm with HCC features. He had TACE on 18 in [place].     Imaging to be reviewed: CT ABD/Pelvis 2019 and CT Chest 2019    HCC Treatment History: Tace 2018, Tace 2019    ABO: A POS    Platelets:   Lab Results   Component Value Date/Time    PLT 54 (L) 2019 10:36 AM     Creatinine:   Lab Results   Component Value Date/Time    CREATININE 0.9 2019 10:36 AM     Bilirubin:   Lab Results   Component Value Date/Time    BILITOT 3.0 (H) 2019 10:36 AM     AFP Last 3 each if available:   Lab Results   Component Value Date/Time    AFP 2.6 2019 10:36 AM    AFP 3.2 2019 08:41 AM    AFP 3.3 2018 11:51 AM       MELD: MELD-Na score: 13 at 2019 10:36 AM  MELD score: 13 at 2019 10:36 AM  Calculated from:  Serum Creatinine: 0.9 mg/dL (Rounded to 1 mg/dL) at 2019 10:36 AM  Serum Sodium: 138 mmol/L (Rounded to 137 mmol/L) at 2019 10:36 AM  Total Bilirubin: 3.0 mg/dL at 2019 10:36 AM  INR(ratio): 1.2 at 2019 10:36 AM  Age: 53 years    Plan: CT A/P shows no evidence of residual disease or recurrence. There is partial PVT extending to SMV which is unchanged since 2019 but more pronounced since 2018. CT chest findings are non-specific.    Committee discussion----stable, bland, partial PVT extending to SMV. More pronounced since November.   Nonspecific  chest.  No evidence of residual tumor.  OK to request MELD exception points.  Continue surveillance       ALEXIS Arnold RN notified.    Follow-up Provider: Oni Faulkner MD

## 2019-04-09 ENCOUNTER — TELEPHONE (OUTPATIENT)
Dept: TRANSPLANT | Facility: CLINIC | Age: 54
End: 2019-04-09

## 2019-04-09 ENCOUNTER — CONFERENCE (OUTPATIENT)
Dept: TRANSPLANT | Facility: CLINIC | Age: 54
End: 2019-04-09

## 2019-04-09 NOTE — TELEPHONE ENCOUNTER
Received communication from IR conference that patient films reviewed and patient clear to be active on waitlist. Will communicate with procurement that patient may be active on waitlist by email.

## 2019-04-20 ENCOUNTER — ANESTHESIA EVENT (OUTPATIENT)
Dept: SURGERY | Facility: HOSPITAL | Age: 54
End: 2019-04-20

## 2019-04-20 ENCOUNTER — TELEPHONE (OUTPATIENT)
Dept: TRANSPLANT | Facility: CLINIC | Age: 54
End: 2019-04-20

## 2019-04-20 ENCOUNTER — ANESTHESIA (OUTPATIENT)
Dept: SURGERY | Facility: HOSPITAL | Age: 54
End: 2019-04-20

## 2019-04-20 ENCOUNTER — HOSPITAL ENCOUNTER (INPATIENT)
Facility: HOSPITAL | Age: 54
LOS: 1 days | Discharge: HOME | DRG: 441 | End: 2019-04-20
Attending: TRANSPLANT SURGERY | Admitting: TRANSPLANT SURGERY
Payer: COMMERCIAL

## 2019-04-20 VITALS
OXYGEN SATURATION: 100 % | SYSTOLIC BLOOD PRESSURE: 135 MMHG | RESPIRATION RATE: 16 BRPM | HEIGHT: 67 IN | TEMPERATURE: 99 F | BODY MASS INDEX: 27.11 KG/M2 | DIASTOLIC BLOOD PRESSURE: 60 MMHG | HEART RATE: 62 BPM | WEIGHT: 172.75 LBS

## 2019-04-20 DIAGNOSIS — C22.0 HCC (HEPATOCELLULAR CARCINOMA): Primary | ICD-10-CM

## 2019-04-20 DIAGNOSIS — Z76.82 ORGAN TRANSPLANT CANDIDATE: ICD-10-CM

## 2019-04-20 LAB
ABO + RH BLD: NORMAL
ALBUMIN SERPL BCP-MCNC: 2.8 G/DL (ref 3.5–5.2)
ALP SERPL-CCNC: 110 U/L (ref 55–135)
ALT SERPL W/O P-5'-P-CCNC: 55 U/L (ref 10–44)
ANION GAP SERPL CALC-SCNC: 6 MMOL/L (ref 8–16)
APTT BLDCRRT: 26.2 SEC (ref 21–32)
AST SERPL-CCNC: 84 U/L (ref 10–40)
BASOPHILS # BLD AUTO: 0.01 K/UL (ref 0–0.2)
BASOPHILS NFR BLD: 0.2 % (ref 0–1.9)
BILIRUB DIRECT SERPL-MCNC: 1.2 MG/DL (ref 0.1–0.3)
BILIRUB SERPL-MCNC: 2.6 MG/DL (ref 0.1–1)
BILIRUB UR QL STRIP: NEGATIVE
BLD GP AB SCN CELLS X3 SERPL QL: NORMAL
BUN SERPL-MCNC: 11 MG/DL (ref 6–20)
CALCIUM SERPL-MCNC: 8.7 MG/DL (ref 8.7–10.5)
CHLORIDE SERPL-SCNC: 103 MMOL/L (ref 95–110)
CLARITY UR REFRACT.AUTO: CLEAR
CO2 SERPL-SCNC: 28 MMOL/L (ref 23–29)
COLOR UR AUTO: ABNORMAL
CREAT SERPL-MCNC: 1 MG/DL (ref 0.5–1.4)
DIFFERENTIAL METHOD: ABNORMAL
EOSINOPHIL # BLD AUTO: 0.3 K/UL (ref 0–0.5)
EOSINOPHIL NFR BLD: 6.9 % (ref 0–8)
ERYTHROCYTE [DISTWIDTH] IN BLOOD BY AUTOMATED COUNT: 13.8 % (ref 11.5–14.5)
EST. GFR  (AFRICAN AMERICAN): >60 ML/MIN/1.73 M^2
EST. GFR  (NON AFRICAN AMERICAN): >60 ML/MIN/1.73 M^2
ESTIMATED AVG GLUCOSE: 151 MG/DL (ref 68–131)
FIBRINOGEN PPP-MCNC: 245 MG/DL (ref 182–366)
GLUCOSE SERPL-MCNC: 238 MG/DL (ref 70–110)
GLUCOSE UR QL STRIP: ABNORMAL
HBA1C MFR BLD HPLC: 6.9 % (ref 4–5.6)
HCT VFR BLD AUTO: 39 % (ref 40–54)
HGB BLD-MCNC: 13.6 G/DL (ref 14–18)
HGB UR QL STRIP: ABNORMAL
IMM GRANULOCYTES # BLD AUTO: 0.02 K/UL (ref 0–0.04)
IMM GRANULOCYTES NFR BLD AUTO: 0.4 % (ref 0–0.5)
INR PPP: 1.2 (ref 0.8–1.2)
KETONES UR QL STRIP: NEGATIVE
LEUKOCYTE ESTERASE UR QL STRIP: NEGATIVE
LYMPHOCYTES # BLD AUTO: 0.6 K/UL (ref 1–4.8)
LYMPHOCYTES NFR BLD: 12.2 % (ref 18–48)
MAGNESIUM SERPL-MCNC: 1.4 MG/DL (ref 1.6–2.6)
MCH RBC QN AUTO: 33.7 PG (ref 27–31)
MCHC RBC AUTO-ENTMCNC: 34.9 G/DL (ref 32–36)
MCV RBC AUTO: 97 FL (ref 82–98)
MICROSCOPIC COMMENT: NORMAL
MONOCYTES # BLD AUTO: 0.3 K/UL (ref 0.3–1)
MONOCYTES NFR BLD: 7.1 % (ref 4–15)
NEUTROPHILS # BLD AUTO: 3.3 K/UL (ref 1.8–7.7)
NEUTROPHILS NFR BLD: 73.2 % (ref 38–73)
NITRITE UR QL STRIP: NEGATIVE
NRBC BLD-RTO: 0 /100 WBC
PH UR STRIP: 8 [PH] (ref 5–8)
PLATELET # BLD AUTO: 46 K/UL (ref 150–350)
PMV BLD AUTO: 12.1 FL (ref 9.2–12.9)
POTASSIUM SERPL-SCNC: 3.7 MMOL/L (ref 3.5–5.1)
PROT SERPL-MCNC: 6.1 G/DL (ref 6–8.4)
PROT UR QL STRIP: NEGATIVE
PROTHROMBIN TIME: 11.9 SEC (ref 9–12.5)
RBC # BLD AUTO: 4.04 M/UL (ref 4.6–6.2)
RBC #/AREA URNS AUTO: 2 /HPF (ref 0–4)
SODIUM SERPL-SCNC: 137 MMOL/L (ref 136–145)
SP GR UR STRIP: 1 (ref 1–1.03)
URN SPEC COLLECT METH UR: ABNORMAL
WBC # BLD AUTO: 4.5 K/UL (ref 3.9–12.7)
WBC #/AREA URNS AUTO: 1 /HPF (ref 0–5)

## 2019-04-20 PROCEDURE — 85384 FIBRINOGEN ACTIVITY: CPT | Mod: NTX

## 2019-04-20 PROCEDURE — 99000 SPECIMEN HANDLING OFFICE-LAB: CPT | Mod: NTX

## 2019-04-20 PROCEDURE — 93005 ELECTROCARDIOGRAM TRACING: CPT | Mod: NTX

## 2019-04-20 PROCEDURE — 82248 BILIRUBIN DIRECT: CPT | Mod: NTX

## 2019-04-20 PROCEDURE — 83036 HEMOGLOBIN GLYCOSYLATED A1C: CPT | Mod: NTX

## 2019-04-20 PROCEDURE — 80053 COMPREHEN METABOLIC PANEL: CPT | Mod: NTX

## 2019-04-20 PROCEDURE — 20600001 HC STEP DOWN PRIVATE ROOM: Mod: NTX

## 2019-04-20 PROCEDURE — 85025 COMPLETE CBC W/AUTO DIFF WBC: CPT | Mod: NTX

## 2019-04-20 PROCEDURE — 93010 ELECTROCARDIOGRAM REPORT: CPT | Mod: NTX,,, | Performed by: INTERNAL MEDICINE

## 2019-04-20 PROCEDURE — 86901 BLOOD TYPING SEROLOGIC RH(D): CPT | Mod: NTX

## 2019-04-20 PROCEDURE — 83735 ASSAY OF MAGNESIUM: CPT | Mod: NTX

## 2019-04-20 PROCEDURE — 93010 EKG 12-LEAD: ICD-10-PCS | Mod: NTX,,, | Performed by: INTERNAL MEDICINE

## 2019-04-20 PROCEDURE — 85730 THROMBOPLASTIN TIME PARTIAL: CPT | Mod: NTX

## 2019-04-20 PROCEDURE — 36415 COLL VENOUS BLD VENIPUNCTURE: CPT | Mod: NTX

## 2019-04-20 PROCEDURE — 81001 URINALYSIS AUTO W/SCOPE: CPT | Mod: NTX

## 2019-04-20 PROCEDURE — 85610 PROTHROMBIN TIME: CPT | Mod: NTX

## 2019-04-20 RX ORDER — HYDROCODONE BITARTRATE AND ACETAMINOPHEN 500; 5 MG/1; MG/1
TABLET ORAL
Status: DISCONTINUED | OUTPATIENT
Start: 2019-04-20 | End: 2019-04-20

## 2019-04-20 RX ORDER — METHYLPREDNISOLONE SODIUM SUCCINATE 500 MG/8ML
500 INJECTION INTRAMUSCULAR; INTRAVENOUS
Status: DISCONTINUED | OUTPATIENT
Start: 2019-04-20 | End: 2019-04-21 | Stop reason: HOSPADM

## 2019-04-20 RX ORDER — HYDROCODONE BITARTRATE AND ACETAMINOPHEN 500; 5 MG/1; MG/1
TABLET ORAL
Status: DISCONTINUED | OUTPATIENT
Start: 2019-04-20 | End: 2019-04-21 | Stop reason: HOSPADM

## 2019-04-20 NOTE — H&P
[facility]  Liver Transplant  History & Physical    Patient Name: Cesario Bailey  MRN: [id number]  Admission Date: 4/20/2019  Code Status: Full Code  Primary Care Provider: Primary Doctor No    Subjective:     History of Present Illness:   53 year old male with pmhx of etoh cirrhosis, portal HTN, esophageal varices, and ESLD who presents for liver transplant.     Clinical Summary: Patient was diagnosed with alcohol-induced cirrhosis about 5 years ago. He stopped drinking since. He developed ascites, that resolved later, required paracentesis x 1. Also thoracentesis x 2 three years ago. He had an episode variceal bleed in 2016 - EVL done. Has been on nadolol since. Liver decompensations have been controlled but developed HCC on surveillance screening - 2.1 cm with HCC features.     HCC Treatment History: Tace 6/11/2018, Tace 2/4/2019. Last CT A/P shows no evidence of residual dx or recurrence but does show a partial PVT extending to the SMV, unchanged since 1/2019.       Past Medical History:   Diagnosis Date    Alcoholic cirrhosis     Encounter for blood transfusion     HCC (hepatocellular carcinoma)     Portal hypertension        Past Surgical History:   Procedure Laterality Date    COLONOSCOPY N/A 7/23/2018    Performed by [facility]ain [facility] at [facility] (4TH FLR)    ESOPHAGOGASTRODUODENOSCOPY (EGD) N/A 7/23/2018    Performed by Romain G[facility]ora MD at [facility] ENDO (4TH FLR)    HERNIA REPAIR      umbilical hernia repair, large mesh placed.        Review of patient's allergies indicates:   Allergen Reactions    Aspirin        Family History     Problem Relation (Age of Onset)    Diabetes Father    Heart disease Mother    Hyperlipidemia Mother    Hypertension Mother    Stroke Mother        Tobacco Use    Smoking status: Never Smoker    Smokeless tobacco: Never Used   Substance and Sexual Activity    Alcohol use: Not on file    Drug use: Not on file    Sexual activity: Not on file        PTA Medications   Medication Sig    furosemide (LASIX) 40 MG tablet Take 40 mg by mouth once daily.    insulin glargine (LANTUS) 100 unit/mL injection Inject 5 Units into the skin every evening.    insulin regular 100 unit/mL Inj injection Inject 20 Units into the skin 3 (three) times daily before meals.    lactulose (CHRONULAC) 20 gram/30 mL Soln Take 30 mLs (20 g total) by mouth 2 (two) times daily.    nadolol (CORGARD) 20 MG tablet Take 20 mg by mouth once daily.    rifAXIMin (XIFAXAN) 550 mg Tab Take 550 mg by mouth 2 (two) times daily.    spironolactone (ALDACTONE) 100 MG tablet Take 100 mg by mouth once daily.       Review of Systems   Constitutional: Negative for activity change, chills and fever.   HENT: Negative for congestion.    Respiratory: Negative for cough and shortness of breath.    Cardiovascular: Negative for chest pain and leg swelling.   Gastrointestinal: Negative for abdominal distention, abdominal pain, nausea and vomiting.   Musculoskeletal: Negative for back pain.   Skin: Negative for color change.     Objective:     Vital Signs (Most Recent):  Temp: 99 °F (37.2 °C) (04/20/19 1332)  Pulse: 60 (04/20/19 1332)  Resp: 18 (04/20/19 1332)  BP: 125/61 (04/20/19 1332)  SpO2: 100 % (04/20/19 1332) Vital Signs (24h Range):  Temp:  [98.5 °F (36.9 °C)-99 °F (37.2 °C)] 99 °F (37.2 °C)  Pulse:  [60-68] 60  Resp:  [18] 18  SpO2:  [99 %-100 %] 100 %  BP: (123-125)/(59-61) 125/61     Weight: 78.4 kg (172 lb 11.7 oz)  Body mass index is 26.67 kg/m².    No intake or output data in the 24 hours ending 04/20/19 1513    Physical Exam   Constitutional: He is oriented to person, place, and time. He appears well-developed and well-nourished. No distress.   HENT:   Head: Normocephalic and atraumatic.   Eyes: No scleral icterus.   Cardiovascular: Normal rate, regular rhythm and intact distal pulses.   Pulses:       Radial pulses are 2+ on the right side, and 2+ on the left side.   Pulmonary/Chest: Effort  normal. No respiratory distress.   Abdominal: Soft. He exhibits no distension. There is no tenderness. There is no rebound and no guarding. No hernia.       Musculoskeletal: He exhibits no edema.   Neurological: He is alert and oriented to person, place, and time.   Skin: Skin is warm. Capillary refill takes less than 2 seconds.   Nursing note and vitals reviewed.      Laboratory:  CBC:   Recent Labs   Lab 04/20/19  1315   WBC 4.50   RBC 4.04*   HGB 13.6*   HCT 39.0*   PLT 46*   MCV 97   MCH 33.7*   MCHC 34.9     CMP:   Recent Labs   Lab 04/20/19  1315   *   CALCIUM 8.7   ALBUMIN 2.8*   PROT 6.1      K 3.7   CO2 28      BUN 11   CREATININE 1.0   ALKPHOS 110   ALT 55*   AST 84*   BILITOT 2.6*     Cardiac Markers: No results for input(s): CKMB, TROPONINT, MYOGLOBIN in the last 168 hours.  ABGs: No results for input(s): PH, PCO2, HCO3, POCSATURATED, BE in the last 168 hours.  Labs within the past 24 hours have been reviewed.    Diagnostic Results:  CXR: wnl  EKG: NSR    I have personally reviewed all pertinent imaging studies.    Assessment/Plan:     * HCC (hepatocellular carcinoma)  52 y/o male with ESLD 2/2 EtOH cirrhosis with HCC s/p TACE who presents for OLT.    - admit to TSU  - pre-op orders and case request placed  - consents signed using   - pre-op labs and imaging reviewed  - no contraindications to proceed with OLT, cut time at 1230 4/21  - NPO        The patient presents for liver transplant.  There are no apparent contraindications to proceeding with the planned transplant.  The patient understands that the transplant could potentially be cancelled pending detailed assessment of the donor organ.    MELD-Na score: 12 at 4/20/2019  1:15 PM  MELD score: 12 at 4/20/2019  1:15 PM  Calculated from:  Serum Creatinine: 1.0 mg/dL at 4/20/2019  1:15 PM  Serum Sodium: 137 mmol/L at 4/20/2019  1:15 PM  Total Bilirubin: 2.6 mg/dL at 4/20/2019  1:15 PM  INR(ratio): 1.2 at 4/20/2019  1:15  PM  Age: 53 years    He will receive IV steroids pulse induction.    A complete discussion of the transplant procedure, including risks, complications, and alternatives, as well as any donor-specific risk factors requiring specific disclosure, will be carried out by the responsible staff surgeon prior to the procedure.     Discharge Planning:  Ongoing    Chelo Nath MD  Liver Transplant  [facility]

## 2019-04-20 NOTE — ASSESSMENT & PLAN NOTE
52 y/o male with ESLD 2/2 EtOH cirrhosis with HCC s/p TACE who presents for OLT.    - admit to TSU  - pre-op orders and case request placed  - consents signed using   - pre-op labs and imaging reviewed  - no contraindications to proceed with OLT, cut time at 1230 4/21  - NPO

## 2019-04-20 NOTE — TELEPHONE ENCOUNTER
ORGAN OFFER NOTE    Notified by Earl Blood, , of liver offer with donor information.  Donor and recipient information read back and verified.  Spoke with Cesario Bailey and identified no acute medical issues during telephone assessment.  Patient instructed NPO. Patient instructed to leave within 30 minutes of this phone call and report to the ER/admit office.  Patient verbalized understanding and willingness to come in for transplant.  ETA: 30 min.

## 2019-04-20 NOTE — CONSULTS
"RD received consult for "transplant."  RD services to provide assessment and education s/p transplant.     Please re-consult s/p transplant.    Ninfa Tracey RD  Ext [phone]  "

## 2019-04-20 NOTE — ANESTHESIA PREPROCEDURE EVALUATION
[facility]  Anesthesia Pre-Operative Evaluation         Patient Name: Cesario Bailey  YOB: 1965  MRN: [id number]    SUBJECTIVE:     Pre-operative evaluation for Procedure(s) (LRB):  TRANSPLANT, LIVER (N/A)     04/20/2019    Cesario Bailey is a 53 y.o. male w/ a significant PMHx of EtOH cirrhosis, portal HTN, HCC    Patient now presents for the above procedure(s).      LDA: None documented.       Peripheral IV - Single Lumen 04/20/19 1517 22 G Anterior;Left Forearm (Active)   Number of days: 0       Prev airway: None documented.    Drips: None documented.      Patient Active Problem List   Diagnosis    HCC (hepatocellular carcinoma)    Alcoholic cirrhosis of liver without ascites    Organ transplant candidate    Portal hypertension    Thrombocytopenia due to hypersplenism    Lung nodule seen on imaging study    Hepatic encephalopathy       Review of patient's allergies indicates:   Allergen Reactions    Aspirin        Current Inpatient Medications:   methylPREDNISolone sodium succinate  500 mg Intravenous Once Pre-Op       Current Facility-Administered Medications on File Prior to Encounter   Medication Dose Route Frequency Provider Last Rate Last Dose    DOXOrubicin with LC beads chemoembolization  76 mg Intra-arterial Once Crystal Gomez DNP        And    DOXOrubicin with LC beads chemoembolization  76 mg Intra-arterial Once Crystal Gomez DNP         Current Outpatient Medications on File Prior to Encounter   Medication Sig Dispense Refill    furosemide (LASIX) 40 MG tablet Take 40 mg by mouth once daily.      insulin glargine (LANTUS) 100 unit/mL injection Inject 5 Units into the skin every evening.      insulin regular 100 unit/mL Inj injection Inject 20 Units into the skin 3 (three) times daily before meals.      lactulose (CHRONULAC) 20 gram/30 mL Soln Take 30 mLs (20 g total) by mouth 2 (two) times daily. 1000 mL 10    nadolol  (CORGARD) 20 MG tablet Take 20 mg by mouth once daily.      rifAXIMin (XIFAXAN) 550 mg Tab Take 550 mg by mouth 2 (two) times daily.      spironolactone (ALDACTONE) 100 MG tablet Take 100 mg by mouth once daily.         Past Surgical History:   Procedure Laterality Date    COLONOSCOPY N/A 7/23/2018    Performed by Romain Gongora MD at [facility] ENDO (4TH FLR)    ESOPHAGOGASTRODUODENOSCOPY (EGD) N/A 7/23/2018    Performed by [facility] at [facility] (4TH F[facility])    HERNIA REPAIR      umbilical hernia repair, large mesh placed.        Social History     Socioeconomic History    Marital status:      Spouse name: Not on file    Number of children: Not on file    Years of education: Not on file    Highest education level: Not on file   Occupational History    Not on file   Social Needs    Financial resource strain: Not on file    Food insecurity:     Worry: Not on file     Inability: Not on file    Transportation needs:     Medical: Not on file     Non-medical: Not on file   Tobacco Use    Smoking status: Never Smoker    Smokeless tobacco: Never Used   Substance and Sexual Activity    Alcohol use: Not on file    Drug use: Not on file    Sexual activity: Not on file   Lifestyle    Physical activity:     Days per week: Not on file     Minutes per session: Not on file    Stress: Not on file   Relationships    Social connections:     Talks on phone: Not on file     Gets together: Not on file     Attends Rastafarian service: Not on file     Active member of club or organization: Not on file     Attends meetings of clubs or organizations: Not on file     Relationship status: Not on file   Other Topics Concern    Not on file   Social History Narrative    Not on file       OBJECTIVE:     Vital Signs Range (Last 24H):  Temp:  [36.8 °C (98.2 °F)-37.2 °C (99 °F)]   Pulse:  [60-68]   Resp:  [16-18]   BP: (113-125)/(59-61)   SpO2:  [98 %-100 %]       Significant Labs:  Lab Results   Component Value Date     WBC 4.50 04/20/2019    HGB 13.6 (L) 04/20/2019    HCT 39.0 (L) 04/20/2019    PLT 46 (L) 04/20/2019    ALT 55 (H) 04/20/2019    AST 84 (H) 04/20/2019     04/20/2019    K 3.7 04/20/2019     04/20/2019    CREATININE 1.0 04/20/2019    BUN 11 04/20/2019    CO2 28 04/20/2019    TSH 1.066 07/16/2018    PSA 0.08 07/16/2018    INR 1.2 04/20/2019    HGBA1C 6.9 (H) 04/20/2019       Diagnostic Studies: No relevant studies.    EKG:   Normal sinus rhythm  Normal ECG  When compared with ECG of 02-AUG-2018 10:17,  Previous ECG has undetermined rhythm, needs review    2D ECHO:  No results found for this or any previous visit.      ASSESSMENT/PLAN:         Anesthesia Evaluation    I have reviewed the Patient Summary Reports.    I have reviewed the Nursing Notes.   I have reviewed the Medications.     Review of Systems  Anesthesia Hx:  History of prior surgery of interest to airway management or planning: Denies Family Hx of Anesthesia complications.   Denies Personal Hx of Anesthesia complications.   Hematology/Oncology:  Hematology Normal   Oncology Normal     EENT/Dental:EENT/Dental Normal   Cardiovascular:  Cardiovascular Normal     Pulmonary:  Pulmonary Normal    Renal/:  Renal/ Normal     Hepatic/GI:   Liver Disease,  Liver Disease, Alcoholic Liver Disease , Cirrhosis Portal Hypertension, Hepatocellular Carcinoma    Musculoskeletal:  Musculoskeletal Normal    Neurological:  Neurology Normal    Endocrine:  Endocrine Normal    Dermatological:  Skin Normal    Psych:  Psychiatric Normal           Physical Exam  General:  Well nourished    Airway/Jaw/Neck:  Airway Findings: Mouth Opening: Normal Tongue: Normal  General Airway Assessment: Adult  Mallampati: I  TM Distance: Normal, at least 6 cm  Jaw/Neck Findings:  Neck ROM: Normal ROM  Neck Findings: Normal    Eyes/Ears/Nose:  EYES/EARS/NOSE FINDINGS: Normal   Dental:  Dental Findings: In tact   Chest/Lungs:  Chest/Lungs Findings: Clear to auscultation, Normal  Respiratory Rate     Heart/Vascular:  Heart Findings: Rate: Normal  Rhythm: Regular Rhythm        Mental Status:  Mental Status Findings: Normal        Anesthesia Plan  Type of Anesthesia, risks & benefits discussed:  Anesthesia Type:  general  Patient's Preference:   Intra-op Monitoring Plan: standard ASA monitors, arterial line and central line  Intra-op Monitoring Plan Comments:   Post Op Pain Control Plan: multimodal analgesia, IV/PO Opioids PRN and per primary service following discharge from PACU  Post Op Pain Control Plan Comments:   Induction:   IV  Beta Blocker:  Patient is on a Beta-Blocker and has received one dose within the past 24 hours (No further documentation required).       Informed Consent: Patient understands risks and agrees with Anesthesia plan.  Questions answered. Anesthesia consent signed with patient.  ASA Score: 3     Day of Surgery Review of History & Physical:    H&P update referred to the surgeon.         Ready For Surgery From Anesthesia Perspective.

## 2019-04-20 NOTE — SUBJECTIVE & OBJECTIVE
Past Medical History:   Diagnosis Date    Alcoholic cirrhosis     Encounter for blood transfusion     HCC (hepatocellular carcinoma)     Portal hypertension        Past Surgical History:   Procedure Laterality Date    COLONOSCOPY N/A 7/23/2018    Performed by Romain G[facility]ora MD at [facility] ENDO (4TH FLR)    ESOPHAGOGASTRODUODENOSCOPY (EGD) N/A 7/23/2018    Performed by Romain Gongora MD at [facility] ENDO (4TH FLR)    HERNIA REPAIR      umbilical hernia repair, large mesh placed.        Review of patient's allergies indicates:   Allergen Reactions    Aspirin        Family History     Problem Relation (Age of Onset)    Diabetes Father    Heart disease Mother    Hyperlipidemia Mother    Hypertension Mother    Stroke Mother        Tobacco Use    Smoking status: Never Smoker    Smokeless tobacco: Never Used   Substance and Sexual Activity    Alcohol use: Not on file    Drug use: Not on file    Sexual activity: Not on file       PTA Medications   Medication Sig    furosemide (LASIX) 40 MG tablet Take 40 mg by mouth once daily.    insulin glargine (LANTUS) 100 unit/mL injection Inject 5 Units into the skin every evening.    insulin regular 100 unit/mL Inj injection Inject 20 Units into the skin 3 (three) times daily before meals.    lactulose (CHRONULAC) 20 gram/30 mL Soln Take 30 mLs (20 g total) by mouth 2 (two) times daily.    nadolol (CORGARD) 20 MG tablet Take 20 mg by mouth once daily.    rifAXIMin (XIFAXAN) 550 mg Tab Take 550 mg by mouth 2 (two) times daily.    spironolactone (ALDACTONE) 100 MG tablet Take 100 mg by mouth once daily.       Review of Systems   Constitutional: Negative for activity change, chills and fever.   HENT: Negative for congestion.    Respiratory: Negative for cough and shortness of breath.    Cardiovascular: Negative for chest pain and leg swelling.   Gastrointestinal: Negative for abdominal distention, abdominal pain, nausea and vomiting.   Musculoskeletal: Negative for back  pain.   Skin: Negative for color change.     Objective:     Vital Signs (Most Recent):  Temp: 99 °F (37.2 °C) (04/20/19 1332)  Pulse: 60 (04/20/19 1332)  Resp: 18 (04/20/19 1332)  BP: 125/61 (04/20/19 1332)  SpO2: 100 % (04/20/19 1332) Vital Signs (24h Range):  Temp:  [98.5 °F (36.9 °C)-99 °F (37.2 °C)] 99 °F (37.2 °C)  Pulse:  [60-68] 60  Resp:  [18] 18  SpO2:  [99 %-100 %] 100 %  BP: (123-125)/(59-61) 125/61     Weight: 78.4 kg (172 lb 11.7 oz)  Body mass index is 26.67 kg/m².    No intake or output data in the 24 hours ending 04/20/19 1513    Physical Exam   Constitutional: He is oriented to person, place, and time. He appears well-developed and well-nourished. No distress.   HENT:   Head: Normocephalic and atraumatic.   Eyes: No scleral icterus.   Cardiovascular: Normal rate, regular rhythm and intact distal pulses.   Pulses:       Radial pulses are 2+ on the right side, and 2+ on the left side.   Pulmonary/Chest: Effort normal. No respiratory distress.   Abdominal: Soft. He exhibits no distension. There is no tenderness. There is no rebound and no guarding. No hernia.       Musculoskeletal: He exhibits no edema.   Neurological: He is alert and oriented to person, place, and time.   Skin: Skin is warm. Capillary refill takes less than 2 seconds.   Nursing note and vitals reviewed.      Laboratory:  CBC:   Recent Labs   Lab 04/20/19  1315   WBC 4.50   RBC 4.04*   HGB 13.6*   HCT 39.0*   PLT 46*   MCV 97   MCH 33.7*   MCHC 34.9     CMP:   Recent Labs   Lab 04/20/19  1315   *   CALCIUM 8.7   ALBUMIN 2.8*   PROT 6.1      K 3.7   CO2 28      BUN 11   CREATININE 1.0   ALKPHOS 110   ALT 55*   AST 84*   BILITOT 2.6*     Cardiac Markers: No results for input(s): CKMB, TROPONINT, MYOGLOBIN in the last 168 hours.  ABGs: No results for input(s): PH, PCO2, HCO3, POCSATURATED, BE in the last 168 hours.  Labs within the past 24 hours have been reviewed.    Diagnostic Results:  CXR: wnl  EKG: NSR    I have  personally reviewed all pertinent imaging studies.

## 2019-04-20 NOTE — HPI
53 year old male with pmhx of etoh cirrhosis, portal HTN, esophageal varices, and ESLD who presents for liver transplant.     Clinical Summary: Patient was diagnosed with alcohol-induced cirrhosis about 5 years ago. He stopped drinking since. He developed ascites, that resolved later, required paracentesis x 1. Also thoracentesis x 2 three years ago. He had an episode variceal bleed in 2016 - EVL done. Has been on nadolol since. Liver decompensations have been controlled but developed HCC on surveillance screening - 2.1 cm with HCC features.     HCC Treatment History: Tace 6/11/2018, Tace 2/4/2019. Last CT A/P shows no evidence of residual dx or recurrence but does show a partial PVT extending to the SMV, unchanged since 1/2019.

## 2019-04-20 NOTE — PLAN OF CARE
Problem: Adult Inpatient Plan of Care  Goal: Plan of Care Review  Outcome: Ongoing (interventions implemented as appropriate)  VSS. Call light in reach. Cut time of 2300. No issues. WCTM.

## 2019-04-21 NOTE — DISCHARGE SUMMARY
[facility]  Liver Transplant  Discharge Summary      Patient Name: Cesario Bailey  MRN: [id number]  Admission Date: 4/20/2019  Hospital Length of Stay: 0 days  Discharge Date and Time:  04/20/2019 9:23 PM  Attending Physician: Da Velásquez Jr., MD   Discharging Provider: Chelo Nath MD  Primary Care Provider: Primary Doctor No    HPI:    53 year old male with pmhx of etoh cirrhosis, portal HTN, esophageal varices, and ESLD who presents for liver transplant.     Clinical Summary: Patient was diagnosed with alcohol-induced cirrhosis about 5 years ago. He stopped drinking since. He developed ascites, that resolved later, required paracentesis x 1. Also thoracentesis x 2 three years ago. He had an episode variceal bleed in 2016 - EVL done. Has been on nadolol since. Liver decompensations have been controlled but developed HCC on surveillance screening - 2.1 cm with HCC features.     HCC Treatment History: Tace 6/11/2018, Tace 2/4/2019. Last CT A/P shows no evidence of residual dx or recurrence but does show a partial PVT extending to the SMV, unchanged since 1/2019.       Procedure(s) (LRB):  TRANSPLANT, LIVER (N/A)     Hospital Course:    Unable to receive transplant offer due to size mismatch.    Final Active Diagnoses:    Diagnosis Date Noted POA    PRINCIPAL PROBLEM:  HCC (hepatocellular carcinoma) [C22.0] 07/16/2018 Yes      Problems Resolved During this Admission:       Consults (From admission, onward)        Status Ordering Provider     Inpatient consult to Registered Dietitian/Nutritionist  Once     Provider:  (Not yet assigned)    Completed LUPE CHAVIRA          Pending Diagnostic Studies:     Procedure Component Value Units Date/Time    Freeze and Hold -BB HEP [[id number]]     Order Status:  Sent Lab Status:  No result     Specimen:  Blood         Significant Diagnostic Studies: none    Discharged Condition: good    Disposition: home    Follow Up: prn     Patient Instructions:  none    Medications:  Reconciled Home Medications:      Medication List      CONTINUE taking these medications    ALDACTONE 100 MG tablet  Generic drug:  spironolactone  Take 100 mg by mouth once daily.     furosemide 40 MG tablet  Commonly known as:  LASIX  Take 40 mg by mouth once daily.     insulin glargine 100 unit/mL injection  Commonly known as:  LANTUS  Inject 5 Units into the skin every evening.     insulin regular 100 unit/mL injection  Commonly known as:  Humulin R  Inject 20 Units into the skin 3 (three) times daily before meals.     lactulose 20 gram/30 mL Soln  Commonly known as:  CHRONULAC  Take 30 mLs (20 g total) by mouth 2 (two) times daily.     nadolol 20 MG tablet  Commonly known as:  CORGARD  Take 20 mg by mouth once daily.     XIFAXAN 550 mg Tab  Generic drug:  rifAXIMin  Take 550 mg by mouth 2 (two) times daily.          Time spent caring for patient (Greater than 1/2 spent in direct face-to-face contact): < 30 minutes    Chelo Nath MD  Liver Transplant  [facility]

## 2019-04-21 NOTE — NURSING
Patient Algerian speaking only via Starla ( [phone]) introductions made patient stated he had no pain or needs at this time. Will monitor.

## 2019-04-21 NOTE — TELEPHONE ENCOUNTER
Received notice from the  Jose Juan who is visualizing the donor liver. Instructed to release the primary recipient Mr. Bruno Zelaya due to liver size. Merged calls to Mr. Zelaya and  Michaelrachelle to interpret explanation for cancellation of offer. Patient verbalized understanding. Allowed for questions. Patient asked if he would still be able to receive offers. He was answered yes. Denied any other questions. Surgery resident will be notified that he can be discharged.

## 2019-04-25 DIAGNOSIS — Z76.82 ORGAN TRANSPLANT CANDIDATE: Primary | ICD-10-CM

## 2019-04-26 ENCOUNTER — TELEPHONE (OUTPATIENT)
Dept: TRANSPLANT | Facility: CLINIC | Age: 54
End: 2019-04-26

## 2019-04-26 ENCOUNTER — ANESTHESIA EVENT (OUTPATIENT)
Dept: SURGERY | Facility: HOSPITAL | Age: 54
DRG: 005 | End: 2019-04-26
Payer: COMMERCIAL

## 2019-04-26 ENCOUNTER — HOSPITAL ENCOUNTER (INPATIENT)
Facility: HOSPITAL | Age: 54
LOS: 8 days | Discharge: HOME | DRG: 005 | End: 2019-05-04
Attending: SURGERY | Admitting: SURGERY
Payer: COMMERCIAL

## 2019-04-26 ENCOUNTER — ANESTHESIA (OUTPATIENT)
Dept: SURGERY | Facility: HOSPITAL | Age: 54
DRG: 005 | End: 2019-04-26
Payer: COMMERCIAL

## 2019-04-26 DIAGNOSIS — E87.20 ACIDOSIS: ICD-10-CM

## 2019-04-26 DIAGNOSIS — E83.42 HYPOMAGNESEMIA: ICD-10-CM

## 2019-04-26 DIAGNOSIS — D73.1 THROMBOCYTOPENIA DUE TO HYPERSPLENISM: ICD-10-CM

## 2019-04-26 DIAGNOSIS — Z79.4 TYPE 2 DIABETES MELLITUS WITH HYPERGLYCEMIA, WITH LONG-TERM CURRENT USE OF INSULIN: ICD-10-CM

## 2019-04-26 DIAGNOSIS — Z79.60 LONG-TERM USE OF IMMUNOSUPPRESSANT MEDICATION: ICD-10-CM

## 2019-04-26 DIAGNOSIS — E66.09 CLASS 1 OBESITY DUE TO EXCESS CALORIES WITH SERIOUS COMORBIDITY AND BODY MASS INDEX (BMI) OF 30.0 TO 30.9 IN ADULT: ICD-10-CM

## 2019-04-26 DIAGNOSIS — K72.90 LIVER FAILURE: ICD-10-CM

## 2019-04-26 DIAGNOSIS — Z94.4 LIVER TRANSPLANTED: Primary | ICD-10-CM

## 2019-04-26 DIAGNOSIS — D62 ACUTE BLOOD LOSS ANEMIA: ICD-10-CM

## 2019-04-26 DIAGNOSIS — K56.7 ILEUS: ICD-10-CM

## 2019-04-26 DIAGNOSIS — E11.65 TYPE 2 DIABETES MELLITUS WITH HYPERGLYCEMIA, WITH LONG-TERM CURRENT USE OF INSULIN: ICD-10-CM

## 2019-04-26 DIAGNOSIS — D69.59 THROMBOCYTOPENIA DUE TO HYPERSPLENISM: ICD-10-CM

## 2019-04-26 DIAGNOSIS — Z94.4 LIVER REPLACED BY TRANSPLANT: ICD-10-CM

## 2019-04-26 DIAGNOSIS — D63.8 ANEMIA OF CHRONIC DISEASE: ICD-10-CM

## 2019-04-26 DIAGNOSIS — T38.0X5D ADVERSE EFFECT OF CORTICOSTEROIDS, SUBSEQUENT ENCOUNTER: ICD-10-CM

## 2019-04-26 DIAGNOSIS — Z29.89 PROPHYLACTIC IMMUNOTHERAPY: ICD-10-CM

## 2019-04-26 PROBLEM — K76.82 HEPATIC ENCEPHALOPATHY: Status: RESOLVED | Noted: 2019-01-13 | Resolved: 2019-04-26

## 2019-04-26 PROBLEM — K70.30 ALCOHOLIC CIRRHOSIS OF LIVER WITHOUT ASCITES: Status: RESOLVED | Noted: 2018-07-16 | Resolved: 2019-04-26

## 2019-04-26 PROBLEM — Z76.82 ORGAN TRANSPLANT CANDIDATE: Status: RESOLVED | Noted: 2018-07-16 | Resolved: 2019-04-26

## 2019-04-26 PROBLEM — K76.6 PORTAL HYPERTENSION: Status: RESOLVED | Noted: 2018-07-16 | Resolved: 2019-04-26

## 2019-04-26 LAB
ABO + RH BLD: NORMAL
ALBUMIN SERPL BCP-MCNC: 1.8 G/DL (ref 3.5–5.2)
ALBUMIN SERPL BCP-MCNC: 2.1 G/DL (ref 3.5–5.2)
ALBUMIN SERPL BCP-MCNC: 2.5 G/DL (ref 3.5–5.2)
ALBUMIN SERPL BCP-MCNC: 3.3 G/DL (ref 3.5–5.2)
ALP SERPL-CCNC: 121 U/L (ref 55–135)
ALT SERPL W/O P-5'-P-CCNC: 2549 U/L (ref 10–44)
ALT SERPL W/O P-5'-P-CCNC: 63 U/L (ref 10–44)
ANION GAP SERPL CALC-SCNC: 7 MMOL/L (ref 8–16)
APTT BLDCRRT: 25.9 SEC (ref 21–32)
APTT BLDCRRT: 27.2 SEC (ref 21–32)
APTT BLDCRRT: 39.6 SEC (ref 21–32)
APTT BLDCRRT: 40.6 SEC (ref 21–32)
AST SERPL-CCNC: 115 U/L (ref 10–40)
AST SERPL-CCNC: 3555 U/L (ref 10–40)
BACTERIA #/AREA URNS AUTO: NORMAL /HPF
BASOPHILS # BLD AUTO: 0.03 K/UL (ref 0–0.2)
BASOPHILS NFR BLD: 0.6 % (ref 0–1.9)
BILIRUB DIRECT SERPL-MCNC: 1 MG/DL (ref 0.1–0.3)
BILIRUB SERPL-MCNC: 2.6 MG/DL (ref 0.1–1)
BILIRUB UR QL STRIP: NEGATIVE
BLD GP AB SCN CELLS X3 SERPL QL: NORMAL
BLD PROD TYP BPU: NORMAL
BLD PROD TYP BPU: NORMAL
BLOOD UNIT EXPIRATION DATE: NORMAL
BLOOD UNIT EXPIRATION DATE: NORMAL
BLOOD UNIT TYPE CODE: 6200
BLOOD UNIT TYPE CODE: 6200
BLOOD UNIT TYPE: NORMAL
BLOOD UNIT TYPE: NORMAL
BUN SERPL-MCNC: 10 MG/DL (ref 6–20)
CA-I BLDV-SCNC: 1.04 MMOL/L (ref 1.06–1.42)
CA-I BLDV-SCNC: 1.14 MMOL/L (ref 1.06–1.42)
CA-I BLDV-SCNC: 1.22 MMOL/L (ref 1.06–1.42)
CALCIUM SERPL-MCNC: 9.3 MG/DL (ref 8.7–10.5)
CHLORIDE SERPL-SCNC: 103 MMOL/L (ref 95–110)
CLARITY UR REFRACT.AUTO: CLEAR
CO2 SERPL-SCNC: 26 MMOL/L (ref 23–29)
CODING SYSTEM: NORMAL
CODING SYSTEM: NORMAL
COLOR UR AUTO: NORMAL
CREAT SERPL-MCNC: 1 MG/DL (ref 0.5–1.4)
DIFFERENTIAL METHOD: ABNORMAL
DISPENSE STATUS: NORMAL
DISPENSE STATUS: NORMAL
EOSINOPHIL # BLD AUTO: 0.4 K/UL (ref 0–0.5)
EOSINOPHIL NFR BLD: 8 % (ref 0–8)
ERYTHROCYTE [DISTWIDTH] IN BLOOD BY AUTOMATED COUNT: 14.4 % (ref 11.5–14.5)
EST. GFR  (AFRICAN AMERICAN): >60 ML/MIN/1.73 M^2
EST. GFR  (NON AFRICAN AMERICAN): >60 ML/MIN/1.73 M^2
ESTIMATED AVG GLUCOSE: 151 MG/DL (ref 68–131)
FIBRINOGEN PPP-MCNC: 112 MG/DL (ref 182–366)
FIBRINOGEN PPP-MCNC: 224 MG/DL (ref 182–366)
FIBRINOGEN PPP-MCNC: 272 MG/DL (ref 182–366)
FIBRINOGEN PPP-MCNC: 88 MG/DL (ref 182–366)
GLUCOSE SERPL-MCNC: 156 MG/DL (ref 70–110)
GLUCOSE SERPL-MCNC: 158 MG/DL (ref 70–110)
GLUCOSE SERPL-MCNC: 283 MG/DL (ref 70–110)
GLUCOSE SERPL-MCNC: 284 MG/DL (ref 70–110)
GLUCOSE UR QL STRIP: NEGATIVE
HBA1C MFR BLD HPLC: 6.9 % (ref 4–5.6)
HCT VFR BLD AUTO: 28.5 % (ref 40–54)
HCT VFR BLD AUTO: 28.6 % (ref 40–54)
HCT VFR BLD AUTO: 33 % (ref 40–54)
HCT VFR BLD AUTO: 41.1 % (ref 40–54)
HGB BLD-MCNC: 10.3 G/DL (ref 14–18)
HGB BLD-MCNC: 10.5 G/DL (ref 14–18)
HGB BLD-MCNC: 12.1 G/DL (ref 14–18)
HGB BLD-MCNC: 14.7 G/DL (ref 14–18)
HGB UR QL STRIP: NEGATIVE
HIV 1+2 AB+HIV1 P24 AG SERPL QL IA: NEGATIVE
IMM GRANULOCYTES # BLD AUTO: 0.01 K/UL (ref 0–0.04)
IMM GRANULOCYTES NFR BLD AUTO: 0.2 % (ref 0–0.5)
INR PPP: 1.2 (ref 0.8–1.2)
INR PPP: 1.3 (ref 0.8–1.2)
INR PPP: 1.9 (ref 0.8–1.2)
INR PPP: 2 (ref 0.8–1.2)
KETONES UR QL STRIP: NEGATIVE
LEUKOCYTE ESTERASE UR QL STRIP: NEGATIVE
LYMPHOCYTES # BLD AUTO: 0.7 K/UL (ref 1–4.8)
LYMPHOCYTES NFR BLD: 14.4 % (ref 18–48)
MAGNESIUM SERPL-MCNC: 1.1 MG/DL (ref 1.6–2.6)
MAGNESIUM SERPL-MCNC: 1.7 MG/DL (ref 1.6–2.6)
MAGNESIUM SERPL-MCNC: 1.8 MG/DL (ref 1.6–2.6)
MAGNESIUM SERPL-MCNC: 2 MG/DL (ref 1.6–2.6)
MCH RBC QN AUTO: 34.5 PG (ref 27–31)
MCHC RBC AUTO-ENTMCNC: 35.8 G/DL (ref 32–36)
MCV RBC AUTO: 97 FL (ref 82–98)
MICROSCOPIC COMMENT: NORMAL
MONOCYTES # BLD AUTO: 0.3 K/UL (ref 0.3–1)
MONOCYTES NFR BLD: 6.2 % (ref 4–15)
NEUTROPHILS # BLD AUTO: 3.3 K/UL (ref 1.8–7.7)
NEUTROPHILS NFR BLD: 70.6 % (ref 38–73)
NITRITE UR QL STRIP: NEGATIVE
NRBC BLD-RTO: 0 /100 WBC
PH UR STRIP: 6 [PH] (ref 5–8)
PLATELET # BLD AUTO: 43 K/UL (ref 150–350)
PLATELET # BLD AUTO: 58 K/UL (ref 150–350)
PLATELET # BLD AUTO: 62 K/UL (ref 150–350)
PLATELET # BLD AUTO: 67 K/UL (ref 150–350)
PMV BLD AUTO: 11.9 FL (ref 9.2–12.9)
PMV BLD AUTO: 12 FL (ref 9.2–12.9)
PMV BLD AUTO: 12.2 FL (ref 9.2–12.9)
PMV BLD AUTO: 12.6 FL (ref 9.2–12.9)
POCT GLUCOSE: 162 MG/DL (ref 70–110)
POTASSIUM SERPL-SCNC: 3.4 MMOL/L (ref 3.5–5.1)
POTASSIUM SERPL-SCNC: 3.9 MMOL/L (ref 3.5–5.1)
POTASSIUM SERPL-SCNC: 4.9 MMOL/L (ref 3.5–5.1)
POTASSIUM SERPL-SCNC: 5 MMOL/L (ref 3.5–5.1)
PROT SERPL-MCNC: 7.5 G/DL (ref 6–8.4)
PROT UR QL STRIP: NEGATIVE
PROTHROMBIN TIME: 11.8 SEC (ref 9–12.5)
PROTHROMBIN TIME: 12.7 SEC (ref 9–12.5)
PROTHROMBIN TIME: 18.1 SEC (ref 9–12.5)
PROTHROMBIN TIME: 19.8 SEC (ref 9–12.5)
RBC # BLD AUTO: 4.26 M/UL (ref 4.6–6.2)
RBC #/AREA URNS AUTO: 2 /HPF (ref 0–4)
SODIUM SERPL-SCNC: 134 MMOL/L (ref 136–145)
SODIUM SERPL-SCNC: 135 MMOL/L (ref 136–145)
SODIUM SERPL-SCNC: 135 MMOL/L (ref 136–145)
SODIUM SERPL-SCNC: 136 MMOL/L (ref 136–145)
SP GR UR STRIP: 1.01 (ref 1–1.03)
TRANS PLATPHERESIS VOL PATIENT: NORMAL ML
UNIT NUMBER: NORMAL
URN SPEC COLLECT METH UR: NORMAL
WBC # BLD AUTO: 4.65 K/UL (ref 3.9–12.7)
WBC #/AREA URNS AUTO: 1 /HPF (ref 0–5)

## 2019-04-26 PROCEDURE — 85002 BLEEDING TIME TEST: CPT

## 2019-04-26 PROCEDURE — 84295 ASSAY OF SERUM SODIUM: CPT | Mod: 91

## 2019-04-26 PROCEDURE — 63600175 PHARM REV CODE 636 W HCPCS: Mod: NTX | Performed by: STUDENT IN AN ORGANIZED HEALTH CARE EDUCATION/TRAINING PROGRAM

## 2019-04-26 PROCEDURE — 84460 ALANINE AMINO (ALT) (SGPT): CPT

## 2019-04-26 PROCEDURE — 27800506 HC CATH, RAPID INFUSION (7.5&8.5): Mod: NTX | Performed by: NURSE ANESTHETIST, CERTIFIED REGISTERED

## 2019-04-26 PROCEDURE — 85384 FIBRINOGEN ACTIVITY: CPT | Mod: 91

## 2019-04-26 PROCEDURE — 27100025 HC TUBING, SET FLUID WARMER: Mod: NTX | Performed by: NURSE ANESTHETIST, CERTIFIED REGISTERED

## 2019-04-26 PROCEDURE — P9012 CRYOPRECIPITATE EACH UNIT: HCPCS

## 2019-04-26 PROCEDURE — 76937 US GUIDE VASCULAR ACCESS: CPT | Mod: 26,,, | Performed by: ANESTHESIOLOGY

## 2019-04-26 PROCEDURE — 85049 AUTOMATED PLATELET COUNT: CPT

## 2019-04-26 PROCEDURE — 63600175 PHARM REV CODE 636 W HCPCS: Mod: JG | Performed by: SURGERY

## 2019-04-26 PROCEDURE — 36556 PR INSERT NON-TUNNEL CV CATH 5+ YRS OLD: ICD-10-PCS | Mod: 59,,, | Performed by: ANESTHESIOLOGY

## 2019-04-26 PROCEDURE — 85025 COMPLETE CBC W/AUTO DIFF WBC: CPT | Mod: NTX

## 2019-04-26 PROCEDURE — P9021 RED BLOOD CELLS UNIT: HCPCS

## 2019-04-26 PROCEDURE — 83735 ASSAY OF MAGNESIUM: CPT | Mod: 91

## 2019-04-26 PROCEDURE — 63600175 PHARM REV CODE 636 W HCPCS: Performed by: NURSE ANESTHETIST, CERTIFIED REGISTERED

## 2019-04-26 PROCEDURE — P9047 ALBUMIN (HUMAN), 25%, 50ML: HCPCS | Mod: JG | Performed by: SURGERY

## 2019-04-26 PROCEDURE — 36000930 HC OR TIME LEV VII 1ST 15 MIN: Performed by: SURGERY

## 2019-04-26 PROCEDURE — 88304 TISSUE SPECIMEN TO PATHOLOGY - SURGERY: ICD-10-PCS | Mod: 26,,, | Performed by: PATHOLOGY

## 2019-04-26 PROCEDURE — 76937 PR  US GUIDE, VASCULAR ACCESS: ICD-10-PCS | Mod: 26,,, | Performed by: ANESTHESIOLOGY

## 2019-04-26 PROCEDURE — P9017 PLASMA 1 DONOR FRZ W/IN 8 HR: HCPCS

## 2019-04-26 PROCEDURE — 85384 FIBRINOGEN ACTIVITY: CPT | Mod: NTX

## 2019-04-26 PROCEDURE — 27201423 OPTIME MED/SURG SUP & DEVICES STERILE SUPPLY: Performed by: SURGERY

## 2019-04-26 PROCEDURE — 88309 TISSUE EXAM BY PATHOLOGIST: CPT | Mod: 26,,, | Performed by: PATHOLOGY

## 2019-04-26 PROCEDURE — C1729 CATH, DRAINAGE: HCPCS | Performed by: SURGERY

## 2019-04-26 PROCEDURE — 86850 RBC ANTIBODY SCREEN: CPT | Mod: NTX

## 2019-04-26 PROCEDURE — C1751 CATH, INF, PER/CENT/MIDLINE: HCPCS | Mod: NTX | Performed by: NURSE ANESTHETIST, CERTIFIED REGISTERED

## 2019-04-26 PROCEDURE — 88307 TISSUE EXAM BY PATHOLOGIST: CPT | Mod: 26,,, | Performed by: PATHOLOGY

## 2019-04-26 PROCEDURE — 25000003 PHARM REV CODE 250: Performed by: NURSE ANESTHETIST, CERTIFIED REGISTERED

## 2019-04-26 PROCEDURE — 37000009 HC ANESTHESIA EA ADD 15 MINS: Performed by: SURGERY

## 2019-04-26 PROCEDURE — C1894 INTRO/SHEATH, NON-LASER: HCPCS | Mod: NTX | Performed by: NURSE ANESTHETIST, CERTIFIED REGISTERED

## 2019-04-26 PROCEDURE — 37000008 HC ANESTHESIA 1ST 15 MINUTES: Performed by: SURGERY

## 2019-04-26 PROCEDURE — 36556 INSERT NON-TUNNEL CV CATH: CPT | Mod: 59,,, | Performed by: ANESTHESIOLOGY

## 2019-04-26 PROCEDURE — 88313 SPECIAL STAINS GROUP 2: CPT | Mod: 26,,, | Performed by: PATHOLOGY

## 2019-04-26 PROCEDURE — 88309 TISSUE SPECIMEN TO PATHOLOGY - SURGERY: ICD-10-PCS | Mod: 26,,, | Performed by: PATHOLOGY

## 2019-04-26 PROCEDURE — 85730 THROMBOPLASTIN TIME PARTIAL: CPT | Mod: 91

## 2019-04-26 PROCEDURE — 27000191 HC C-V MONITORING

## 2019-04-26 PROCEDURE — 87522 HEPATITIS C REVRS TRNSCRPJ: CPT | Mod: NTX

## 2019-04-26 PROCEDURE — 82330 ASSAY OF CALCIUM: CPT | Mod: 91

## 2019-04-26 PROCEDURE — 47143 PR TRANSPLANT,PREP DONOR LIVER, WHOLE: ICD-10-PCS | Mod: 51,,, | Performed by: TRANSPLANT SURGERY

## 2019-04-26 PROCEDURE — 93010 ELECTROCARDIOGRAM REPORT: CPT | Mod: NTX,,, | Performed by: INTERNAL MEDICINE

## 2019-04-26 PROCEDURE — 83735 ASSAY OF MAGNESIUM: CPT | Mod: NTX

## 2019-04-26 PROCEDURE — 27100021 HC MULTIPORT INFUSION MANIFOLD: Mod: NTX | Performed by: NURSE ANESTHETIST, CERTIFIED REGISTERED

## 2019-04-26 PROCEDURE — P9035 PLATELET PHERES LEUKOREDUCED: HCPCS

## 2019-04-26 PROCEDURE — 36620 PR INSERT CATH,ART,PERCUT,SHORTTERM: ICD-10-PCS | Mod: 59,,, | Performed by: ANESTHESIOLOGY

## 2019-04-26 PROCEDURE — 88313 TISSUE SPECIMEN TO PATHOLOGY - SURGERY: ICD-10-PCS | Mod: 26,,, | Performed by: PATHOLOGY

## 2019-04-26 PROCEDURE — 93503 INSERT/PLACE HEART CATHETER: CPT | Mod: 59,,, | Performed by: ANESTHESIOLOGY

## 2019-04-26 PROCEDURE — 84450 TRANSFERASE (AST) (SGOT): CPT

## 2019-04-26 PROCEDURE — 85014 HEMATOCRIT: CPT | Mod: 91

## 2019-04-26 PROCEDURE — 82040 ASSAY OF SERUM ALBUMIN: CPT | Mod: 91

## 2019-04-26 PROCEDURE — 27201041 HC RESERVOIR, CARDIOTOMY

## 2019-04-26 PROCEDURE — 86920 COMPATIBILITY TEST SPIN: CPT

## 2019-04-26 PROCEDURE — 82248 BILIRUBIN DIRECT: CPT | Mod: NTX

## 2019-04-26 PROCEDURE — 36415 COLL VENOUS BLD VENIPUNCTURE: CPT | Mod: NTX

## 2019-04-26 PROCEDURE — D9220A PRA ANESTHESIA: Mod: ,,, | Performed by: ANESTHESIOLOGY

## 2019-04-26 PROCEDURE — 47135 TRANSPLANTATION OF LIVER: CPT | Mod: ,,, | Performed by: SURGERY

## 2019-04-26 PROCEDURE — 82947 ASSAY GLUCOSE BLOOD QUANT: CPT

## 2019-04-26 PROCEDURE — 27100088 HC CELL SAVER

## 2019-04-26 PROCEDURE — 87086 URINE CULTURE/COLONY COUNT: CPT | Mod: NTX

## 2019-04-26 PROCEDURE — 84132 ASSAY OF SERUM POTASSIUM: CPT | Mod: 91

## 2019-04-26 PROCEDURE — 36000931 HC OR TIME LEV VII EA ADD 15 MIN: Performed by: SURGERY

## 2019-04-26 PROCEDURE — 27200675 HC TRANSDUCER MONITOR KIT 4 LINES: Mod: NTX | Performed by: NURSE ANESTHETIST, CERTIFIED REGISTERED

## 2019-04-26 PROCEDURE — 93005 ELECTROCARDIOGRAM TRACING: CPT | Mod: NTX

## 2019-04-26 PROCEDURE — 88304 TISSUE EXAM BY PATHOLOGIST: CPT | Mod: 26,,, | Performed by: PATHOLOGY

## 2019-04-26 PROCEDURE — 80053 COMPREHEN METABOLIC PANEL: CPT | Mod: NTX

## 2019-04-26 PROCEDURE — 12000002 HC ACUTE/MED SURGE SEMI-PRIVATE ROOM

## 2019-04-26 PROCEDURE — 85610 PROTHROMBIN TIME: CPT | Mod: NTX

## 2019-04-26 PROCEDURE — 93010 EKG 12-LEAD: ICD-10-PCS | Mod: NTX,,, | Performed by: INTERNAL MEDICINE

## 2019-04-26 PROCEDURE — P9045 ALBUMIN (HUMAN), 5%, 250 ML: HCPCS | Mod: JG | Performed by: NURSE ANESTHETIST, CERTIFIED REGISTERED

## 2019-04-26 PROCEDURE — 47135 TRANSPLANTATION OF LIVER: CPT | Mod: 82,,, | Performed by: TRANSPLANT SURGERY

## 2019-04-26 PROCEDURE — 81001 URINALYSIS AUTO W/SCOPE: CPT | Mod: NTX

## 2019-04-26 PROCEDURE — 93503 PR INSERT/PLACE FLOW DIRECT CATH: ICD-10-PCS | Mod: 59,,, | Performed by: ANESTHESIOLOGY

## 2019-04-26 PROCEDURE — 88307 TISSUE SPECIMEN TO PATHOLOGY - SURGERY: ICD-10-PCS | Mod: 26,,, | Performed by: PATHOLOGY

## 2019-04-26 PROCEDURE — 88307 TISSUE EXAM BY PATHOLOGIST: CPT | Performed by: PATHOLOGY

## 2019-04-26 PROCEDURE — 86703 HIV-1/HIV-2 1 RESULT ANTBDY: CPT | Mod: NTX

## 2019-04-26 PROCEDURE — 47135 PR TRANSPLANT LIVER,ALLOTRANSPLANT: ICD-10-PCS | Mod: 82,,, | Performed by: TRANSPLANT SURGERY

## 2019-04-26 PROCEDURE — 86965 POOLING BLOOD PLATELETS: CPT

## 2019-04-26 PROCEDURE — 82962 GLUCOSE BLOOD TEST: CPT | Performed by: SURGERY

## 2019-04-26 PROCEDURE — 85730 THROMBOPLASTIN TIME PARTIAL: CPT | Mod: NTX

## 2019-04-26 PROCEDURE — 85520 HEPARIN ASSAY: CPT

## 2019-04-26 PROCEDURE — 47135 PR TRANSPLANT LIVER,ALLOTRANSPLANT: ICD-10-PCS | Mod: ,,, | Performed by: SURGERY

## 2019-04-26 PROCEDURE — 85018 HEMOGLOBIN: CPT

## 2019-04-26 PROCEDURE — D9220A PRA ANESTHESIA: ICD-10-PCS | Mod: ,,, | Performed by: ANESTHESIOLOGY

## 2019-04-26 PROCEDURE — 87517 HEPATITIS B DNA QUANT: CPT | Mod: NTX

## 2019-04-26 PROCEDURE — 25000003 PHARM REV CODE 250: Performed by: SURGERY

## 2019-04-26 PROCEDURE — 83036 HEMOGLOBIN GLYCOSYLATED A1C: CPT | Mod: NTX

## 2019-04-26 PROCEDURE — 85610 PROTHROMBIN TIME: CPT | Mod: 91

## 2019-04-26 PROCEDURE — 36620 INSERTION CATHETER ARTERY: CPT | Mod: 59,,, | Performed by: ANESTHESIOLOGY

## 2019-04-26 RX ORDER — MIDAZOLAM HYDROCHLORIDE 1 MG/ML
INJECTION, SOLUTION INTRAMUSCULAR; INTRAVENOUS
Status: DISCONTINUED | OUTPATIENT
Start: 2019-04-26 | End: 2019-04-26

## 2019-04-26 RX ORDER — ALBUMIN HUMAN 250 G/1000ML
SOLUTION INTRAVENOUS
Status: COMPLETED | OUTPATIENT
Start: 2019-04-26 | End: 2019-04-26

## 2019-04-26 RX ORDER — ONDANSETRON 2 MG/ML
INJECTION INTRAMUSCULAR; INTRAVENOUS
Status: DISCONTINUED | OUTPATIENT
Start: 2019-04-26 | End: 2019-04-26

## 2019-04-26 RX ORDER — ROCURONIUM BROMIDE 10 MG/ML
INJECTION, SOLUTION INTRAVENOUS
Status: DISCONTINUED | OUTPATIENT
Start: 2019-04-26 | End: 2019-04-26

## 2019-04-26 RX ORDER — PHENYLEPHRINE HYDROCHLORIDE 10 MG/ML
INJECTION INTRAVENOUS
Status: DISCONTINUED | OUTPATIENT
Start: 2019-04-26 | End: 2019-04-26

## 2019-04-26 RX ORDER — MANNITOL 250 MG/ML
INJECTION, SOLUTION INTRAVENOUS
Status: DISCONTINUED | OUTPATIENT
Start: 2019-04-26 | End: 2019-04-26

## 2019-04-26 RX ORDER — HYDROCODONE BITARTRATE AND ACETAMINOPHEN 500; 5 MG/1; MG/1
TABLET ORAL
Status: DISCONTINUED | OUTPATIENT
Start: 2019-04-26 | End: 2019-04-29

## 2019-04-26 RX ORDER — DEXTROSE MONOHYDRATE AND SODIUM CHLORIDE 5; .45 G/100ML; G/100ML
INJECTION, SOLUTION INTRAVENOUS CONTINUOUS
Status: DISCONTINUED | OUTPATIENT
Start: 2019-04-26 | End: 2019-04-27

## 2019-04-26 RX ORDER — NICARDIPINE HYDROCHLORIDE 2.5 MG/ML
INJECTION INTRAVENOUS
Status: DISCONTINUED | OUTPATIENT
Start: 2019-04-26 | End: 2019-04-26 | Stop reason: HOSPADM

## 2019-04-26 RX ORDER — PROPOFOL 10 MG/ML
VIAL (ML) INTRAVENOUS
Status: DISCONTINUED | OUTPATIENT
Start: 2019-04-26 | End: 2019-04-26

## 2019-04-26 RX ORDER — SODIUM CHLORIDE 9 MG/ML
INJECTION, SOLUTION INTRAVENOUS CONTINUOUS PRN
Status: DISCONTINUED | OUTPATIENT
Start: 2019-04-26 | End: 2019-04-26

## 2019-04-26 RX ORDER — METHYLPREDNISOLONE SODIUM SUCCINATE 1 G/16ML
INJECTION INTRAMUSCULAR; INTRAVENOUS
Status: DISCONTINUED | OUTPATIENT
Start: 2019-04-26 | End: 2019-04-26

## 2019-04-26 RX ORDER — METHYLPREDNISOLONE SODIUM SUCCINATE 500 MG/8ML
500 INJECTION INTRAMUSCULAR; INTRAVENOUS
Status: CANCELLED | OUTPATIENT
Start: 2019-04-26 | End: 2019-04-26

## 2019-04-26 RX ORDER — FENTANYL CITRATE 50 UG/ML
INJECTION, SOLUTION INTRAMUSCULAR; INTRAVENOUS
Status: DISCONTINUED | OUTPATIENT
Start: 2019-04-26 | End: 2019-04-26

## 2019-04-26 RX ORDER — HEPARIN SODIUM 1000 [USP'U]/ML
INJECTION, SOLUTION INTRAVENOUS; SUBCUTANEOUS
Status: DISCONTINUED | OUTPATIENT
Start: 2019-04-26 | End: 2019-04-26

## 2019-04-26 RX ORDER — VASOPRESSIN 20 [USP'U]/ML
INJECTION, SOLUTION INTRAMUSCULAR; SUBCUTANEOUS
Status: DISCONTINUED | OUTPATIENT
Start: 2019-04-26 | End: 2019-04-26

## 2019-04-26 RX ORDER — HEPARIN SODIUM 1000 [USP'U]/ML
INJECTION, SOLUTION INTRAVENOUS; SUBCUTANEOUS
Status: DISCONTINUED | OUTPATIENT
Start: 2019-04-26 | End: 2019-04-26 | Stop reason: HOSPADM

## 2019-04-26 RX ORDER — FUROSEMIDE 10 MG/ML
INJECTION INTRAMUSCULAR; INTRAVENOUS
Status: DISCONTINUED | OUTPATIENT
Start: 2019-04-26 | End: 2019-04-26

## 2019-04-26 RX ORDER — ALBUMIN HUMAN 50 G/1000ML
SOLUTION INTRAVENOUS CONTINUOUS PRN
Status: DISCONTINUED | OUTPATIENT
Start: 2019-04-26 | End: 2019-04-26

## 2019-04-26 RX ORDER — LIDOCAINE HCL/PF 100 MG/5ML
SYRINGE (ML) INTRAVENOUS
Status: DISCONTINUED | OUTPATIENT
Start: 2019-04-26 | End: 2019-04-26

## 2019-04-26 RX ORDER — PROPOFOL 10 MG/ML
VIAL (ML) INTRAVENOUS CONTINUOUS PRN
Status: DISCONTINUED | OUTPATIENT
Start: 2019-04-26 | End: 2019-04-26

## 2019-04-26 RX ORDER — MAGNESIUM SULFATE HEPTAHYDRATE 40 MG/ML
INJECTION, SOLUTION INTRAVENOUS
Status: DISCONTINUED | OUTPATIENT
Start: 2019-04-26 | End: 2019-04-26

## 2019-04-26 RX ADMIN — CALCIUM CHLORIDE 1 G: 100 INJECTION, SOLUTION INTRAVENOUS at 08:04

## 2019-04-26 RX ADMIN — VASOPRESSIN 2 UNITS: 20 INJECTION INTRAVENOUS at 07:04

## 2019-04-26 RX ADMIN — PROPOFOL 25 MCG/KG/MIN: 10 INJECTION, EMULSION INTRAVENOUS at 11:04

## 2019-04-26 RX ADMIN — MANNITOL 19.5 G: 250 INJECTION, SOLUTION INTRAVENOUS at 06:04

## 2019-04-26 RX ADMIN — ALBUMIN (HUMAN): 12.5 SOLUTION INTRAVENOUS at 07:04

## 2019-04-26 RX ADMIN — SODIUM CHLORIDE, SODIUM GLUCONATE, SODIUM ACETATE, POTASSIUM CHLORIDE, MAGNESIUM CHLORIDE, SODIUM PHOSPHATE, DIBASIC, AND POTASSIUM PHOSPHATE: .53; .5; .37; .037; .03; .012; .00082 INJECTION, SOLUTION INTRAVENOUS at 07:04

## 2019-04-26 RX ADMIN — VASOPRESSIN 2 UNITS: 20 INJECTION INTRAVENOUS at 09:04

## 2019-04-26 RX ADMIN — SODIUM CHLORIDE, SODIUM GLUCONATE, SODIUM ACETATE, POTASSIUM CHLORIDE, MAGNESIUM CHLORIDE, SODIUM PHOSPHATE, DIBASIC, AND POTASSIUM PHOSPHATE: .53; .5; .37; .037; .03; .012; .00082 INJECTION, SOLUTION INTRAVENOUS at 09:04

## 2019-04-26 RX ADMIN — PHENYLEPHRINE HYDROCHLORIDE 200 MCG: 10 INJECTION INTRAVENOUS at 08:04

## 2019-04-26 RX ADMIN — ROCURONIUM BROMIDE 50 MG: 10 INJECTION, SOLUTION INTRAVENOUS at 08:04

## 2019-04-26 RX ADMIN — METHYLPREDNISOLONE SODIUM SUCCINATE 500 MG: 1 INJECTION, POWDER, LYOPHILIZED, FOR SOLUTION INTRAMUSCULAR; INTRAVENOUS at 06:04

## 2019-04-26 RX ADMIN — PROPOFOL 170 MG: 10 INJECTION, EMULSION INTRAVENOUS at 05:04

## 2019-04-26 RX ADMIN — AMPICILLIN SODIUM AND SULBACTAM SODIUM 3 G: 2; 1 INJECTION, POWDER, FOR SOLUTION INTRAMUSCULAR; INTRAVENOUS at 09:04

## 2019-04-26 RX ADMIN — FUROSEMIDE 78 MG: 10 INJECTION, SOLUTION INTRAMUSCULAR; INTRAVENOUS at 08:04

## 2019-04-26 RX ADMIN — VASOPRESSIN 1 UNITS: 20 INJECTION INTRAVENOUS at 09:04

## 2019-04-26 RX ADMIN — CALCIUM CHLORIDE 0.5 G: 100 INJECTION, SOLUTION INTRAVENOUS at 09:04

## 2019-04-26 RX ADMIN — MAGNESIUM SULFATE IN WATER 2 G: 40 INJECTION, SOLUTION INTRAVENOUS at 06:04

## 2019-04-26 RX ADMIN — SODIUM CHLORIDE, SODIUM GLUCONATE, SODIUM ACETATE, POTASSIUM CHLORIDE, MAGNESIUM CHLORIDE, SODIUM PHOSPHATE, DIBASIC, AND POTASSIUM PHOSPHATE: .53; .5; .37; .037; .03; .012; .00082 INJECTION, SOLUTION INTRAVENOUS at 08:04

## 2019-04-26 RX ADMIN — SODIUM CHLORIDE 0.25 MCG/KG/MIN: 9 INJECTION, SOLUTION INTRAVENOUS at 07:04

## 2019-04-26 RX ADMIN — HEPARIN SODIUM 2000 UNITS: 1000 INJECTION, SOLUTION INTRAVENOUS; SUBCUTANEOUS at 08:04

## 2019-04-26 RX ADMIN — VASOPRESSIN 2 UNITS/HR: 20 INJECTION INTRAVENOUS at 06:04

## 2019-04-26 RX ADMIN — MANNITOL 19.5 G: 250 INJECTION, SOLUTION INTRAVENOUS at 08:04

## 2019-04-26 RX ADMIN — ALBUMIN (HUMAN): 12.5 SOLUTION INTRAVENOUS at 06:04

## 2019-04-26 RX ADMIN — MIDAZOLAM HYDROCHLORIDE 2 MG: 1 INJECTION, SOLUTION INTRAMUSCULAR; INTRAVENOUS at 05:04

## 2019-04-26 RX ADMIN — FENTANYL CITRATE 50 MCG: 50 INJECTION, SOLUTION INTRAMUSCULAR; INTRAVENOUS at 08:04

## 2019-04-26 RX ADMIN — PHENYLEPHRINE HYDROCHLORIDE 100 MCG: 10 INJECTION INTRAVENOUS at 06:04

## 2019-04-26 RX ADMIN — FENTANYL CITRATE 150 MCG: 50 INJECTION, SOLUTION INTRAMUSCULAR; INTRAVENOUS at 05:04

## 2019-04-26 RX ADMIN — SODIUM CHLORIDE, SODIUM GLUCONATE, SODIUM ACETATE, POTASSIUM CHLORIDE, MAGNESIUM CHLORIDE, SODIUM PHOSPHATE, DIBASIC, AND POTASSIUM PHOSPHATE: .53; .5; .37; .037; .03; .012; .00082 INJECTION, SOLUTION INTRAVENOUS at 06:04

## 2019-04-26 RX ADMIN — FUROSEMIDE 78 MG: 10 INJECTION, SOLUTION INTRAMUSCULAR; INTRAVENOUS at 06:04

## 2019-04-26 RX ADMIN — ROCURONIUM BROMIDE 100 MG: 10 INJECTION, SOLUTION INTRAVENOUS at 05:04

## 2019-04-26 RX ADMIN — CALCIUM CHLORIDE 0.5 G: 100 INJECTION, SOLUTION INTRAVENOUS at 08:04

## 2019-04-26 RX ADMIN — MIDAZOLAM HYDROCHLORIDE 2 MG: 1 INJECTION, SOLUTION INTRAMUSCULAR; INTRAVENOUS at 10:04

## 2019-04-26 RX ADMIN — PHENYLEPHRINE HYDROCHLORIDE 100 MCG: 10 INJECTION INTRAVENOUS at 08:04

## 2019-04-26 RX ADMIN — AMPICILLIN SODIUM AND SULBACTAM SODIUM 3 G: 2; 1 INJECTION, POWDER, FOR SOLUTION INTRAMUSCULAR; INTRAVENOUS at 06:04

## 2019-04-26 RX ADMIN — FENTANYL CITRATE 100 MCG: 50 INJECTION, SOLUTION INTRAMUSCULAR; INTRAVENOUS at 07:04

## 2019-04-26 RX ADMIN — FENTANYL CITRATE 100 MCG: 50 INJECTION, SOLUTION INTRAMUSCULAR; INTRAVENOUS at 06:04

## 2019-04-26 RX ADMIN — FENTANYL CITRATE 100 MCG: 50 INJECTION, SOLUTION INTRAMUSCULAR; INTRAVENOUS at 11:04

## 2019-04-26 RX ADMIN — SODIUM CHLORIDE 2 UNITS/HR: 9 INJECTION, SOLUTION INTRAVENOUS at 09:04

## 2019-04-26 RX ADMIN — PHENYLEPHRINE HYDROCHLORIDE 200 MCG: 10 INJECTION INTRAVENOUS at 07:04

## 2019-04-26 RX ADMIN — ROCURONIUM BROMIDE 50 MG: 10 INJECTION, SOLUTION INTRAVENOUS at 09:04

## 2019-04-26 RX ADMIN — SODIUM CHLORIDE: 0.9 INJECTION, SOLUTION INTRAVENOUS at 05:04

## 2019-04-26 RX ADMIN — LIDOCAINE HYDROCHLORIDE 100 MG: 20 INJECTION, SOLUTION INTRAVENOUS at 05:04

## 2019-04-26 RX ADMIN — FENTANYL CITRATE 100 MCG: 50 INJECTION, SOLUTION INTRAMUSCULAR; INTRAVENOUS at 08:04

## 2019-04-26 NOTE — H&P
History & Physical   Liver Transplant Surgery      SUBJECTIVE:     Chief Complaint/Reason for Admission: Liver Transplantation     53 year old male with pmhx of etoh cirrhosis, portal HTN, esophageal varices, and ESLD who presents for liver transplant.      Clinical Summary: Patient was diagnosed with alcohol-induced cirrhosis about 5 years ago. He stopped drinking since. He developed ascites, that resolved later, required paracentesis x 1. Also thoracentesis x 2 three years ago. He had an episode variceal bleed in 2016 - EVL done. Has been on nadolol since. Liver decompensations have been controlled but developed HCC on surveillance screening - 2.1 cm with HCC features.      HCC Treatment History: Tace 6/11/2018, Tace 2/4/2019. Last CT A/P shows no evidence of residual dx or recurrence but does show a partial PVT extending to the SMV, unchanged since 1/2019.       MELD Score: MELD-Na score: 13 at 4/26/2019  1:25 PM  MELD score: 12 at 4/26/2019  1:25 PM  Calculated from:  Serum Creatinine: 1.0 mg/dL at 4/26/2019  1:25 PM  Serum Sodium: 136 mmol/L at 4/26/2019  1:25 PM  Total Bilirubin: 2.6 mg/dL at 4/26/2019  1:25 PM  INR(ratio): 1.2 at 4/26/2019  1:25 PM  Age: 53 years    PTA Medications   Medication Sig    furosemide (LASIX) 40 MG tablet Take 40 mg by mouth once daily.    insulin glargine (LANTUS) 100 unit/mL injection Inject 5 Units into the skin every evening.    insulin regular 100 unit/mL Inj injection Inject 20 Units into the skin 3 (three) times daily before meals.    lactulose (CHRONULAC) 20 gram/30 mL Soln Take 30 mLs (20 g total) by mouth 2 (two) times daily.    nadolol (CORGARD) 20 MG tablet Take 20 mg by mouth once daily.    rifAXIMin (XIFAXAN) 550 mg Tab Take 550 mg by mouth 2 (two) times daily.    spironolactone (ALDACTONE) 100 MG tablet Take 100 mg by mouth once daily.       Review of patient's allergies indicates:   Allergen Reactions    Aspirin        Past Medical History:   Diagnosis Date     Alcoholic cirrhosis     Diabetes mellitus     Encounter for blood transfusion     HCC (hepatocellular carcinoma)     Portal hypertension      Past Surgical History:   Procedure Laterality Date    COLONOSCOPY N/A 7/23/2018    Performed by Romain [facility]ora [facility] at [facility] ENDO (4TH FLR)    EMBOLIZATION      ESOPHAGOGASTRODUODENOSCOPY (EGD) N/A 7/23/2018    Performed by Romain G[facility]ora MD at [facility] ENDO (4TH FLR)    HERNIA REPAIR      umbilical hernia repair, large mesh placed.      Family History   Problem Relation Age of Onset    Heart disease Mother     Hyperlipidemia Mother     Hypertension Mother     Stroke Mother     Diabetes Father      Social History     Tobacco Use    Smoking status: Never Smoker    Smokeless tobacco: Never Used   Substance Use Topics    Alcohol use: Never     Frequency: Never    Drug use: Never      Review of Systems   Constitutional: Negative for activity change, chills and fever.   HENT: Negative for congestion.    Respiratory: Negative for cough and shortness of breath.    Cardiovascular: Negative for chest pain and leg swelling.   Gastrointestinal: Negative for abdominal distention, abdominal pain, nausea and vomiting.   Musculoskeletal: Negative for back pain.   Skin: Negative for color change.          OBJECTIVE:     Vital Signs (Most Recent)  Temp: 98.7 °F (37.1 °C) (04/26/19 1334)  Pulse: 61 (04/26/19 1334)  Resp: 18 (04/26/19 1334)  BP: 132/63 (04/26/19 1334)  SpO2: 100 % (04/26/19 1334)  Physical Exam   Constitutional: He is oriented to person, place, and time. He appears well-developed and well-nourished. No distress.   HENT:   Head: Normocephalic and atraumatic.   Eyes: No scleral icterus.   Cardiovascular: Normal rate, regular rhythm and intact distal pulses.   Pulses:       Radial pulses are 2+ on the right side, and 2+ on the left side.   Pulmonary/Chest: Effort normal. No respiratory distress.   Abdominal: Soft. He exhibits no distension. There is no tenderness.  There is no rebound and no guarding. No hernia.       Musculoskeletal: He exhibits no edema.   Neurological: He is alert and oriented to person, place, and time.   Skin: Skin is warm. Capillary refill takes less than 2 seconds.   Nursing note and vitals reviewed.    Laboratory  CBC:   Recent Labs   Lab 04/26/19  1325   WBC 4.65   RBC 4.26*   HGB 14.7   HCT 41.1   PLT 58*   MCV 97   MCH 34.5*   MCHC 35.8     BMP:   Recent Labs   Lab 04/26/19  1325   *      K 4.9      CO2 26   BUN 10   CREATININE 1.0   CALCIUM 9.3   MG 2.0     CMP:   Recent Labs   Lab 04/26/19  1325   *   CALCIUM 9.3   ALBUMIN 3.3*   PROT 7.5      K 4.9   CO2 26      BUN 10   CREATININE 1.0   ALKPHOS 121   ALT 63*   *   BILITOT 2.6*     LFTs:   Recent Labs   Lab 04/26/19  1325   ALT 63*   *   ALKPHOS 121   BILITOT 2.6*   PROT 7.5   ALBUMIN 3.3*     Coagulation:   Recent Labs   Lab 04/26/19  1325   LABPROT 11.8   INR 1.2   APTT 25.9     ABGs: No results for input(s): PH, PCO2, PO2, HCO3, POCSATURATED, BE in the last 168 hours.    Diagnostic Results:  Labs: Reviewed  US: Reviewed  CT: Reviewed    ASSESSMENT/PLAN:     * HCC (hepatocellular carcinoma)  52 y/o male with ESLD 2/2 EtOH cirrhosis with HCC s/p TACE who presents for OLT.     - admit to TSU  - pre-op orders and case request placed  - consents signed using   - pre-op labs and imaging reviewed  - no contraindications to proceed with OLT, cut time at 1800 4/26  - NPO    The patient presents for liver transplant.  There are no apparent contraindications to proceeding with the planned transplant.  The patient understands that the transplant could potentially be cancelled pending detailed assessment of the donor organ.     MELD Score: MELD-Na score: 13 at 4/26/2019  1:25 PM  MELD score: 12 at 4/26/2019  1:25 PM  Calculated from:  Serum Creatinine: 1.0 mg/dL at 4/26/2019  1:25 PM  Serum Sodium: 136 mmol/L at 4/26/2019  1:25 PM  Total  Bilirubin: 2.6 mg/dL at 4/26/2019  1:25 PM  INR(ratio): 1.2 at 4/26/2019  1:25 PM  Age: 53 years     He will receive IV steroids pulse induction.     A complete discussion of the transplant procedure, including risks, complications, and alternatives, as well as any donor-specific risk factors requiring specific disclosure, will be carried out by the responsible staff surgeon prior to the procedure.      Discharge Planning:  Ongoing

## 2019-04-26 NOTE — ANESTHESIA PREPROCEDURE EVALUATION
[facility]  Anesthesia Pre-Operative Evaluation         Patient Name: Cesario Bailey  YOB: 1965  MRN: [id number]    SUBJECTIVE:     04/26/2019    Pre-operative evaluation for Procedure(s) (LRB):  TRANSPLANT, LIVER (N/A)    Cesario Bailey is a 53 y.o. male with ESLD (secondary to alcoholic cirrhosis) with portal HTN, esophageal varices, and HCC (s/p Tace 6/11/2018 and 2/4/2019) who is admitted for liver transplant.     Patient now presents for the above procedure(s).      LDA:       Peripheral IV - Single Lumen 04/26/19 1353 18 G Left Forearm (Active)   Number of days: 0       Previous airway:   None documented.      Drips:   dextrose 5 % and 0.45 % NaCl         Patient Active Problem List   Diagnosis    HCC (hepatocellular carcinoma)    Alcoholic cirrhosis of liver without ascites    Organ transplant candidate    Portal hypertension    Thrombocytopenia due to hypersplenism    Lung nodule seen on imaging study    Hepatic encephalopathy    Liver failure       Review of patient's allergies indicates:   Allergen Reactions    Aspirin        Current Inpatient Medications:      Current Facility-Administered Medications on File Prior to Encounter   Medication Dose Route Frequency Provider Last Rate Last Dose    DOXOrubicin with LC beads chemoembolization  76 mg Intra-arterial Once Crystal Gomez DNP        And    DOXOrubicin with LC beads chemoembolization  76 mg Intra-arterial Once Crystal Gomez DNP         Current Outpatient Medications on File Prior to Encounter   Medication Sig Dispense Refill    furosemide (LASIX) 40 MG tablet Take 40 mg by mouth once daily.      insulin glargine (LANTUS) 100 unit/mL injection Inject 5 Units into the skin every evening.      insulin regular 100 unit/mL Inj injection Inject 20 Units into the skin 3 (three) times daily before meals.      lactulose (CHRONULAC) 20 gram/30 mL Soln Take 30 mLs (20 g total) by mouth 2 (two) times  daily. 1000 mL 10    nadolol (CORGARD) 20 MG tablet Take 20 mg by mouth once daily.      rifAXIMin (XIFAXAN) 550 mg Tab Take 550 mg by mouth 2 (two) times daily.      spironolactone (ALDACTONE) 100 MG tablet Take 100 mg by mouth once daily.         Past Surgical History:   Procedure Laterality Date    COLONOSCOPY N/A 7/23/2018    Performed by Romain G[facility]ora MD at [facility] ENDO (4TH FLR)    EMBOLIZATION      ESOPHAGOGASTRODUODENOSCOPY (EGD) N/A 7/23/2018    Performed by Romain G[facility]ora MD at [facility] ENDO (4TH FLR)    HERNIA REPAIR      umbilical hernia repair, large mesh placed.        Social History     Socioeconomic History    Marital status:      Spouse name: Not on file    Number of children: Not on file    Years of education: Not on file    Highest education level: Not on file   Occupational History    Not on file   Social Needs    Financial resource strain: Not on file    Food insecurity:     Worry: Not on file     Inability: Not on file    Transportation needs:     Medical: Not on file     Non-medical: Not on file   Tobacco Use    Smoking status: Never Smoker    Smokeless tobacco: Never Used   Substance and Sexual Activity    Alcohol use: Never     Frequency: Never    Drug use: Never    Sexual activity: Not on file   Lifestyle    Physical activity:     Days per week: Not on file     Minutes per session: Not on file    Stress: Not on file   Relationships    Social connections:     Talks on phone: Not on file     Gets together: Not on file     Attends [employer] service: Not on file     Active member of club or organization: Not on file     Attends meetings of clubs or organizations: Not on file     Relationship status: Not on file   Other Topics Concern    Not on file   Social History Narrative    Not on file       OBJECTIVE:     Vital Signs Range (Last 24H):  Temp:  [37.1 °C (98.7 °F)]   Pulse:  [61]   Resp:  [18]   BP: (132)/(63)   SpO2:  [100 %]       Significant Labs:  Lab Results    Component Value Date    WBC 4.65 04/26/2019    HGB 14.7 04/26/2019    HCT 41.1 04/26/2019    PLT 58 (L) 04/26/2019    ALT 63 (H) 04/26/2019     (H) 04/26/2019     04/26/2019    K 4.9 04/26/2019     04/26/2019    CREATININE 1.0 04/26/2019    BUN 10 04/26/2019    CO2 26 04/26/2019    TSH 1.066 07/16/2018    PSA 0.08 07/16/2018    INR 1.2 04/26/2019    HGBA1C 6.9 (H) 04/20/2019         Diagnostic Studies:  No relevant studies.      Cardiac Studies:  EKG (4/20/2019):   Vent. Rate : 060 BPM     Atrial Rate : 060 BPM     P-R Int : 156 ms          QRS Dur : 092 ms      QT Int : 454 ms       P-R-T Axes : 051 022 054 degrees     QTc Int : 454 ms  Normal sinus rhythm  Normal ECG  When compared with ECG of 02-AUG-2018 10:17,  No significant change was found  Confirmed by Jessica Garcia MD ([id number]) on 4/21/2019 11:54:38 PM    Dobutamine Stress Test w/ Color Flow (7/19/2018):  CONCLUSIONS     1 - Normal left ventricular systolic function (EF 60-65%).     2 - Normal left ventricular diastolic function.     3 - Normal right ventricular systolic function .     4 - The estimated PA systolic pressure is greater than 20 mmHg.     5 - Moderate left atrial enlargement.     6 - No wall motion abnormalities.     7 - Mild left atrial enlargement.   No evidence of stress induced myocardial ischemia.     NM Multi Study RX Stress (8/2/2018):  Normal myocardial perfusion scan.      ASSESSMENT/PLAN:     Anesthesia Evaluation    I have reviewed the Patient Summary Reports.    I have reviewed the Nursing Notes.   I have reviewed the Medications.     Review of Systems  Anesthesia Hx:  Denies Family Hx of Anesthesia complications.   Denies Personal Hx of Anesthesia complications.   Hematology/Oncology:  Hematology Normal   Oncology Normal     EENT/Dental:EENT/Dental Normal   Cardiovascular:  Cardiovascular Normal     Pulmonary:  Pulmonary Normal    Renal/:  Renal/ Normal     Hepatic/GI:   Liver Disease,  Liver Disease,  Alcoholic Liver Disease , Cirrhosis Portal Hypertension, Hepatocellular Carcinoma    Musculoskeletal:  Musculoskeletal Normal    Neurological:  Neurology Normal    Endocrine:  Endocrine Normal    Dermatological:  Skin Normal    Psych:  Psychiatric Normal           Physical Exam  General:  Well nourished    Airway/Jaw/Neck:  Airway Findings: Mouth Opening: Normal Tongue: Normal  General Airway Assessment: Adult  Mallampati: I  TM Distance: Normal, at least 6 cm  Jaw/Neck Findings:  Neck ROM: Normal ROM  Neck Findings: Normal    Eyes/Ears/Nose:  EYES/EARS/NOSE FINDINGS: Normal   Dental:  Dental Findings: In tact   Chest/Lungs:  Chest/Lungs Findings: Clear to auscultation, Normal Respiratory Rate     Heart/Vascular:  Heart Findings: Rate: Normal  Rhythm: Regular Rhythm        Mental Status:  Mental Status Findings: Normal        Anesthesia Plan  Type of Anesthesia, risks & benefits discussed:  Anesthesia Type:  general  Patient's Preference:   Intra-op Monitoring Plan: standard ASA monitors, arterial line, central line, [place] and cardiac output  Intra-op Monitoring Plan Comments:   Post Op Pain Control Plan: multimodal analgesia, IV/PO Opioids PRN and per primary service following discharge from PACU  Post Op Pain Control Plan Comments:   Induction:   IV  Beta Blocker:  Patient is on a Beta-Blocker and has received one dose within the past 24 hours (No further documentation required).       Informed Consent: Patient understands risks and agrees with Anesthesia plan.  Questions answered. Anesthesia consent signed with patient.  ASA Score: 4     Day of Surgery Review of History & Physical:    H&P update referred to the surgeon.     Anesthesia Plan Notes: Informed consent was obtained through official . Discussed GETA, 2 A-lines, 2 CVCs, and post-op ICU care        Ready For Surgery From Anesthesia Perspective.

## 2019-04-26 NOTE — ANESTHESIA PROCEDURE NOTES
Arterial    Diagnosis: ESLD    Patient location during procedure: done in OR  Procedure start time: 4/26/2019 5:44 PM  Timeout: 4/26/2019 5:42 PM  Procedure end time: 4/26/2019 5:44 PM  Staffing  Anesthesiologist: Dominique Velazquez MD  Resident/CRNA: Chelo Perez CRNA  Performed: resident/CRNA   Anesthesiologist was present at the time of the procedure.  Preanesthetic Checklist  Completed: patient identified, site marked, surgical consent, pre-op evaluation, timeout performed, IV checked, risks and benefits discussed, monitors and equipment checked and anesthesia consent givenArterial  Skin Prep: chlorhexidine gluconate  Local Infiltration: none  Orientation: left  Location: radial  Catheter Size: 20 G  Catheter placement by Anatomical landmarks. Heme positive aspiration all ports.Insertion Attempts: 1  Assessment  Dressing: secured with tape and tegaderm  Patient: Tolerated well

## 2019-04-26 NOTE — HPI
53 year old male with pmhx of etoh cirrhosis, portal HTN, esophageal varices, and ESLD who presented for liver transplant.      Clinical Summary: Patient was diagnosed with alcohol-induced cirrhosis about 5 years ago. He stopped drinking since. He developed ascites, that resolved later, required paracentesis x 1. Also thoracentesis x 2 three years ago. He had an episode variceal bleed in 2016 - EVL done. Has been on nadolol since. Liver decompensations have been controlled but developed HCC on surveillance screening - 2.1 cm with HCC features.      HCC Treatment History: Tace 6/11/2018, Tace 2/4/2019. Last CT A/P shows no evidence of residual dx or recurrence but does show a partial PVT extending to the SMV, unchanged since 1/2019.         MELD Score: MELD-Na score: 13 at 4/26/2019  1:25 PM  MELD score: 12 at 4/26/2019  1:25 PM  Calculated from:  Serum Creatinine: 1.0 mg/dL at 4/26/2019  1:25 PM  Serum Sodium: 136 mmol/L at 4/26/2019  1:25 PM  Total Bilirubin: 2.6 mg/dL at 4/26/2019  1:25 PM  INR(ratio): 1.2 at 4/26/2019  1:25 PM  Age: 53 years    He is admitted to SICU following liver transplantation.  He is brought up intubated without any pressor requirements.  He was given 1 u pRBC, 4 of FFP, 600 ml of cellsaver.

## 2019-04-26 NOTE — TELEPHONE ENCOUNTER
LATE ENTRY: 0730  ORGAN OFFER NOTE    Notified by Earl Blood, , of liver offer with donor information.  Donor and recipient information read back and verified.  With assistance of Star[employer], [employer], spoke with Cesario Bailey and identified no acute medical issues during telephone assessment.  Patient instructed NPO after 0900. Patient to remain at the [facility] until notified to report to the admit office.  Patient verbalized understanding and willingness to come in for transplant.  ETA: to be determined.

## 2019-04-27 PROBLEM — Z94.4 LIVER TRANSPLANTED: Status: ACTIVE | Noted: 2019-04-27

## 2019-04-27 PROBLEM — T38.0X5A ADVERSE EFFECT OF CORTICOSTEROIDS: Status: ACTIVE | Noted: 2019-04-27

## 2019-04-27 PROBLEM — E11.65 TYPE 2 DIABETES MELLITUS WITH HYPERGLYCEMIA: Status: ACTIVE | Noted: 2019-04-27

## 2019-04-27 PROBLEM — E66.09 CLASS 1 OBESITY DUE TO EXCESS CALORIES IN ADULT: Status: ACTIVE | Noted: 2019-04-27

## 2019-04-27 LAB
25(OH)D3+25(OH)D2 SERPL-MCNC: 12 NG/ML (ref 30–96)
ALBUMIN SERPL BCP-MCNC: 2.4 G/DL (ref 3.5–5.2)
ALBUMIN SERPL BCP-MCNC: 2.4 G/DL (ref 3.5–5.2)
ALLENS TEST: ABNORMAL
ALP SERPL-CCNC: 75 U/L (ref 55–135)
ALP SERPL-CCNC: 82 U/L (ref 55–135)
ALT SERPL W/O P-5'-P-CCNC: 2224 U/L (ref 10–44)
ALT SERPL W/O P-5'-P-CCNC: 2292 U/L (ref 10–44)
AMYLASE SERPL-CCNC: 42 U/L (ref 20–110)
ANION GAP SERPL CALC-SCNC: 10 MMOL/L (ref 8–16)
ANION GAP SERPL CALC-SCNC: 9 MMOL/L (ref 8–16)
ANISOCYTOSIS BLD QL SMEAR: SLIGHT
APTT BLDCRRT: 28.3 SEC (ref 21–32)
APTT BLDCRRT: 32.9 SEC (ref 21–32)
AST SERPL-CCNC: 2061 U/L (ref 10–40)
AST SERPL-CCNC: 3201 U/L (ref 10–40)
AST SERPL-CCNC: 3942 U/L (ref 10–40)
AST SERPL-CCNC: 5559 U/L (ref 10–40)
AST SERPL-CCNC: 9143 U/L (ref 10–40)
AST SERPL-CCNC: ABNORMAL U/L (ref 10–40)
AST SERPL-CCNC: ABNORMAL U/L (ref 10–40)
BACTERIA UR CULT: NO GROWTH
BASOPHILS # BLD AUTO: 0 K/UL (ref 0–0.2)
BASOPHILS # BLD AUTO: 0.01 K/UL (ref 0–0.2)
BASOPHILS # BLD AUTO: 0.02 K/UL (ref 0–0.2)
BASOPHILS # BLD AUTO: 0.02 K/UL (ref 0–0.2)
BASOPHILS NFR BLD: 0 % (ref 0–1.9)
BASOPHILS NFR BLD: 0.1 % (ref 0–1.9)
BASOPHILS NFR BLD: 0.2 % (ref 0–1.9)
BASOPHILS NFR BLD: 0.2 % (ref 0–1.9)
BASOPHILS NFR BLD: 0.3 % (ref 0–1.9)
BILIRUB DIRECT SERPL-MCNC: 2.2 MG/DL (ref 0.1–0.3)
BILIRUB SERPL-MCNC: 2.5 MG/DL (ref 0.1–1)
BILIRUB SERPL-MCNC: 3.1 MG/DL (ref 0.1–1)
BLD PROD TYP BPU: NORMAL
BLOOD UNIT EXPIRATION DATE: NORMAL
BLOOD UNIT TYPE CODE: 600
BLOOD UNIT TYPE CODE: 6200
BLOOD UNIT TYPE: NORMAL
BUN SERPL-MCNC: 16 MG/DL (ref 6–20)
BUN SERPL-MCNC: 18 MG/DL (ref 6–20)
BUN SERPL-MCNC: 21 MG/DL (ref 6–20)
BUN SERPL-MCNC: 23 MG/DL (ref 6–20)
CALCIUM SERPL-MCNC: 8.2 MG/DL (ref 8.7–10.5)
CALCIUM SERPL-MCNC: 8.3 MG/DL (ref 8.7–10.5)
CALCIUM SERPL-MCNC: 8.5 MG/DL (ref 8.7–10.5)
CALCIUM SERPL-MCNC: 8.9 MG/DL (ref 8.7–10.5)
CHLORIDE SERPL-SCNC: 104 MMOL/L (ref 95–110)
CHLORIDE SERPL-SCNC: 107 MMOL/L (ref 95–110)
CHLORIDE SERPL-SCNC: 108 MMOL/L (ref 95–110)
CHLORIDE SERPL-SCNC: 109 MMOL/L (ref 95–110)
CO2 SERPL-SCNC: 21 MMOL/L (ref 23–29)
CO2 SERPL-SCNC: 24 MMOL/L (ref 23–29)
CODING SYSTEM: NORMAL
CREAT SERPL-MCNC: 0.9 MG/DL (ref 0.5–1.4)
CREAT SERPL-MCNC: 1 MG/DL (ref 0.5–1.4)
CREAT SERPL-MCNC: 1 MG/DL (ref 0.5–1.4)
CREAT SERPL-MCNC: 1.1 MG/DL (ref 0.5–1.4)
DELSYS: ABNORMAL
DIFFERENTIAL METHOD: ABNORMAL
DISPENSE STATUS: NORMAL
DOHLE BOD BLD QL SMEAR: PRESENT
EOSINOPHIL # BLD AUTO: 0 K/UL (ref 0–0.5)
EOSINOPHIL # BLD AUTO: 0.3 K/UL (ref 0–0.5)
EOSINOPHIL NFR BLD: 0 % (ref 0–8)
EOSINOPHIL NFR BLD: 0.2 % (ref 0–8)
EOSINOPHIL NFR BLD: 0.4 % (ref 0–8)
EOSINOPHIL NFR BLD: 0.5 % (ref 0–8)
EOSINOPHIL NFR BLD: 4.4 % (ref 0–8)
ERYTHROCYTE [DISTWIDTH] IN BLOOD BY AUTOMATED COUNT: 14.2 % (ref 11.5–14.5)
ERYTHROCYTE [DISTWIDTH] IN BLOOD BY AUTOMATED COUNT: 14.6 % (ref 11.5–14.5)
ERYTHROCYTE [DISTWIDTH] IN BLOOD BY AUTOMATED COUNT: 14.7 % (ref 11.5–14.5)
ERYTHROCYTE [DISTWIDTH] IN BLOOD BY AUTOMATED COUNT: 14.8 % (ref 11.5–14.5)
ERYTHROCYTE [DISTWIDTH] IN BLOOD BY AUTOMATED COUNT: 14.9 % (ref 11.5–14.5)
ERYTHROCYTE [SEDIMENTATION RATE] IN BLOOD BY WESTERGREN METHOD: 16 MM/H
EST. GFR  (AFRICAN AMERICAN): >60 ML/MIN/1.73 M^2
EST. GFR  (NON AFRICAN AMERICAN): >60 ML/MIN/1.73 M^2
FIBRINOGEN PPP-MCNC: 197 MG/DL (ref 182–366)
FIO2: 100
FIO2: 40
FIO2: 50
GGT SERPL-CCNC: 327 U/L (ref 8–55)
GIANT PLATELETS BLD QL SMEAR: PRESENT
GLUCOSE SERPL-MCNC: 157 MG/DL (ref 70–110)
GLUCOSE SERPL-MCNC: 160 MG/DL (ref 70–110)
GLUCOSE SERPL-MCNC: 163 MG/DL (ref 70–110)
GLUCOSE SERPL-MCNC: 173 MG/DL (ref 70–110)
GLUCOSE SERPL-MCNC: 231 MG/DL (ref 70–110)
GLUCOSE SERPL-MCNC: 248 MG/DL (ref 70–110)
GLUCOSE SERPL-MCNC: 269 MG/DL (ref 70–110)
GLUCOSE SERPL-MCNC: 278 MG/DL (ref 70–110)
GLUCOSE SERPL-MCNC: 279 MG/DL (ref 70–110)
GLUCOSE SERPL-MCNC: 289 MG/DL (ref 70–110)
HCO3 UR-SCNC: 16 MMOL/L (ref 24–28)
HCO3 UR-SCNC: 21.7 MMOL/L (ref 24–28)
HCO3 UR-SCNC: 22.8 MMOL/L (ref 24–28)
HCO3 UR-SCNC: 23 MMOL/L (ref 24–28)
HCO3 UR-SCNC: 23.1 MMOL/L (ref 24–28)
HCO3 UR-SCNC: 23.6 MMOL/L (ref 24–28)
HCO3 UR-SCNC: 24.9 MMOL/L (ref 24–28)
HCO3 UR-SCNC: 25.3 MMOL/L (ref 24–28)
HCO3 UR-SCNC: 26.9 MMOL/L (ref 24–28)
HCT VFR BLD AUTO: 24.4 % (ref 40–54)
HCT VFR BLD AUTO: 24.5 % (ref 40–54)
HCT VFR BLD AUTO: 25.9 % (ref 40–54)
HCT VFR BLD AUTO: 26.4 % (ref 40–54)
HCT VFR BLD AUTO: 27 % (ref 40–54)
HCT VFR BLD AUTO: 27.5 % (ref 40–54)
HCT VFR BLD AUTO: 27.5 % (ref 40–54)
HCT VFR BLD AUTO: 28.1 % (ref 40–54)
HCT VFR BLD CALC: 22 %PCV (ref 36–54)
HCT VFR BLD CALC: 24 %PCV (ref 36–54)
HCT VFR BLD CALC: 25 %PCV (ref 36–54)
HCT VFR BLD CALC: 27 %PCV (ref 36–54)
HCT VFR BLD CALC: 29 %PCV (ref 36–54)
HCT VFR BLD CALC: 31 %PCV (ref 36–54)
HCT VFR BLD CALC: 33 %PCV (ref 36–54)
HGB BLD-MCNC: 10 G/DL (ref 14–18)
HGB BLD-MCNC: 8.5 G/DL (ref 14–18)
HGB BLD-MCNC: 8.7 G/DL (ref 14–18)
HGB BLD-MCNC: 9.2 G/DL (ref 14–18)
HGB BLD-MCNC: 9.7 G/DL (ref 14–18)
HGB BLD-MCNC: 9.8 G/DL (ref 14–18)
IMM GRANULOCYTES # BLD AUTO: 0.01 K/UL (ref 0–0.04)
IMM GRANULOCYTES # BLD AUTO: 0.02 K/UL (ref 0–0.04)
IMM GRANULOCYTES # BLD AUTO: 0.03 K/UL (ref 0–0.04)
IMM GRANULOCYTES # BLD AUTO: 0.04 K/UL (ref 0–0.04)
IMM GRANULOCYTES # BLD AUTO: 0.05 K/UL (ref 0–0.04)
IMM GRANULOCYTES # BLD AUTO: 0.05 K/UL (ref 0–0.04)
IMM GRANULOCYTES NFR BLD AUTO: 0.2 % (ref 0–0.5)
IMM GRANULOCYTES NFR BLD AUTO: 0.4 % (ref 0–0.5)
IMM GRANULOCYTES NFR BLD AUTO: 0.5 % (ref 0–0.5)
IMM GRANULOCYTES NFR BLD AUTO: 0.5 % (ref 0–0.5)
IMM GRANULOCYTES NFR BLD AUTO: 0.6 % (ref 0–0.5)
INR PPP: 1.2 (ref 0.8–1.2)
INR PPP: 1.2 (ref 0.8–1.2)
INR PPP: 1.3 (ref 0.8–1.2)
INR PPP: 1.3 (ref 0.8–1.2)
INR PPP: 1.4 (ref 0.8–1.2)
INR PPP: 1.5 (ref 0.8–1.2)
INR PPP: 1.5 (ref 0.8–1.2)
LDH SERPL L TO P-CCNC: 6778 U/L (ref 110–260)
LYMPHOCYTES # BLD AUTO: 0.2 K/UL (ref 1–4.8)
LYMPHOCYTES # BLD AUTO: 0.3 K/UL (ref 1–4.8)
LYMPHOCYTES NFR BLD: 3 % (ref 18–48)
LYMPHOCYTES NFR BLD: 3.2 % (ref 18–48)
LYMPHOCYTES NFR BLD: 3.2 % (ref 18–48)
LYMPHOCYTES NFR BLD: 3.3 % (ref 18–48)
LYMPHOCYTES NFR BLD: 3.5 % (ref 18–48)
LYMPHOCYTES NFR BLD: 4 % (ref 18–48)
LYMPHOCYTES NFR BLD: 4.2 % (ref 18–48)
LYMPHOCYTES NFR BLD: 6.3 % (ref 18–48)
MAGNESIUM SERPL-MCNC: 1.7 MG/DL (ref 1.6–2.6)
MAGNESIUM SERPL-MCNC: 2 MG/DL (ref 1.6–2.6)
MCH RBC QN AUTO: 32.2 PG (ref 27–31)
MCH RBC QN AUTO: 32.4 PG (ref 27–31)
MCH RBC QN AUTO: 32.8 PG (ref 27–31)
MCH RBC QN AUTO: 33.1 PG (ref 27–31)
MCH RBC QN AUTO: 33.2 PG (ref 27–31)
MCH RBC QN AUTO: 33.2 PG (ref 27–31)
MCH RBC QN AUTO: 33.4 PG (ref 27–31)
MCH RBC QN AUTO: 33.5 PG (ref 27–31)
MCHC RBC AUTO-ENTMCNC: 34.7 G/DL (ref 32–36)
MCHC RBC AUTO-ENTMCNC: 35.5 G/DL (ref 32–36)
MCHC RBC AUTO-ENTMCNC: 35.6 G/DL (ref 32–36)
MCHC RBC AUTO-ENTMCNC: 35.7 G/DL (ref 32–36)
MCHC RBC AUTO-ENTMCNC: 36.3 G/DL (ref 32–36)
MCHC RBC AUTO-ENTMCNC: 36.4 G/DL (ref 32–36)
MCHC RBC AUTO-ENTMCNC: 36.4 G/DL (ref 32–36)
MCHC RBC AUTO-ENTMCNC: 36.7 G/DL (ref 32–36)
MCV RBC AUTO: 89 FL (ref 82–98)
MCV RBC AUTO: 90 FL (ref 82–98)
MCV RBC AUTO: 91 FL (ref 82–98)
MCV RBC AUTO: 94 FL (ref 82–98)
MIN VOL: 8.4
MODE: ABNORMAL
MONOCYTES # BLD AUTO: 0.2 K/UL (ref 0.3–1)
MONOCYTES # BLD AUTO: 0.3 K/UL (ref 0.3–1)
MONOCYTES # BLD AUTO: 0.3 K/UL (ref 0.3–1)
MONOCYTES # BLD AUTO: 0.4 K/UL (ref 0.3–1)
MONOCYTES # BLD AUTO: 0.4 K/UL (ref 0.3–1)
MONOCYTES # BLD AUTO: 0.5 K/UL (ref 0.3–1)
MONOCYTES NFR BLD: 2.3 % (ref 4–15)
MONOCYTES NFR BLD: 3.2 % (ref 4–15)
MONOCYTES NFR BLD: 3.5 % (ref 4–15)
MONOCYTES NFR BLD: 3.6 % (ref 4–15)
MONOCYTES NFR BLD: 3.6 % (ref 4–15)
MONOCYTES NFR BLD: 3.8 % (ref 4–15)
MONOCYTES NFR BLD: 3.8 % (ref 4–15)
MONOCYTES NFR BLD: 9.8 % (ref 4–15)
NEUTROPHILS # BLD AUTO: 3.8 K/UL (ref 1.8–7.7)
NEUTROPHILS # BLD AUTO: 5.9 K/UL (ref 1.8–7.7)
NEUTROPHILS # BLD AUTO: 5.9 K/UL (ref 1.8–7.7)
NEUTROPHILS # BLD AUTO: 6.6 K/UL (ref 1.8–7.7)
NEUTROPHILS # BLD AUTO: 6.9 K/UL (ref 1.8–7.7)
NEUTROPHILS # BLD AUTO: 7.3 K/UL (ref 1.8–7.7)
NEUTROPHILS # BLD AUTO: 8.4 K/UL (ref 1.8–7.7)
NEUTROPHILS # BLD AUTO: 8.4 K/UL (ref 1.8–7.7)
NEUTROPHILS NFR BLD: 82.9 % (ref 38–73)
NEUTROPHILS NFR BLD: 88 % (ref 38–73)
NEUTROPHILS NFR BLD: 91.1 % (ref 38–73)
NEUTROPHILS NFR BLD: 92 % (ref 38–73)
NEUTROPHILS NFR BLD: 92.4 % (ref 38–73)
NEUTROPHILS NFR BLD: 92.4 % (ref 38–73)
NEUTROPHILS NFR BLD: 92.7 % (ref 38–73)
NEUTROPHILS NFR BLD: 94.2 % (ref 38–73)
NRBC BLD-RTO: 0 /100 WBC
NUM UNITS TRANS FFP: NORMAL
PCO2 BLDA: 30.9 MMHG (ref 35–45)
PCO2 BLDA: 32.2 MMHG (ref 35–45)
PCO2 BLDA: 34.5 MMHG (ref 35–45)
PCO2 BLDA: 35.5 MMHG (ref 35–45)
PCO2 BLDA: 35.6 MMHG (ref 35–45)
PCO2 BLDA: 38.8 MMHG (ref 35–45)
PCO2 BLDA: 39.5 MMHG (ref 35–45)
PCO2 BLDA: 41.8 MMHG (ref 35–45)
PCO2 BLDA: 44.1 MMHG (ref 35–45)
PEEP: 5
PH SMN: 7.3 [PH] (ref 7.35–7.45)
PH SMN: 7.34 [PH] (ref 7.35–7.45)
PH SMN: 7.38 [PH] (ref 7.35–7.45)
PH SMN: 7.42 [PH] (ref 7.35–7.45)
PH SMN: 7.45 [PH] (ref 7.35–7.45)
PH SMN: 7.47 [PH] (ref 7.35–7.45)
PHOSPHATE SERPL-MCNC: 2.9 MG/DL (ref 2.7–4.5)
PHOSPHATE SERPL-MCNC: 4.8 MG/DL (ref 2.7–4.5)
PIP: 19
PIP: 24
PLATELET # BLD AUTO: 46 K/UL (ref 150–350)
PLATELET # BLD AUTO: 46 K/UL (ref 150–350)
PLATELET # BLD AUTO: 61 K/UL (ref 150–350)
PLATELET # BLD AUTO: 61 K/UL (ref 150–350)
PLATELET # BLD AUTO: 63 K/UL (ref 150–350)
PLATELET # BLD AUTO: 63 K/UL (ref 150–350)
PLATELET # BLD AUTO: 93 K/UL (ref 150–350)
PLATELET # BLD AUTO: 98 K/UL (ref 150–350)
PLATELET BLD QL SMEAR: ABNORMAL
PMV BLD AUTO: 10.3 FL (ref 9.2–12.9)
PMV BLD AUTO: 10.3 FL (ref 9.2–12.9)
PMV BLD AUTO: 10.4 FL (ref 9.2–12.9)
PMV BLD AUTO: 10.4 FL (ref 9.2–12.9)
PMV BLD AUTO: 10.5 FL (ref 9.2–12.9)
PMV BLD AUTO: 10.8 FL (ref 9.2–12.9)
PO2 BLDA: 101 MMHG (ref 80–100)
PO2 BLDA: 148 MMHG (ref 80–100)
PO2 BLDA: 168 MMHG (ref 80–100)
PO2 BLDA: 256 MMHG (ref 80–100)
PO2 BLDA: 379 MMHG (ref 80–100)
PO2 BLDA: 409 MMHG (ref 80–100)
PO2 BLDA: 45 MMHG (ref 40–60)
PO2 BLDA: 485 MMHG (ref 80–100)
PO2 BLDA: 526 MMHG (ref 80–100)
POC BE: -10 MMOL/L
POC BE: -2 MMOL/L
POC BE: 1 MMOL/L
POC BE: 1 MMOL/L
POC BE: 2 MMOL/L
POC IONIZED CALCIUM: 1.02 MMOL/L (ref 1.06–1.42)
POC IONIZED CALCIUM: 1.05 MMOL/L (ref 1.06–1.42)
POC IONIZED CALCIUM: 1.09 MMOL/L (ref 1.06–1.42)
POC IONIZED CALCIUM: 1.16 MMOL/L (ref 1.06–1.42)
POC IONIZED CALCIUM: 1.21 MMOL/L (ref 1.06–1.42)
POC IONIZED CALCIUM: 1.26 MMOL/L (ref 1.06–1.42)
POC IONIZED CALCIUM: 1.31 MMOL/L (ref 1.06–1.42)
POC SATURATED O2: 100 % (ref 95–100)
POC SATURATED O2: 77 % (ref 95–100)
POC SATURATED O2: 98 % (ref 95–100)
POC SATURATED O2: 99 % (ref 95–100)
POC TCO2: 17 MMOL/L (ref 24–29)
POC TCO2: 23 MMOL/L (ref 23–27)
POC TCO2: 24 MMOL/L (ref 23–27)
POC TCO2: 25 MMOL/L (ref 23–27)
POC TCO2: 26 MMOL/L (ref 23–27)
POC TCO2: 26 MMOL/L (ref 23–27)
POC TCO2: 28 MMOL/L (ref 23–27)
POCT GLUCOSE: 136 MG/DL (ref 70–110)
POCT GLUCOSE: 141 MG/DL (ref 70–110)
POCT GLUCOSE: 147 MG/DL (ref 70–110)
POCT GLUCOSE: 157 MG/DL (ref 70–110)
POCT GLUCOSE: 162 MG/DL (ref 70–110)
POCT GLUCOSE: 181 MG/DL (ref 70–110)
POCT GLUCOSE: 184 MG/DL (ref 70–110)
POCT GLUCOSE: 186 MG/DL (ref 70–110)
POCT GLUCOSE: 186 MG/DL (ref 70–110)
POCT GLUCOSE: 188 MG/DL (ref 70–110)
POCT GLUCOSE: 197 MG/DL (ref 70–110)
POCT GLUCOSE: 198 MG/DL (ref 70–110)
POCT GLUCOSE: 217 MG/DL (ref 70–110)
POCT GLUCOSE: 233 MG/DL (ref 70–110)
POCT GLUCOSE: 245 MG/DL (ref 70–110)
POCT GLUCOSE: 255 MG/DL (ref 70–110)
POCT GLUCOSE: 264 MG/DL (ref 70–110)
POCT GLUCOSE: 283 MG/DL (ref 70–110)
POCT GLUCOSE: 289 MG/DL (ref 70–110)
POCT GLUCOSE: 298 MG/DL (ref 70–110)
POIKILOCYTOSIS BLD QL SMEAR: ABNORMAL
POLYCHROMASIA BLD QL SMEAR: ABNORMAL
POTASSIUM BLD-SCNC: 3 MMOL/L (ref 3.5–5.1)
POTASSIUM BLD-SCNC: 3.1 MMOL/L (ref 3.5–5.1)
POTASSIUM BLD-SCNC: 3.5 MMOL/L (ref 3.5–5.1)
POTASSIUM BLD-SCNC: 3.7 MMOL/L (ref 3.5–5.1)
POTASSIUM BLD-SCNC: 3.9 MMOL/L (ref 3.5–5.1)
POTASSIUM BLD-SCNC: 4.9 MMOL/L (ref 3.5–5.1)
POTASSIUM BLD-SCNC: 6.2 MMOL/L (ref 3.5–5.1)
POTASSIUM SERPL-SCNC: 3.1 MMOL/L (ref 3.5–5.1)
POTASSIUM SERPL-SCNC: 3.7 MMOL/L (ref 3.5–5.1)
POTASSIUM SERPL-SCNC: 4 MMOL/L (ref 3.5–5.1)
POTASSIUM SERPL-SCNC: 4.3 MMOL/L (ref 3.5–5.1)
PROT SERPL-MCNC: 4.6 G/DL (ref 6–8.4)
PROT SERPL-MCNC: 4.6 G/DL (ref 6–8.4)
PROTHROMBIN TIME: 12.4 SEC (ref 9–12.5)
PROTHROMBIN TIME: 12.4 SEC (ref 9–12.5)
PROTHROMBIN TIME: 12.9 SEC (ref 9–12.5)
PROTHROMBIN TIME: 13.3 SEC (ref 9–12.5)
PROTHROMBIN TIME: 13.5 SEC (ref 9–12.5)
PROTHROMBIN TIME: 14.5 SEC (ref 9–12.5)
PROTHROMBIN TIME: 14.8 SEC (ref 9–12.5)
PS: 10
PS: 10
RBC # BLD AUTO: 2.62 M/UL (ref 4.6–6.2)
RBC # BLD AUTO: 2.7 M/UL (ref 4.6–6.2)
RBC # BLD AUTO: 2.75 M/UL (ref 4.6–6.2)
RBC # BLD AUTO: 2.96 M/UL (ref 4.6–6.2)
RBC # BLD AUTO: 2.96 M/UL (ref 4.6–6.2)
RBC # BLD AUTO: 2.99 M/UL (ref 4.6–6.2)
RBC # BLD AUTO: 3.01 M/UL (ref 4.6–6.2)
RBC # BLD AUTO: 3.01 M/UL (ref 4.6–6.2)
SAMPLE: ABNORMAL
SITE: ABNORMAL
SODIUM BLD-SCNC: 133 MMOL/L (ref 136–145)
SODIUM BLD-SCNC: 134 MMOL/L (ref 136–145)
SODIUM BLD-SCNC: 136 MMOL/L (ref 136–145)
SODIUM BLD-SCNC: 137 MMOL/L (ref 136–145)
SODIUM BLD-SCNC: 138 MMOL/L (ref 136–145)
SODIUM BLD-SCNC: 139 MMOL/L (ref 136–145)
SODIUM BLD-SCNC: 140 MMOL/L (ref 136–145)
SODIUM SERPL-SCNC: 138 MMOL/L (ref 136–145)
SODIUM SERPL-SCNC: 140 MMOL/L (ref 136–145)
SP02: 100
TACROLIMUS BLD-MCNC: <1.5 NG/ML (ref 5–15)
TOXIC GRANULES BLD QL SMEAR: PRESENT
VT: 500
WBC # BLD AUTO: 4.6 K/UL (ref 3.9–12.7)
WBC # BLD AUTO: 6.37 K/UL (ref 3.9–12.7)
WBC # BLD AUTO: 6.65 K/UL (ref 3.9–12.7)
WBC # BLD AUTO: 7.18 K/UL (ref 3.9–12.7)
WBC # BLD AUTO: 7.3 K/UL (ref 3.9–12.7)
WBC # BLD AUTO: 8.01 K/UL (ref 3.9–12.7)
WBC # BLD AUTO: 9.1 K/UL (ref 3.9–12.7)
WBC # BLD AUTO: 9.1 K/UL (ref 3.9–12.7)

## 2019-04-27 PROCEDURE — 82306 VITAMIN D 25 HYDROXY: CPT

## 2019-04-27 PROCEDURE — 37799 UNLISTED PX VASCULAR SURGERY: CPT

## 2019-04-27 PROCEDURE — 94002 VENT MGMT INPAT INIT DAY: CPT

## 2019-04-27 PROCEDURE — 84295 ASSAY OF SERUM SODIUM: CPT

## 2019-04-27 PROCEDURE — 84100 ASSAY OF PHOSPHORUS: CPT

## 2019-04-27 PROCEDURE — 99223 1ST HOSP IP/OBS HIGH 75: CPT | Mod: ,,, | Performed by: NURSE PRACTITIONER

## 2019-04-27 PROCEDURE — 99900017 HC EXTUBATION W/PARAMETERS (STAT)

## 2019-04-27 PROCEDURE — 27000221 HC OXYGEN, UP TO 24 HOURS

## 2019-04-27 PROCEDURE — 85025 COMPLETE CBC W/AUTO DIFF WBC: CPT | Mod: 91

## 2019-04-27 PROCEDURE — 63600175 PHARM REV CODE 636 W HCPCS: Mod: JG | Performed by: STUDENT IN AN ORGANIZED HEALTH CARE EDUCATION/TRAINING PROGRAM

## 2019-04-27 PROCEDURE — 25000003 PHARM REV CODE 250: Performed by: STUDENT IN AN ORGANIZED HEALTH CARE EDUCATION/TRAINING PROGRAM

## 2019-04-27 PROCEDURE — 99000 SPECIMEN HANDLING OFFICE-LAB: CPT

## 2019-04-27 PROCEDURE — 63600175 PHARM REV CODE 636 W HCPCS: Performed by: STUDENT IN AN ORGANIZED HEALTH CARE EDUCATION/TRAINING PROGRAM

## 2019-04-27 PROCEDURE — 84132 ASSAY OF SERUM POTASSIUM: CPT

## 2019-04-27 PROCEDURE — 82977 ASSAY OF GGT: CPT

## 2019-04-27 PROCEDURE — 80048 BASIC METABOLIC PNL TOTAL CA: CPT | Mod: 91

## 2019-04-27 PROCEDURE — 63600175 PHARM REV CODE 636 W HCPCS: Performed by: NURSE PRACTITIONER

## 2019-04-27 PROCEDURE — 25000003 PHARM REV CODE 250: Performed by: SURGERY

## 2019-04-27 PROCEDURE — 85610 PROTHROMBIN TIME: CPT | Mod: 91

## 2019-04-27 PROCEDURE — 85014 HEMATOCRIT: CPT

## 2019-04-27 PROCEDURE — 80053 COMPREHEN METABOLIC PANEL: CPT | Mod: 91

## 2019-04-27 PROCEDURE — 80197 ASSAY OF TACROLIMUS: CPT

## 2019-04-27 PROCEDURE — 25000003 PHARM REV CODE 250: Performed by: NURSE PRACTITIONER

## 2019-04-27 PROCEDURE — 84450 TRANSFERASE (AST) (SGOT): CPT

## 2019-04-27 PROCEDURE — 63600175 PHARM REV CODE 636 W HCPCS: Performed by: SURGERY

## 2019-04-27 PROCEDURE — 94150 VITAL CAPACITY TEST: CPT

## 2019-04-27 PROCEDURE — 80053 COMPREHEN METABOLIC PANEL: CPT

## 2019-04-27 PROCEDURE — S0028 INJECTION, FAMOTIDINE, 20 MG: HCPCS | Performed by: TRANSPLANT SURGERY

## 2019-04-27 PROCEDURE — 82150 ASSAY OF AMYLASE: CPT

## 2019-04-27 PROCEDURE — P9045 ALBUMIN (HUMAN), 5%, 250 ML: HCPCS | Mod: JG | Performed by: STUDENT IN AN ORGANIZED HEALTH CARE EDUCATION/TRAINING PROGRAM

## 2019-04-27 PROCEDURE — 85730 THROMBOPLASTIN TIME PARTIAL: CPT

## 2019-04-27 PROCEDURE — 82803 BLOOD GASES ANY COMBINATION: CPT

## 2019-04-27 PROCEDURE — 94761 N-INVAS EAR/PLS OXIMETRY MLT: CPT

## 2019-04-27 PROCEDURE — 83615 LACTATE (LD) (LDH) ENZYME: CPT

## 2019-04-27 PROCEDURE — 94799 UNLISTED PULMONARY SVC/PX: CPT

## 2019-04-27 PROCEDURE — 99232 SBSQ HOSP IP/OBS MODERATE 35: CPT | Mod: 24,,, | Performed by: SURGERY

## 2019-04-27 PROCEDURE — S5010 5% DEXTROSE AND 0.45% SALINE: HCPCS | Performed by: TRANSPLANT SURGERY

## 2019-04-27 PROCEDURE — 82330 ASSAY OF CALCIUM: CPT

## 2019-04-27 PROCEDURE — 25000003 PHARM REV CODE 250: Performed by: TRANSPLANT SURGERY

## 2019-04-27 PROCEDURE — 20000000 HC ICU ROOM

## 2019-04-27 PROCEDURE — 94010 BREATHING CAPACITY TEST: CPT

## 2019-04-27 PROCEDURE — 82248 BILIRUBIN DIRECT: CPT

## 2019-04-27 PROCEDURE — 83735 ASSAY OF MAGNESIUM: CPT | Mod: 91

## 2019-04-27 PROCEDURE — 63600175 PHARM REV CODE 636 W HCPCS: Performed by: TRANSPLANT SURGERY

## 2019-04-27 PROCEDURE — 99223 PR INITIAL HOSPITAL CARE,LEVL III: ICD-10-PCS | Mod: ,,, | Performed by: NURSE PRACTITIONER

## 2019-04-27 PROCEDURE — 85384 FIBRINOGEN ACTIVITY: CPT

## 2019-04-27 PROCEDURE — 99900035 HC TECH TIME PER 15 MIN (STAT)

## 2019-04-27 PROCEDURE — 85730 THROMBOPLASTIN TIME PARTIAL: CPT | Mod: 91

## 2019-04-27 PROCEDURE — 99900026 HC AIRWAY MAINTENANCE (STAT)

## 2019-04-27 PROCEDURE — 99232 PR SUBSEQUENT HOSPITAL CARE,LEVL II: ICD-10-PCS | Mod: 24,,, | Performed by: SURGERY

## 2019-04-27 RX ORDER — METHYLPREDNISOLONE SOD SUCC 125 MG
80 VIAL (EA) INJECTION EVERY 12 HOURS
Status: COMPLETED | OUTPATIENT
Start: 2019-04-28 | End: 2019-04-28

## 2019-04-27 RX ORDER — HEPARIN SODIUM 5000 [USP'U]/ML
5000 INJECTION, SOLUTION INTRAVENOUS; SUBCUTANEOUS EVERY 8 HOURS
Status: DISCONTINUED | OUTPATIENT
Start: 2019-04-27 | End: 2019-05-04 | Stop reason: HOSPADM

## 2019-04-27 RX ORDER — POTASSIUM CHLORIDE 14.9 MG/ML
20 INJECTION INTRAVENOUS
Status: COMPLETED | OUTPATIENT
Start: 2019-04-27 | End: 2019-04-27

## 2019-04-27 RX ORDER — ALBUMIN HUMAN 50 G/1000ML
25 SOLUTION INTRAVENOUS ONCE
Status: COMPLETED | OUTPATIENT
Start: 2019-04-27 | End: 2019-04-27

## 2019-04-27 RX ORDER — PREDNISONE 10 MG/1
20 TABLET ORAL DAILY
Status: DISCONTINUED | OUTPATIENT
Start: 2019-05-02 | End: 2019-05-04 | Stop reason: HOSPADM

## 2019-04-27 RX ORDER — METHYLPREDNISOLONE SOD SUCC 125 MG
100 VIAL (EA) INJECTION EVERY 12 HOURS
Status: COMPLETED | OUTPATIENT
Start: 2019-04-27 | End: 2019-04-27

## 2019-04-27 RX ORDER — SULFAMETHOXAZOLE AND TRIMETHOPRIM 400; 80 MG/1; MG/1
1 TABLET ORAL EVERY MORNING
Status: DISCONTINUED | OUTPATIENT
Start: 2019-04-27 | End: 2019-05-04 | Stop reason: HOSPADM

## 2019-04-27 RX ORDER — NYSTATIN 100000 [USP'U]/ML
500000 SUSPENSION ORAL
Status: DISCONTINUED | OUTPATIENT
Start: 2019-04-27 | End: 2019-05-04 | Stop reason: HOSPADM

## 2019-04-27 RX ORDER — MAGNESIUM SULFATE HEPTAHYDRATE 40 MG/ML
2 INJECTION, SOLUTION INTRAVENOUS ONCE
Status: COMPLETED | OUTPATIENT
Start: 2019-04-27 | End: 2019-04-27

## 2019-04-27 RX ORDER — IBUPROFEN 200 MG
24 TABLET ORAL
Status: DISCONTINUED | OUTPATIENT
Start: 2019-04-27 | End: 2019-04-28

## 2019-04-27 RX ORDER — HYDRALAZINE HYDROCHLORIDE 20 MG/ML
10 INJECTION INTRAMUSCULAR; INTRAVENOUS EVERY 6 HOURS PRN
Status: DISCONTINUED | OUTPATIENT
Start: 2019-04-27 | End: 2019-04-27

## 2019-04-27 RX ORDER — GLUCAGON 1 MG
1 KIT INJECTION
Status: DISCONTINUED | OUTPATIENT
Start: 2019-04-27 | End: 2019-04-28

## 2019-04-27 RX ORDER — FAMOTIDINE 10 MG/ML
20 INJECTION INTRAVENOUS EVERY 12 HOURS
Status: DISCONTINUED | OUTPATIENT
Start: 2019-04-27 | End: 2019-04-29

## 2019-04-27 RX ORDER — HYDRALAZINE HYDROCHLORIDE 20 MG/ML
10 INJECTION INTRAMUSCULAR; INTRAVENOUS EVERY 4 HOURS PRN
Status: DISCONTINUED | OUTPATIENT
Start: 2019-04-27 | End: 2019-05-04 | Stop reason: HOSPADM

## 2019-04-27 RX ORDER — HYDRALAZINE HYDROCHLORIDE 20 MG/ML
INJECTION INTRAMUSCULAR; INTRAVENOUS
Status: DISPENSED
Start: 2019-04-27 | End: 2019-04-27

## 2019-04-27 RX ORDER — METHYLPREDNISOLONE SOD SUCC 125 MG
60 VIAL (EA) INJECTION EVERY 12 HOURS
Status: COMPLETED | OUTPATIENT
Start: 2019-04-29 | End: 2019-04-29

## 2019-04-27 RX ORDER — OXYCODONE HYDROCHLORIDE 5 MG/1
5 TABLET ORAL EVERY 4 HOURS PRN
Status: DISCONTINUED | OUTPATIENT
Start: 2019-04-27 | End: 2019-05-04 | Stop reason: HOSPADM

## 2019-04-27 RX ORDER — PROPOFOL 10 MG/ML
10 INJECTION, EMULSION INTRAVENOUS CONTINUOUS
Status: DISCONTINUED | OUTPATIENT
Start: 2019-04-27 | End: 2019-04-29

## 2019-04-27 RX ORDER — INSULIN ASPART 100 [IU]/ML
0-5 INJECTION, SOLUTION INTRAVENOUS; SUBCUTANEOUS
Status: DISCONTINUED | OUTPATIENT
Start: 2019-04-27 | End: 2019-04-28

## 2019-04-27 RX ORDER — DEXTROSE MONOHYDRATE AND SODIUM CHLORIDE 5; .45 G/100ML; G/100ML
INJECTION, SOLUTION INTRAVENOUS CONTINUOUS
Status: DISCONTINUED | OUTPATIENT
Start: 2019-04-27 | End: 2019-04-28

## 2019-04-27 RX ORDER — FENTANYL CITRATE 50 UG/ML
25 INJECTION, SOLUTION INTRAMUSCULAR; INTRAVENOUS
Status: DISCONTINUED | OUTPATIENT
Start: 2019-04-27 | End: 2019-04-29

## 2019-04-27 RX ORDER — HYDROMORPHONE HYDROCHLORIDE 1 MG/ML
0.5 INJECTION, SOLUTION INTRAMUSCULAR; INTRAVENOUS; SUBCUTANEOUS ONCE
Status: COMPLETED | OUTPATIENT
Start: 2019-04-27 | End: 2019-04-27

## 2019-04-27 RX ORDER — INSULIN ASPART 100 [IU]/ML
4-6 INJECTION, SOLUTION INTRAVENOUS; SUBCUTANEOUS
Status: DISCONTINUED | OUTPATIENT
Start: 2019-04-28 | End: 2019-04-28

## 2019-04-27 RX ORDER — MYCOPHENOLATE MOFETIL 200 MG/ML
1000 POWDER, FOR SUSPENSION ORAL 2 TIMES DAILY
Status: DISCONTINUED | OUTPATIENT
Start: 2019-04-27 | End: 2019-04-27

## 2019-04-27 RX ORDER — POTASSIUM CHLORIDE 20 MEQ/15ML
40 SOLUTION ORAL ONCE
Status: COMPLETED | OUTPATIENT
Start: 2019-04-27 | End: 2019-04-27

## 2019-04-27 RX ORDER — IBUPROFEN 200 MG
16 TABLET ORAL
Status: DISCONTINUED | OUTPATIENT
Start: 2019-04-27 | End: 2019-04-28

## 2019-04-27 RX ORDER — MYCOPHENOLATE MOFETIL 200 MG/ML
1000 POWDER, FOR SUSPENSION ORAL 2 TIMES DAILY
Status: DISCONTINUED | OUTPATIENT
Start: 2019-04-27 | End: 2019-04-29

## 2019-04-27 RX ORDER — OXYCODONE HYDROCHLORIDE 10 MG/1
10 TABLET ORAL EVERY 4 HOURS PRN
Status: DISCONTINUED | OUTPATIENT
Start: 2019-04-27 | End: 2019-05-04 | Stop reason: HOSPADM

## 2019-04-27 RX ORDER — VALGANCICLOVIR 450 MG/1
450 TABLET, FILM COATED ORAL DAILY
Status: DISCONTINUED | OUTPATIENT
Start: 2019-05-06 | End: 2019-04-29

## 2019-04-27 RX ADMIN — POTASSIUM CHLORIDE 40 MEQ: 20 SOLUTION ORAL at 10:04

## 2019-04-27 RX ADMIN — NYSTATIN 500000 UNITS: 500000 SUSPENSION ORAL at 06:04

## 2019-04-27 RX ADMIN — DEXTROSE AND SODIUM CHLORIDE: 5; .45 INJECTION, SOLUTION INTRAVENOUS at 12:04

## 2019-04-27 RX ADMIN — HYDROMORPHONE HYDROCHLORIDE 0.5 MG: 1 INJECTION, SOLUTION INTRAMUSCULAR; INTRAVENOUS; SUBCUTANEOUS at 10:04

## 2019-04-27 RX ADMIN — HEPARIN SODIUM 5000 UNITS: 5000 INJECTION, SOLUTION INTRAVENOUS; SUBCUTANEOUS at 09:04

## 2019-04-27 RX ADMIN — PHYTONADIONE 10 MG: 10 INJECTION, EMULSION INTRAMUSCULAR; INTRAVENOUS; SUBCUTANEOUS at 05:04

## 2019-04-27 RX ADMIN — FENTANYL CITRATE 25 MCG: 50 INJECTION INTRAMUSCULAR; INTRAVENOUS at 06:04

## 2019-04-27 RX ADMIN — PROPOFOL 50 MCG/KG/MIN: 10 INJECTION, EMULSION INTRAVENOUS at 12:04

## 2019-04-27 RX ADMIN — FENTANYL CITRATE 25 MCG: 50 INJECTION INTRAMUSCULAR; INTRAVENOUS at 04:04

## 2019-04-27 RX ADMIN — PHYTONADIONE 10 MG: 10 INJECTION, EMULSION INTRAMUSCULAR; INTRAVENOUS; SUBCUTANEOUS at 02:04

## 2019-04-27 RX ADMIN — AMPICILLIN AND SULBACTAM 3 G: 2; 1 INJECTION, POWDER, FOR SOLUTION INTRAVENOUS at 12:04

## 2019-04-27 RX ADMIN — AMPICILLIN AND SULBACTAM 3 G: 2; 1 INJECTION, POWDER, FOR SOLUTION INTRAVENOUS at 05:04

## 2019-04-27 RX ADMIN — FENTANYL CITRATE 25 MCG: 50 INJECTION INTRAMUSCULAR; INTRAVENOUS at 10:04

## 2019-04-27 RX ADMIN — FENTANYL CITRATE 25 MCG: 50 INJECTION INTRAMUSCULAR; INTRAVENOUS at 01:04

## 2019-04-27 RX ADMIN — SODIUM CHLORIDE 8.5 UNITS/HR: 9 INJECTION, SOLUTION INTRAVENOUS at 07:04

## 2019-04-27 RX ADMIN — METHYLPREDNISOLONE SODIUM SUCCINATE 100 MG: 125 INJECTION, POWDER, FOR SOLUTION INTRAMUSCULAR; INTRAVENOUS at 08:04

## 2019-04-27 RX ADMIN — POTASSIUM CHLORIDE 20 MEQ: 200 INJECTION, SOLUTION INTRAVENOUS at 02:04

## 2019-04-27 RX ADMIN — OXYCODONE HYDROCHLORIDE 10 MG: 10 TABLET ORAL at 08:04

## 2019-04-27 RX ADMIN — FAMOTIDINE 20 MG: 10 INJECTION, SOLUTION INTRAVENOUS at 08:04

## 2019-04-27 RX ADMIN — POTASSIUM CHLORIDE 20 MEQ: 200 INJECTION, SOLUTION INTRAVENOUS at 12:04

## 2019-04-27 RX ADMIN — Medication 1 MG: at 06:04

## 2019-04-27 RX ADMIN — NYSTATIN 500000 UNITS: 500000 SUSPENSION ORAL at 02:04

## 2019-04-27 RX ADMIN — Medication 1000 MG: at 12:04

## 2019-04-27 RX ADMIN — ALBUMIN HUMAN 25 G: 0.05 INJECTION, SOLUTION INTRAVENOUS at 06:04

## 2019-04-27 RX ADMIN — Medication 1000 MG: at 09:04

## 2019-04-27 RX ADMIN — FENTANYL CITRATE 25 MCG: 50 INJECTION INTRAMUSCULAR; INTRAVENOUS at 09:04

## 2019-04-27 RX ADMIN — PHYTONADIONE 10 MG: 10 INJECTION, EMULSION INTRAMUSCULAR; INTRAVENOUS; SUBCUTANEOUS at 09:04

## 2019-04-27 RX ADMIN — PROPOFOL 50 MCG/KG/MIN: 10 INJECTION, EMULSION INTRAVENOUS at 04:04

## 2019-04-27 RX ADMIN — MAGNESIUM SULFATE IN WATER 2 G: 40 INJECTION, SOLUTION INTRAVENOUS at 03:04

## 2019-04-27 RX ADMIN — FENTANYL CITRATE 25 MCG: 50 INJECTION INTRAMUSCULAR; INTRAVENOUS at 07:04

## 2019-04-27 RX ADMIN — SODIUM CHLORIDE 28.5 UNITS/HR: 9 INJECTION, SOLUTION INTRAVENOUS at 07:04

## 2019-04-27 RX ADMIN — HYDRALAZINE HYDROCHLORIDE 10 MG: 20 INJECTION INTRAMUSCULAR; INTRAVENOUS at 05:04

## 2019-04-27 RX ADMIN — HYDRALAZINE HYDROCHLORIDE 10 MG: 20 INJECTION INTRAMUSCULAR; INTRAVENOUS at 09:04

## 2019-04-27 RX ADMIN — ALBUMIN HUMAN 25 G: 0.05 INJECTION, SOLUTION INTRAVENOUS at 04:04

## 2019-04-27 RX ADMIN — NYSTATIN 500000 UNITS: 500000 SUSPENSION ORAL at 09:04

## 2019-04-27 RX ADMIN — OXYCODONE HYDROCHLORIDE 5 MG: 5 TABLET ORAL at 12:04

## 2019-04-27 RX ADMIN — HEPARIN SODIUM 5000 UNITS: 5000 INJECTION, SOLUTION INTRAVENOUS; SUBCUTANEOUS at 02:04

## 2019-04-27 RX ADMIN — OXYCODONE HYDROCHLORIDE 10 MG: 10 TABLET ORAL at 03:04

## 2019-04-27 RX ADMIN — HEPARIN SODIUM 5000 UNITS: 5000 INJECTION, SOLUTION INTRAVENOUS; SUBCUTANEOUS at 06:04

## 2019-04-27 RX ADMIN — Medication 1 MG: at 08:04

## 2019-04-27 RX ADMIN — PROPOFOL 50 MCG/KG/MIN: 10 INJECTION, EMULSION INTRAVENOUS at 06:04

## 2019-04-27 RX ADMIN — SODIUM CHLORIDE 10.5 UNITS/HR: 9 INJECTION, SOLUTION INTRAVENOUS at 12:04

## 2019-04-27 RX ADMIN — SODIUM CHLORIDE 14.5 UNITS/HR: 9 INJECTION, SOLUTION INTRAVENOUS at 03:04

## 2019-04-27 NOTE — PROGRESS NOTES
Received PT from OR.  Intubated with a 7.5 ETT measured and secured at 23cm at the lips.  Tube secured with commercial tube mccord; See flowsheet for ventilator settings.  Ambu bag at bedside.  O2 sats adequate.

## 2019-04-27 NOTE — NURSING
Transplant team notified that pt's urine output continues to be 30 ml/hr, dark cosme,concentrated. CVP of 4. Telephone orders to give 500 ml 5% albumin.

## 2019-04-27 NOTE — SUBJECTIVE & OBJECTIVE
Scheduled Meds:   famotidine (PF)  20 mg Intravenous Q12H    heparin (porcine)  5,000 Units Subcutaneous Q8H    methylPREDNISolone sodium succinate  100 mg Intravenous Q12H    Followed by    [START ON 4/28/2019] methylPREDNISolone sodium succinate  80 mg Intravenous Q12H    Followed by    [START ON 4/29/2019] methylPREDNISolone sodium succinate  60 mg Intravenous Q12H    Followed by    [START ON 4/30/2019] methylPREDNISolone sodium succinate  40 mg Intravenous Q12H    Followed by    [START ON 5/1/2019] methylPREDNISolone sodium succinate  20 mg Intravenous Q12H    Followed by    [START ON 5/2/2019] predniSONE  20 mg Oral Daily    mycophenolate mofetil  1,000 mg Oral BID    nystatin  500,000 Units Mouth/Throat TID PC    phytonadione ((AQUA-MEPHYTON) IVPB  10 mg Intravenous Q8H    sulfamethoxazole-trimethoprim 400-80mg  1 tablet Oral Daily    tacrolimus  1 mg Oral BID    [START ON 5/6/2019] valGANciclovir  450 mg Oral Daily     Continuous Infusions:   dextrose 5 % and 0.45 % NaCl 100 mL/hr at 04/27/19 1300    insulin (HUMAN R) infusion (adults) 5.5 Units/hr (04/27/19 1300)    propofol Stopped (04/27/19 0900)     PRN Meds:sodium chloride, sodium chloride, dextrose 50%, dextrose 50%, fentaNYL, hydrALAZINE, oxyCODONE, oxyCODONE    Review of Systems   Otherwise negative   Objective:     Vital Signs (Most Recent):  Temp: 99.5 °F (37.5 °C) (04/27/19 1100)  Pulse: 66 (04/27/19 1300)  Resp: 19 (04/27/19 1300)  BP: (!) 162/77 (04/27/19 1300)  SpO2: 100 % (04/27/19 1300) Vital Signs (24h Range):  Temp:  [98 °F (36.7 °C)-99.9 °F (37.7 °C)] 99.5 °F (37.5 °C)  Pulse:  [66-85] 66  Resp:  [16-19] 19  SpO2:  [99 %-100 %] 100 %  BP: (110-175)/(60-78) 162/77  Arterial Line BP: (117-167)/(51-70) 167/65     Weight: 85.3 kg (188 lb 0.8 oz)  Body mass index is 30.35 kg/m².    Intake/Output - Last 3 Shifts       04/25 0700 - 04/26 0659 04/26 0700 - 04/27 0659 04/27 0700 - 04/28 0659    I.V. (mL/kg)  7061.3 (82.8)     Blood   2288     NG/GT   100    IV Piggyback  250 100    Total Intake(mL/kg)  9599.3 (112.5) 200 (2.3)    Urine (mL/kg/hr)  3085 310 (0.5)    Drains  486 222    Stool   0    Blood  1800     Total Output  5371 532    Net  +4228.3 -332           Stool Occurrence   0 x          Physical Exam  A&O, NAD  RRR  No Resp Distress on NC post extubation   ABD: mildly distended, drains with SS output, AppTTP    Laboratory:  Immunosuppressants         Stop Route Frequency     mycophenolate mofetil 200 mg/mL suspension 1,000 mg      -- Oral 2 times daily     tacrolimus (PROGRAF) 1 mg/mL oral syringe      -- Oral 2 times daily        CBC:   Recent Labs   Lab 04/27/19  1157   WBC 7.30   RBC 2.96*   HGB 9.7*   HCT 26.4*   PLT 63*   MCV 89   MCH 32.8*   MCHC 36.7*     CMP:   Recent Labs   Lab 04/27/19  0308  04/27/19  1157   *  --   --    CALCIUM 8.5*  --   --    ALBUMIN 2.4*  --   --    PROT 4.6*  --   --      --   --    K 4.3  --   --    CO2 21*  --   --      --   --    BUN 18  --   --    CREATININE 1.0  --   --    ALKPHOS 75  --   --    ALT 2,224*  --   --    AST 11,714*  11,714*   < > 5,559*   BILITOT 3.1*  --   --     < > = values in this interval not displayed.     Coagulation:   Recent Labs   Lab 04/27/19  0308  04/27/19  1157   INR 1.5*   < > 1.3*   APTT 28.3  --   --     < > = values in this interval not displayed.       Diagnostic Results:  US Liver Transplant Post:    Results for orders placed during the hospital encounter of 04/26/19   US Liver Transplant Post    Narrative EXAMINATION:  US LIVER TRANSPLANT POST    CLINICAL HISTORY:  Assess perfusion following liver transplant;    TECHNIQUE:  Limited abdominal ultrasound of the transplant liver with Doppler evaluation.  Color and spectral Doppler were performed.    COMPARISON:  None.    FINDINGS:  Patient is status post orthotopic liver transplantday 1.  Liver allograft is normal in size.  The liver demonstrates mild diffusely increased echogenicity which can  be seen with fatty infiltration.  No focal hepatic lesions are seen.  There is a 9.8 x 7.3 x 1.8 cm fluid collection posterior to the right hepatic lobe.  There is also a small amount of free fluid.    The common duct is not dilated, measuring 5 mm.  No dilated intrahepatic radicles are seen.    Color flow and spectral waveform analysis was performed.  The main portal vein, right portal vein, left portal vein, middle hepatic vein, right hepatic vein, left hepatic vein, and IVC are patent with proper directional flow.    Anastomosis site of the main hepatic artery demonstrates a peak systolic velocity 101 cm/sec.    Main hepatic artery demonstrates resistive index 0.81-0.87 with normal waveform    Left hepatic artery shows resistive index 0.86-1 with normal waveform.    Anterior branch of the right hepatic artery demonstrates resistive index 1 with normal waveform.    Posterior branch of the right hepatic artery demonstrates resistive index 1 with normal waveform.      Impression Day 1 liver transplant with small amount of scattered free fluid, 1 focal fluid collection, and increased echogenicity of the hepatic parenchyma suggesting fatty infiltration of the donor liver.  The resistive indices in the hepatic arteries are elevated, as high as 1, throughout the liver.  Follow-up ultrasound is recommended as this can be a negative prognostic sign of the day 1 ultrasound.    COMMUNICATION  This critical result was discovered/received at 0855.  The critical information above was relayed directly by me by telephone to Dr. Levy On April 27, 2019 at 0905.      Electronically signed by: Yael Smith MD  Date:    04/27/2019  Time:    09:08

## 2019-04-27 NOTE — PROGRESS NOTES
[facility]  Liver Transplant  Progress Note    Patient Name: Cesario Bailey  MRN: [id number]  Admission Date: 2019  Hospital Length of Stay: 1 days  Code Status: Full Code  Primary Care Provider: Primary Doctor No  Post-Op Day 1 Day Post-Op    Admit Diagnosis: HCC, Alcoholic Cirrhosis  Procedures: Liver txp     ORGAN:   LIVER  Disease Etiology: Primary Liver Malignancy: Hepatoma (HCC) and Cirrhosis  Donor Type:    - Brain Death  CDC High Risk:   No  Donor CMV Status:   Donor CMV Status: Negative  Donor HBcAB:   Negative  Donor HCV Status:   Negative  Donor HBV MARIA R: Negative  Donor HCV MARIA R: Negative  Whole or Partial: Whole Liver  Biliary Anastomosis: End to End  Arterial Anatomy: Standard    Subjective:     Scheduled Meds:   famotidine (PF)  20 mg Intravenous Q12H    heparin (porcine)  5,000 Units Subcutaneous Q8H    methylPREDNISolone sodium succinate  100 mg Intravenous Q12H    Followed by    [START ON 2019] methylPREDNISolone sodium succinate  80 mg Intravenous Q12H    Followed by    [START ON 2019] methylPREDNISolone sodium succinate  60 mg Intravenous Q12H    Followed by    [START ON 2019] methylPREDNISolone sodium succinate  40 mg Intravenous Q12H    Followed by    [START ON 2019] methylPREDNISolone sodium succinate  20 mg Intravenous Q12H    Followed by    [START ON 2019] predniSONE  20 mg Oral Daily    mycophenolate mofetil  1,000 mg Oral BID    nystatin  500,000 Units Mouth/Throat TID PC    phytonadione ((AQUA-MEPHYTON) IVPB  10 mg Intravenous Q8H    sulfamethoxazole-trimethoprim 400-80mg  1 tablet Oral Daily    tacrolimus  1 mg Oral BID    [START ON 2019] valGANciclovir  450 mg Oral Daily     Continuous Infusions:   dextrose 5 % and 0.45 % NaCl 100 mL/hr at 19 1300    insulin (HUMAN R) infusion (adults) 5.5 Units/hr (19 1300)    propofol Stopped (19 0900)     PRN Meds:sodium chloride, sodium chloride,  dextrose 50%, dextrose 50%, fentaNYL, hydrALAZINE, oxyCODONE, oxyCODONE    Review of Systems   Otherwise negative   Objective:     Vital Signs (Most Recent):  Temp: 99.5 °F (37.5 °C) (04/27/19 1100)  Pulse: 66 (04/27/19 1300)  Resp: 19 (04/27/19 1300)  BP: (!) 162/77 (04/27/19 1300)  SpO2: 100 % (04/27/19 1300) Vital Signs (24h Range):  Temp:  [98 °F (36.7 °C)-99.9 °F (37.7 °C)] 99.5 °F (37.5 °C)  Pulse:  [66-85] 66  Resp:  [16-19] 19  SpO2:  [99 %-100 %] 100 %  BP: (110-175)/(60-78) 162/77  Arterial Line BP: (117-167)/(51-70) 167/65     Weight: 85.3 kg (188 lb 0.8 oz)  Body mass index is 30.35 kg/m².    Intake/Output - Last 3 Shifts       04/25 0700 - 04/26 0659 04/26 0700 - 04/27 0659 04/27 0700 - 04/28 0659    I.V. (mL/kg)  7061.3 (82.8)     Blood  2288     NG/GT   100    IV Piggyback  250 100    Total Intake(mL/kg)  9599.3 (112.5) 200 (2.3)    Urine (mL/kg/hr)  3085 310 (0.5)    Drains  486 222    Stool   0    Blood  1800     Total Output  5371 532    Net  +4228.3 -332           Stool Occurrence   0 x          Physical Exam  A&O, NAD  RRR  No Resp Distress on NC post extubation   ABD: mildly distended, drains with SS output, AppTTP    Laboratory:  Immunosuppressants         Stop Route Frequency     mycophenolate mofetil 200 mg/mL suspension 1,000 mg      -- Oral 2 times daily     tacrolimus (PROGRAF) 1 mg/mL oral syringe      -- Oral 2 times daily        CBC:   Recent Labs   Lab 04/27/19  1157   WBC 7.30   RBC 2.96*   HGB 9.7*   HCT 26.4*   PLT 63*   MCV 89   MCH 32.8*   MCHC 36.7*     CMP:   Recent Labs   Lab 04/27/19  0308  04/27/19  1157   *  --   --    CALCIUM 8.5*  --   --    ALBUMIN 2.4*  --   --    PROT 4.6*  --   --      --   --    K 4.3  --   --    CO2 21*  --   --      --   --    BUN 18  --   --    CREATININE 1.0  --   --    ALKPHOS 75  --   --    ALT 2,224*  --   --    AST 11,714*  11,714*   < > 5,559*   BILITOT 3.1*  --   --     < > = values in this interval not displayed.      Coagulation:   Recent Labs   Lab 04/27/19  0308  04/27/19  1157   INR 1.5*   < > 1.3*   APTT 28.3  --   --     < > = values in this interval not displayed.       Diagnostic Results:  US Liver Transplant Post:    Results for orders placed during the hospital encounter of 04/26/19   US Liver Transplant Post    Narrative EXAMINATION:  US LIVER TRANSPLANT POST    CLINICAL HISTORY:  Assess perfusion following liver transplant;    TECHNIQUE:  Limited abdominal ultrasound of the transplant liver with Doppler evaluation.  Color and spectral Doppler were performed.    COMPARISON:  None.    FINDINGS:  Patient is status post orthotopic liver transplantday 1.  Liver allograft is normal in size.  The liver demonstrates mild diffusely increased echogenicity which can be seen with fatty infiltration.  No focal hepatic lesions are seen.  There is a 9.8 x 7.3 x 1.8 cm fluid collection posterior to the right hepatic lobe.  There is also a small amount of free fluid.    The common duct is not dilated, measuring 5 mm.  No dilated intrahepatic radicles are seen.    Color flow and spectral waveform analysis was performed.  The main portal vein, right portal vein, left portal vein, middle hepatic vein, right hepatic vein, left hepatic vein, and IVC are patent with proper directional flow.    Anastomosis site of the main hepatic artery demonstrates a peak systolic velocity 101 cm/sec.    Main hepatic artery demonstrates resistive index 0.81-0.87 with normal waveform    Left hepatic artery shows resistive index 0.86-1 with normal waveform.    Anterior branch of the right hepatic artery demonstrates resistive index 1 with normal waveform.    Posterior branch of the right hepatic artery demonstrates resistive index 1 with normal waveform.      Impression Day 1 liver transplant with small amount of scattered free fluid, 1 focal fluid collection, and increased echogenicity of the hepatic parenchyma suggesting fatty infiltration of the donor  liver.  The resistive indices in the hepatic arteries are elevated, as high as 1, throughout the liver.  Follow-up ultrasound is recommended as this can be a negative prognostic sign of the day 1 ultrasound.    COMMUNICATION  This critical result was discovered/received at 0855.  The critical information above was relayed directly by me by telephone to Dr. Levy On April 27, 2019 at 0905.      Electronically signed by: Yael Smith MD  Date:    04/27/2019  Time:    09:08     Assessment/Plan:   Cesario Bailey is a 53 y.o. male     GI  * Liver replaced by transplant  Liver replaced by transplant  Cesario Bailey is a 53 y.o. male is s/p liver transplant on 4/27.  Pt has hx of alcoholic cirrhosis and HCC s/p tace x2.       Neuro:   -A&O, NAD after extubation  - PRN pain meds     Pulmonary:   -Stable on minimal NC s/p extubation  - Wean NC as tolerated  - IS     Cardiac:   - HDS without pressors  - d/c a line and all CVC except trialysis     Renal:    -  Preop BUN/Cr WNLs  - Monitor UOP     ID:   - Bactrim, Valganciclovir, Amp-Sulbactam  - Prednisone taper, Prograf      Hem/Onc:   - 1 unit intra-op, post-op CBC stable     Endocrine:    - Insulin gtt     Fluids/Electrolytes/Nutrition/GI:   - ADAT after extubation  - leave drains for now  - Replace electrolytes PRN     PPx:  - DVT: Heparin  - Bowel: Protonix     DISPO:  - Continue SICU care for now              The patients clinical status was discussed at multidisplinary rounds, involving transplant surgery, transplant medicine, pharmacy, nursing, nutrition, and social work.    Mundo Sneed MD  Liver Transplant  [facility]

## 2019-04-27 NOTE — ANESTHESIA PROCEDURE NOTES
[place]    Diagnosis: olt  Doctor requesting consult: brandon  Patient location during procedure: done in OR  Procedure start time: 4/26/2019 6:05 PM  Timeout: 4/26/2019 5:55 PM  Staffing  Anesthesiologist: Dominique Velazquez MD  Performed: anesthesiologist   Anesthesiologist was present at the time of the procedure.  Preanesthetic Checklist  Completed: patient identified, site marked, surgical consent, pre-op evaluation, timeout performed, IV checked, risks and benefits discussed, monitors and equipment checked and anesthesia consent given  [place]  Skin Prep: chlorhexidine gluconate  Location: right,  internal jugular vein  Vessel Caliber: medium, patent, compressibility normal  Vascular Doppler:  not done  Introducer: 9 Fr single lumen,   Catheter Size: 9 Fr  Catheter placement by yes. Heme positive aspiration all ports. PAC floated with balloon up not wedged  Locked at: 38 cm.Insertion Attempts: 1  Indication: intravenous therapy, hemodynamic monitoring, transvenous pacing  Ultrasound Guidance  Needle advanced into vessel with real time Ultrasound guidance.  Guidewire confirmed in vessel.  Sterile sheath used.  Assessment  Central Line Bundle Protocol followed. Hand hygiene before procedure, surgical cap worn, surgical mask worn, sterile surgical gloves worn, large sterile drape used.  Verification: ultrasound and blood return  Dressing: sutured in place and taped and secured with tape  Patient: Tolerated Well

## 2019-04-27 NOTE — PLAN OF CARE
ALEXIS Lewis used [employer] system to update family regarding start of case and progress.  All questions answered via .  Connection lost prior to  providing  ID number.

## 2019-04-27 NOTE — OP NOTE
Operative Report    Date of Procedure: 04/26/2019    Surgeons:  Surgeon(s) and Role:     * Thien Villanueva MD - Primary     * Mundo England MD - Assisting    First Assistant Attestation:  The presence of an additional attending surgeon functioning as first assistant was required due to the complexity of the procedure relative to any available residents. I certify that no resident was available who was qualified to serve as first assistant. Duties performed by the assistant included assisting the primary surgeon.    Pre-operative Diagnosis (UNOS): End Stage Liver Disease due to Primary Liver Malignancy: Hepatoma (HCC) and Cirrhosis, Alcoholic Cirrhosis and Chronic hepatic failure without coma K72.10    Post-operative Diagnosis: Same; portal vein status:  partial patent    Principal Procedure Perfomed: Orthotopic Liver Transplant  (whole liver, DBD donor, biliary reconstruction end to end donor common hepatic duct to recipient common hepatic duct  )  Additional Procedures Perfomed:   None    Anesthesia: General endotracheal  Findings: cirrhosis, portal hypertension, ascites and adhesions    Preamble  Indications and Patient Counseling: The patient is a 53 y.o. year-old male with Primary Liver Malignancy: Hepatoma (HCC) and Cirrhosis, Alcoholic Cirrhosis (UNOS terminology) who has been evaluated for a liver transplant.  The procedure was thoroughly discussed with the patient, including potential risks, complications, and alternatives. Specific complications mentioned included death, graft non-function, bleeding, infection, rejection, renal failure, respiratory failure, and neurologic deficits, as well as the possibility of other complications not specifically mentioned.    Donor Risk Factors:  Prior to the operation, the patient was advised of any donor-specific risk factors requiring specific disclosure. Factors in this case included donation after cardiac death.      Specific PHS Increased Risk Behavior criteria  for the organ donor include:  None    All questions were answered, the patient voiced appropriate understanding, and he agreed to proceed with the planned procedure.    ABO Confirmation: Immediately following arrival of the donor organ and prior to implantation, a formal ABO confirmation was done according to hospital and UNOS policies.  I confirmed the UNOS ID number of the donor organ ([id number]) and the donor and recipient ABO types, directly verifying these data by comparison with the UNOS Match Run report ([id number].  This confirmation was personally done by an attending surgeon and circulating nurse, and is officially documented elsewhere.    Time-Out: A complete time out was carried out prior to incision, with confirmation of patient identity, correct procedure, correct operative site, appropriate antibiotic prophylaxis, review of any known allergies, and presence of all needed equipment.    Procedure in Detail  Following the induction of general endotracheal anesthesia, appropriate arterial and venous lines were placed by the anesthesia team.  Care was taken to pad all pressure points and avoid any potential traction injuries from positioning. The urinary bladder was catheterized.  Sequential compression boots and Therese Huggers were used. The chest, abdomen, and upper thighs were sterilely prepped and draped.     The abdomen was entered via a wide bilateral subcostal incision. 0 mL of ascites was removed. The round ligament was ligated and divided. The falciform, left triangular, and gastrohepatic ligaments were divided using the electocautery, with 2-0 silk ties as needed. The Blank self-retaining retractor was placed to provide exposure. Adhesions between the abdominal viscera and the abdominal wall and the undersurface of the liver were divided as needed using cautery dissection and silk ties or suture ligatures. Hilar dissection was then carried out, with ligation of the right and left hepatic arteries as  well as the cystic duct and the common hepatic duct. The recipient arterial anatomy was found to be: standard. The portal vein was exposed circumferentially from its bifurcation down to the superior border of the pancreas. The portal vein was found to be partial patent.  Attention was next directed to the right side of the liver where the right triangular ligament was taken down, exposing the bare area of the liver, and the IVC. The infrahepatic IVC was isolated, followed by mobilization of the retrohepatic cava, division of the right adrenal vein, and isolation of the suprahepatic IVC. The patient was given 2000 units of heparin prior to caval cross-clamping. portal vein thrombosis was managed with thromboendvenectomy. After assuring adequate stability after cross-clamping, the native liver was excised and the allograft liver was brought to the operative field.  The liver was perfused with cold 5% albumin during implantation to displace the organ preservation solution.  IVC reconstruction technique consisted of end to end ivc using 3-0 and 4-0 polypropylene as appropriate.  The donor portal vein was then anastomosed to the recipient portal inflow site (end portal vein) with 5-0 polypropylene. Once this was completed, anesthesia was notified and the liver was re-perfused. Copious irrigation with warm saline and temporary finger occlusion of portal inflow were used as needed to avoid any problems with hypothermia. Temporary packing, electocautery, and polypropylene suture ligatures were used as appropriate to provide hemostasis. Once this was satisfactory, attention was directed to the arterial reconstruction. As per the protocol for livers from DCD donors, the allograft hepatic artery was infused with 2 mg of TPA and 5 mg of verapamil. Arterial inflow was provided to the graft by anastomosing the recipient right-left hepatic branch patch to the donor  common hepatic artery using 6-0 polypropylene. The liver was  assessed for adequacy of perfusion, which was not satisfactory. The gallbladder was then removed from the allograft liver, and a temporary packing period was carried out to help assure hemostatis.Attention was next directed toward the bile duct. The donor bile duct was prepared and assessed for adequate vascular supply. Biliary reconstruction was then performed by anastomosing the recipient common hepatic duct to the donor duct using 6-0 PDS sutures.  The biliary stent used was: none.  A final check for hemostasis was made. 2 19f Lan drains were placed through separate incisions below the transverse abdominal incision and placed in the usual positions. The cavity was irrigated with saline. The abdomen was closed in layers using running #1 PDS suture. The incision was irrigated and the skin was closed with staples. At the end of the case all instrument, needle, and sponge counts were correct. The patient was taken to the ICU in good condition.     Fluids Administered:   Crystalloid (mL) 4,000   Colloid (mL) 500   FFP (Units) 2   Cryoprecipitate (Units)  10   Platelets (Units) 6   Cell Saver (CCs) 600   Cell Saver (mL) 600      Specimens: Explant liver  Drains: 19f Lan drains x 2    Additional Findings:    Estimated Blood Loss: 1,800 mL  Ascites Evacuated: 0 mL    Ischemic Times:  Time of portal reperfusion: 4/26/2019  8:47 PM  Time of arterial reperfusion: 4/26/2019  9:20 PM  Anastomosis (warm ischemia) time: 26 minutes  Cold ischemia time: 339 minutes    Surgical Data:  Graft type (whole vs. partial): whole liver  IVC reconstruction: end to end ivc  Portal vein status: partial patent  Portal graft performed? no  Donor arterial anatomy: standard  Donor arterial inflow: common hepatic artery  Recipient arterial anatomy: standard  Recipient arterial inflow: right-left hepatic branch patch  Arterial graft performed? no  Biliary reconstruction: end to end (donor common hepatic duct to recipient common hepatic  duct)  Biliary stent: none  Disposition of Vessels from donor of transplanted organ: sent to blood bank  Damage during procurement: no  Procurement damage comments: none  Donor Factors:  UNOS ID: )[id number]  UNOS Match Run: [id number]  Donor type:  - brain death  Donor with reported PHS increased risk behavior: no  Donor CMV serologic status: Negative  Donor with known cancer: no  Donor HCV serologic status: Negative  Donor HBcAb: Negative  Donor HBV MARIA R: Negative  Donor HCV MARIA R: Negative  Liver weight: 2318 grams

## 2019-04-27 NOTE — H&P
[facility]  Critical Care - Surgery  History & Physical    Patient Name: Cesario Bailey  MRN: [id number]  Admission Date: 4/26/2019  Code Status: Full Code  Attending Physician: Thien Villanueva MD   Primary Care Provider: Primary Doctor No   Principal Problem: Liver replaced by transplant    Subjective:     HPI:   53 year old male with pmhx of etoh cirrhosis, portal HTN, esophageal varices, and ESLD who presented for liver transplant.      Clinical Summary: Patient was diagnosed with alcohol-induced cirrhosis about 5 years ago. He stopped drinking since. He developed ascites, that resolved later, required paracentesis x 1. Also thoracentesis x 2 three years ago. He had an episode variceal bleed in 2016 - EVL done. Has been on nadolol since. Liver decompensations have been controlled but developed HCC on surveillance screening - 2.1 cm with HCC features.      HCC Treatment History: Tace 6/11/2018, Tace 2/4/2019. Last CT A/P shows no evidence of residual dx or recurrence but does show a partial PVT extending to the SMV, unchanged since 1/2019.         MELD Score: MELD-Na score: 13 at 4/26/2019  1:25 PM  MELD score: 12 at 4/26/2019  1:25 PM  Calculated from:  Serum Creatinine: 1.0 mg/dL at 4/26/2019  1:25 PM  Serum Sodium: 136 mmol/L at 4/26/2019  1:25 PM  Total Bilirubin: 2.6 mg/dL at 4/26/2019  1:25 PM  INR(ratio): 1.2 at 4/26/2019  1:25 PM  Age: 53 years    He is admitted to SICU following liver transplantation.  He is brought up intubated without any pressor requirements.  He was given 1 u pRBC, 4 of FFP, 600 ml of cellsaver.        Hospital/ICU Course:  No notes on file    Follow-up For: Procedure(s) (LRB):  TRANSPLANT, LIVER (N/A)    Post-Operative Day: 1 Day Post-Op     Past Medical History:   Diagnosis Date    Alcoholic cirrhosis     Diabetes mellitus     Encounter for blood transfusion     HCC (hepatocellular carcinoma)     Portal hypertension        Past Surgical History:   Procedure  Laterality Date    COLONOSCOPY N/A 7/23/2018    Performed by [facility] at [facility] ([facility]TH FLR)    EMBOLIZATION      ESOPHAGOGASTRODUODENOSCOPY (EGD) N/A 7/23/2018    Performed by Romain [facility]ora MD at [facility] ENDO (4TH FLR)    HERNIA REPAIR      umbilical hernia repair, large mesh placed.        Review of patient's allergies indicates:   Allergen Reactions    Aspirin        Family History     Problem Relation (Age of Onset)    Diabetes Father    Heart disease Mother    Hyperlipidemia Mother    Hypertension Mother    Stroke Mother        Tobacco Use    Smoking status: Never Smoker    Smokeless tobacco: Never Used   Substance and Sexual Activity    Alcohol use: Never     Frequency: Never    Drug use: Never    Sexual activity: Not on file      Review of Systems  Objective:     Vital Signs (Most Recent):  Temp: (P) 98 °F (36.7 °C) (04/27/19 0006)  Pulse: 61 (04/26/19 1334)  Resp: 18 (04/26/19 1334)  BP: 132/63 (04/26/19 1334)  SpO2: 100 % (04/26/19 1334) Vital Signs (24h Range):  Temp:  [98 °F (36.7 °C)-98.7 °F (37.1 °C)] (P) 98 °F (36.7 °C)  Pulse:  [61] 61  Resp:  [18] 18  SpO2:  [100 %] 100 %  BP: (132)/(63) 132/63     Weight: 78.1 kg (172 lb 2.9 oz)  Body mass index is 27.79 kg/m².      Intake/Output Summary (Last 24 hours) at 4/27/2019 0006  Last data filed at 4/26/2019 2333  Gross per 24 hour   Intake 7788 ml   Output 4100 ml   Net 3688 ml       Physical Exam   Constitutional: He is oriented to person, place, and time. He appears well-developed and well-nourished.   HENT:   Head: Normocephalic and atraumatic.   Intubated.  RIJ in place.   Eyes: Right eye exhibits no discharge. Left eye exhibits no discharge.   Neck: Normal range of motion. Neck supple.   Cardiovascular: Normal rate and regular rhythm.   Pulmonary/Chest: Effort normal. No respiratory distress.   Abdominal:   Non-distended.  Chevron incision present and c/d/i.  2 JANICE drains with SS output.   Musculoskeletal: Normal range of motion.    Neurological: He is alert and oriented to person, place, and time.   Skin: Skin is warm and dry.   Psychiatric: He has a normal mood and affect. His behavior is normal.   Vitals reviewed.      Vents:       Lines/Drains/Airways     Central Venous Catheter Line                 Percutaneous Central Line Insertion/Assessment - triple lumen  04/26/19 1805 less than 1 day         Pulmonary Artery Catheter Assessment  04/26/19 1805 less than 1 day          Drain                 Closed/Suction Drain Right Abdomen Bulb 19 Fr. -- days         Closed/Suction Drain Right Abdomen Bulb 19 Fr. -- days         Urethral Catheter 04/26/19 1756 Non-latex;Straight-tip 16 Fr. less than 1 day          Airway                 Airway - Non-Surgical 04/26/19 1743 Endotracheal Tube less than 1 day          Arterial Line                 Arterial Line 04/26/19 1744 Left Radial less than 1 day          Peripheral Intravenous Line                 Peripheral IV - Single Lumen 04/26/19 1353 18 G Left Forearm less than 1 day         Peripheral IV - Single Lumen 04/26/19 1755 Other (Comments) Left Antecubital less than 1 day                Significant Labs:    CBC/Anemia Profile:  Recent Labs   Lab 04/26/19  1325 04/26/19 1802 04/26/19 2131 04/26/19 2230   WBC 4.65  --   --   --    HGB 14.7 12.1* 10.3* 10.5*   HCT 41.1 33.0* 28.6* 28.5*   PLT 58* 43* 62* 67*   MCV 97  --   --   --    RDW 14.4  --   --   --         Chemistries:  Recent Labs   Lab 04/26/19 1325 04/26/19 1802 04/26/19 2131 04/26/19  2230    135* 134* 135*   K 4.9 3.4* 5.0 3.9     --   --   --    CO2 26  --   --   --    BUN 10  --   --   --    CREATININE 1.0  --   --   --    CALCIUM 9.3  --   --   --    ALBUMIN 3.3* 2.5* 1.8* 2.1*   PROT 7.5  --   --   --    BILITOT 2.6*  --   --   --    ALKPHOS 121  --   --   --    ALT 63*  --  2,549*  --    *  --  3,555*  --    MG 2.0 1.1* 1.8 1.7       Significant Imaging: I have reviewed all pertinent imaging  results/findings within the past 24 hours.    Assessment/Plan:     * Liver replaced by transplant  Cesario Bailey is a 53 y.o. male is s/p liver transplant on 4/27.  Pt has hx of alcoholic cirrhosis and HCC s/p tace x2.      Neuro:   -Sedated with propofol     Pulmonary:   -Intubated  -Will get post-op ABG  -Plan on extubating tomorrow    Cardiac:   - HDS without pressors  - Add Levo if needed     Renal:    -  Preop BUN/Cr WNLs  - Monitor UOP    ID:   - Bactrim, Valganciclovir, Amp-Sulbactam  - Prednisone taper, Prograf     Hem/Onc:   - 1 unit intra-op, recheck post-op CBC    Endocrine:    - Insulin gtt    Fluids/Electrolytes/Nutrition/GI:   - Replace electrolytes PRN    PPx:  - DVT: Heparin  - Bowel: Protonix    DISPO:  - Continue SICU care for now.  Plan for extubation in morning.           Critical care was time spent personally by me on the following activities: development of treatment plan with patient or surrogate and bedside caregivers, discussions with consultants, evaluation of patient's response to treatment, examination of patient, ordering and performing treatments and interventions, ordering and review of laboratory studies, ordering and review of radiographic studies, pulse oximetry, re-evaluation of patient's condition.  This critical care time did not overlap with that of any other provider or involve time for any procedures.     Lion Chin MD  Critical Care - Surgery  [facility]

## 2019-04-27 NOTE — HPI
Reason for Consult: Management of T2DM, Hyperglycemia     Surgical Procedure and Date: Liver Transplant 4/26/19    Diabetes diagnosis year: > 5 years ago  Lab Results   Component Value Date    HGBA1C 6.9 (H) 04/26/2019     Home Diabetes Medications:  Humulin 20 units TID with meals, Lantus 20-25 units q HS    How often checking glucose at home? in the AM/ when he feels bad   BG readings on regimen: 80s-100  Hypoglycemia on the regimen?  No  Missed doses on regimen?  No    Diabetes Complications include:     Hyperglycemia    Complicating diabetes co morbidities:   CIRRHOSIS      HPI:   Patient is a 53 y.o. male with a diagnosis of T2DM, etoh cirrhosis, portal HTN, esophageal varices, and ESLD who presented for liver transplant. Father with DM per chart review.  Endocrinology consulted for management of T2DM/hyperglycemia.

## 2019-04-27 NOTE — SUBJECTIVE & OBJECTIVE
Interval HPI:   Overnight events: Remains in SICU, post-op day 2. BG within goal ranges on IV intensive insulin infusion. Insulin infusion rate decreased to 8 units/hr this morning. Received Solu Medrol 100 mg IV this am.   Eating:   NPO   Nausea: No  Hypoglycemia and intervention: No  Fever: No  TPN and/or TF: No    PMH, PSH, FH, SH  reviewed     ROS:  Constitutional: Negative for weight changes.  Eyes: Negative for visual disturbance.  Respiratory: Negative for cough.   Cardiovascular: Negative for chest pain.  Gastrointestinal: Negative for nausea.  Endocrine: Negative for polyuria, polydipsia.  Musculoskeletal: Negative for back pain.  Skin: Negative for rash.  Neurological: Negative for syncope.  Psychiatric/Behavioral: Negative for depression.    Current Medications and/or Treatments Impacting Glycemic Control  Immunotherapy:    Immunosuppressants         Stop Route Frequency     mycophenolate mofetil 200 mg/mL suspension 1,000 mg      -- Oral 2 times daily     tacrolimus (PROGRAF) 1 mg/mL oral syringe      -- Oral 2 times daily        Steroids:   Hormones (From admission, onward)    Start     Stop Route Frequency Ordered    05/02/19 0900  predniSONE tablet 20 mg  (methylprednisolone taper panel)      -- Oral Daily 04/27/19 0001    05/01/19 0900  methylPREDNISolone sodium succinate injection 20 mg  (methylprednisolone taper panel)      05/02 0859 IV Every 12 hours 04/27/19 0001    04/30/19 0900  methylPREDNISolone sodium succinate injection 40 mg  (methylprednisolone taper panel)      05/01 0859 IV Every 12 hours 04/27/19 0001    04/29/19 0900  methylPREDNISolone sodium succinate injection 60 mg  (methylprednisolone taper panel)      04/30 0859 IV Every 12 hours 04/27/19 0001    04/28/19 0900  methylPREDNISolone sodium succinate injection 80 mg  (methylprednisolone taper panel)      04/29 0859 IV Every 12 hours 04/27/19 0001    04/27/19 0900  methylPREDNISolone sodium succinate injection 100 mg   (methylprednisolone taper panel)      04/28 0859 IV Every 12 hours 04/27/19 0001        Pressors:    Autonomic Drugs (From admission, onward)    None        Hyperglycemia/Diabetes Medications:   Antihyperglycemics (From admission, onward)    Start     Stop Route Frequency Ordered    04/27/19 0115  insulin regular (Humulin R) 100 Units in sodium chloride 0.9% 100 mL infusion     Question:  Insulin Rate Adjustment (DO NOT MODIFY ANSWER)  Answer:  \\[email or web address] [id number]    -- IV Continuous 04/27/19 0005             PHYSICAL EXAMINATION:  Vitals:    04/27/19 0725   BP:    Pulse: 80   Resp:    Temp:      Body mass index is 30.35 kg/m².    Physical Exam   Constitutional:  Well developed, well nourished, NAD.  ENT: External ears no masses with nose patent; normal hearing.   Neck:  Supple; trachea midline  Cardiovascular: Normal heart sounds, no LE edema.     Lungs:  Normal effort; lungs anterior bilaterally clear to auscultation.  Abdomen:  Soft, no masses, no hernias.  Hypoactive BS noted.  MS: No clubbing or cyanosis of nails noted; normal gait or unable to assess gait.  Skin: No rashes, lesions, or ulcers; no nodules.  Injection sites are ok. No lipo hypertropthy or atrophy. Chevron abdominal incision with dressing; JANICE x 2 to RLQ.  Psychiatric: Good judgement and insight; normal mood and affect.  Neurological: Cranial nerves are grossly intact.  Foot: nails in good condition, no amputations noted.

## 2019-04-27 NOTE — ANESTHESIA PROCEDURE NOTES
Central Line    Diagnosis: olt  Doctor requesting consult: brandon  Patient location during procedure: done in OR  Procedure start time: 4/26/2019 6:05 PM  Timeout: 4/26/2019 5:55 PM  Procedure end time: 4/26/2019 6:20 PM  Staffing  Anesthesiologist: Dominique Velazquez MD  Performed: anesthesiologist   Anesthesiologist was present at the time of the procedure.  Preanesthetic Checklist  Completed: patient identified, site marked, surgical consent, pre-op evaluation, timeout performed, IV checked, risks and benefits discussed, monitors and equipment checked and anesthesia consent given  Indication   Indication: hemodynamic monitoring, vascular access, med administration     Anesthesia   general anesthesia    Central Line   Skin Prep: skin prepped with ChloraPrep, skin prep agent completely dried prior to procedure  maximum sterile barriers used during central venous catheter insertion  hand hygiene performed prior to central venous catheter insertion  Location: right, internal jugular.   Catheter type: triple lumen  Catheter Size: 12 Fr  Ultrasound: vascular probe with ultrasound  Vessel Caliber: medium, patent  Vascular Doppler:  not done, compressibility normal  Needle advanced into vessel with real time Ultrasound guidance.  Image recorded and saved.   Manometry: Venous cannualation confirmed by visual estimation of blood vessel pressure using manometry.  Insertion Attempts: 1   Securement:line sutured, chlorhexidine patch, sterile dressing applied and blood return through all ports    Post-Procedure   Adverse Events:none    Guidewire

## 2019-04-27 NOTE — SIGNIFICANT EVENT
Pre-operative Discussion Note  Liver Transplant Surgery    Tis luis albertoon occurred at 1pm on 4/26/2019    Cesario Bailey is a 53 y.o. male admitted for liver transplant.  I discussed the planned procedure in detail, including expected hospital course and outcomes, benefits, risks, and potential complications.  Complications discussed included death, graft failure, bleeding, infection, rejection, and neurologic problems.  I discussed the risks of anesthesia, as well as the potential need for re-operation.  The possibility of other complications not specifically mentioned was also discussed.  Also, I discussed the need for lifelong immunosuppression and the possibility of serious complications from immunosuppressive drugs.    The discussion included the risks that the patient will incur if he elects to not have the proposed procedure.    Relevant donor-specific risk factors were disclosed and discussed with the patient, including:   DCD: I discussed the use of organs recovered by donation after cardiac death (DCD), including slightly decreased graft survival and greater risk of arterial and biliary complications. The potential advantage to the recipient is possibly receiving a transplant sooner by accepting such an organ. The patient that he is willing to consider such grafts.    Specific PHS Increased Risk Behavior criteria for the organ donor include:  None    HCV Infection Not applicable.    Hepatocellular Carcinoma Recurrence: I explained that the transplant procedure would begin with a search for extrahepatic tumor, and if such tumor is found, the transplant operation will be aborted.  If there is no detectable tumor outside the liver and the transplant proceeds as planned, there is still a risk of tumor recurrence following transplant, which can be fatal.    All questions were answered.  The patient and available family members voice understanding and agree to proceed with the transplant.    UNOS Patient  Status  Note on scores:  ICU = 10 = total assistance  TSU = 20-30 = partial assistance  Outpatient admitted for transplant requiring medical care in last year = 40-50 = partial assistance  Scores 60 or higher indicate no assistance, meaning no need for medical care in last year. This would be very unusual for a transplant candidate.    Functional Status: 50% - Requires considerable assistance and frequent medical care  Physical Capacity: No Limitations

## 2019-04-27 NOTE — PROGRESS NOTES
TRANSPLANT NOTE:    Admit Date: 2019    ORGAN:   LIVER  Disease Etiology: Primary Liver Malignancy: Hepatoma (HCC) and Cirrhosis  Donor CMV Status: Negative  Donor HCV Status: Negative  Donor HBcAb: Negative  Donor HBV MARIA R: Negative  Donor HCV MARIA R: Negative  Whole or Partial: Whole Liver  Biliary Anastomosis: End to End  Arterial Anatomy: Standard    Cesario Bailey is a 53 y.o. male s/p     - Brain Death liver transplant on 2019 (Liver) for Primary Liver Malignancy: Hepatoma (HCC) and Cirrhosis.  This patient will follow the Steroid Induction protocol.  This patients immunosuppression will include a steroid taper over 6 weeks, Cellcept for 3 months and Prograf maintenance.  Opportunistic infection prophylaxis will include acyclovir for 3 months (CMV D- R-), Bactrim for 6 months, and nystatin.  I have reviewed the pre-op medications and those have been restarted those, as appropriate.

## 2019-04-27 NOTE — SUBJECTIVE & OBJECTIVE
Follow-up For: Procedure(s) (LRB):  TRANSPLANT, LIVER (N/A)    Post-Operative Day: 1 Day Post-Op     Past Medical History:   Diagnosis Date    Alcoholic cirrhosis     Diabetes mellitus     Encounter for blood transfusion     HCC (hepatocellular carcinoma)     Portal hypertension        Past Surgical History:   Procedure Laterality Date    COLONOSCOPY N/A 7/23/2018    Performed by [facility]ain G[facility]ora MD at [facility] ENDO (4TH FLR)    EMBOLIZATION      ESOPHAGOGASTRODUODENOSCOPY (EGD) N/A 7/23/2018    Performed by Romain Gongora MD at [facility] ENDO (4TH FLR)    HERNIA REPAIR      umbilical hernia repair, large mesh placed.        Review of patient's allergies indicates:   Allergen Reactions    Aspirin        Family History     Problem Relation (Age of Onset)    Diabetes Father    Heart disease Mother    Hyperlipidemia Mother    Hypertension Mother    Stroke Mother        Tobacco Use    Smoking status: Never Smoker    Smokeless tobacco: Never Used   Substance and Sexual Activity    Alcohol use: Never     Frequency: Never    Drug use: Never    Sexual activity: Not on file      Review of Systems  Objective:     Vital Signs (Most Recent):  Temp: (P) 98 °F (36.7 °C) (04/27/19 0006)  Pulse: 61 (04/26/19 1334)  Resp: 18 (04/26/19 1334)  BP: 132/63 (04/26/19 1334)  SpO2: 100 % (04/26/19 1334) Vital Signs (24h Range):  Temp:  [98 °F (36.7 °C)-98.7 °F (37.1 °C)] (P) 98 °F (36.7 °C)  Pulse:  [61] 61  Resp:  [18] 18  SpO2:  [100 %] 100 %  BP: (132)/(63) 132/63     Weight: 78.1 kg (172 lb 2.9 oz)  Body mass index is 27.79 kg/m².      Intake/Output Summary (Last 24 hours) at 4/27/2019 0006  Last data filed at 4/26/2019 2333  Gross per 24 hour   Intake 7788 ml   Output 4100 ml   Net 3688 ml       Physical Exam   Constitutional: He is oriented to person, place, and time. He appears well-developed and well-nourished.   HENT:   Head: Normocephalic and atraumatic.   Intubated.  RIJ in place.   Eyes: Right eye exhibits no discharge.  Left eye exhibits no discharge.   Neck: Normal range of motion. Neck supple.   Cardiovascular: Normal rate and regular rhythm.   Pulmonary/Chest: Effort normal. No respiratory distress.   Abdominal:   Non-distended.  Chevron incision present and c/d/i.  2 JANICE drains with SS output.   Musculoskeletal: Normal range of motion.   Neurological: He is alert and oriented to person, place, and time.   Skin: Skin is warm and dry.   Psychiatric: He has a normal mood and affect. His behavior is normal.   Vitals reviewed.      Vents:       Lines/Drains/Airways     Central Venous Catheter Line                 Percutaneous Central Line Insertion/Assessment - triple lumen  04/26/19 1805 less than 1 day         Pulmonary Artery Catheter Assessment  04/26/19 1805 less than 1 day          Drain                 Closed/Suction Drain Right Abdomen Bulb 19 Fr. -- days         Closed/Suction Drain Right Abdomen Bulb 19 Fr. -- days         Urethral Catheter 04/26/19 1756 Non-latex;Straight-tip 16 Fr. less than 1 day          Airway                 Airway - Non-Surgical 04/26/19 1743 Endotracheal Tube less than 1 day          Arterial Line                 Arterial Line 04/26/19 1744 Left Radial less than 1 day          Peripheral Intravenous Line                 Peripheral IV - Single Lumen 04/26/19 1353 18 G Left Forearm less than 1 day         Peripheral IV - Single Lumen 04/26/19 1755 Other (Comments) Left Antecubital less than 1 day                Significant Labs:    CBC/Anemia Profile:  Recent Labs   Lab 04/26/19  1325 04/26/19  1802 04/26/19 2131 04/26/19  2230   WBC 4.65  --   --   --    HGB 14.7 12.1* 10.3* 10.5*   HCT 41.1 33.0* 28.6* 28.5*   PLT 58* 43* 62* 67*   MCV 97  --   --   --    RDW 14.4  --   --   --         Chemistries:  Recent Labs   Lab 04/26/19  1325 04/26/19  1802 04/26/19 2131 04/26/19  2230    135* 134* 135*   K 4.9 3.4* 5.0 3.9     --   --   --    CO2 26  --   --   --    BUN 10  --   --   --     CREATININE 1.0  --   --   --    CALCIUM 9.3  --   --   --    ALBUMIN 3.3* 2.5* 1.8* 2.1*   PROT 7.5  --   --   --    BILITOT 2.6*  --   --   --    ALKPHOS 121  --   --   --    ALT 63*  --  2,549*  --    *  --  3,555*  --    MG 2.0 1.1* 1.8 1.7       Significant Imaging: I have reviewed all pertinent imaging results/findings within the past 24 hours.

## 2019-04-27 NOTE — CONSULTS
[facility]  Endocrinology  Diabetes Consult Note    Consult Requested by: Thien Villanueva MD   Reason for admit: Liver replaced by transplant    HISTORY OF PRESENT ILLNESS:  Reason for Consult: Management of T2DM, Hyperglycemia     Surgical Procedure and Date: Liver Transplant 4/26/19    Diabetes diagnosis year: > 5 years ago  Lab Results   Component Value Date    HGBA1C 6.9 (H) 04/26/2019     Home Diabetes Medications:  Humulin 20 units TID with meals, Lantus 20-25 units q HS    How often checking glucose at home? in the AM/ when he feels bad   BG readings on regimen: 80s-100  Hypoglycemia on the regimen?  No  Missed doses on regimen?  No    Diabetes Complications include:     Hyperglycemia    Complicating diabetes co morbidities:   CIRRHOSIS      HPI:   Patient is a 53 y.o. male with a diagnosis of T2DM, etoh cirrhosis, portal HTN, esophageal varices, and ESLD who presented for liver transplant. Father with DM per chart review.  Endocrinology consulted for management of T2DM/hyperglycemia.                 Interval HPI:   Overnight events: Remains in SICU, post-op day 2. BG within goal ranges on IV intensive insulin infusion. Insulin infusion rate decreased to 8 units/hr this morning. Received Solu Medrol 100 mg IV this am.   Eating:   NPO   Nausea: No  Hypoglycemia and intervention: No  Fever: No  TPN and/or TF: No    PMH, PSH, FH, SH  reviewed     ROS:  Constitutional: Negative for weight changes.  Eyes: Negative for visual disturbance.  Respiratory: Negative for cough.   Cardiovascular: Negative for chest pain.  Gastrointestinal: Negative for nausea.  Endocrine: Negative for polyuria, polydipsia.  Musculoskeletal: Negative for back pain.  Skin: Negative for rash.  Neurological: Negative for syncope.  Psychiatric/Behavioral: Negative for depression.    Current Medications and/or Treatments Impacting Glycemic Control  Immunotherapy:    Immunosuppressants         Stop Route Frequency      mycophenolate mofetil 200 mg/mL suspension 1,000 mg      -- Oral 2 times daily     tacrolimus (PROGRAF) 1 mg/mL oral syringe      -- Oral 2 times daily        Steroids:   Hormones (From admission, onward)    Start     Stop Route Frequency Ordered    05/02/19 0900  predniSONE tablet 20 mg  (methylprednisolone taper panel)      -- Oral Daily 04/27/19 0001    05/01/19 0900  methylPREDNISolone sodium succinate injection 20 mg  (methylprednisolone taper panel)      05/02 0859 IV Every 12 hours 04/27/19 0001    04/30/19 0900  methylPREDNISolone sodium succinate injection 40 mg  (methylprednisolone taper panel)      05/01 0859 IV Every 12 hours 04/27/19 0001    04/29/19 0900  methylPREDNISolone sodium succinate injection 60 mg  (methylprednisolone taper panel)      04/30 0859 IV Every 12 hours 04/27/19 0001    04/28/19 0900  methylPREDNISolone sodium succinate injection 80 mg  (methylprednisolone taper panel)      04/29 0859 IV Every 12 hours 04/27/19 0001    04/27/19 0900  methylPREDNISolone sodium succinate injection 100 mg  (methylprednisolone taper panel)      04/28 0859 IV Every 12 hours 04/27/19 0001        Pressors:    Autonomic Drugs (From admission, onward)    None        Hyperglycemia/Diabetes Medications:   Antihyperglycemics (From admission, onward)    Start     Stop Route Frequency Ordered    04/27/19 0115  insulin regular (Humulin R) 100 Units in sodium chloride 0.9% 100 mL infusion     Question:  Insulin Rate Adjustment (DO NOT MODIFY ANSWER)  Answer:  [email or web address]    -- IV Continuous 04/27/19 0005             PHYSICAL EXAMINATION:  Vitals:    04/27/19 0725   BP:    Pulse: 80   Resp:    Temp:      Body mass index is 30.35 kg/m².    Physical Exam   Constitutional:  Well developed, well nourished, NAD.  ENT: External ears no masses with nose patent; normal hearing.   Neck:  Supple; trachea midline  Cardiovascular: Normal heart sounds, no LE edema.      Lungs:  Normal effort; lungs anterior bilaterally clear to auscultation.  Abdomen:  Soft, no masses, no hernias.  Hypoactive BS noted.  MS: No clubbing or cyanosis of nails noted; normal gait or unable to assess gait.  Skin: No rashes, lesions, or ulcers; no nodules.  Injection sites are ok. No lipo hypertropthy or atrophy. Chevron abdominal incision with dressing; JANICE x 2 to RLQ.  Psychiatric: Good judgement and insight; normal mood and affect.  Neurological: Cranial nerves are grossly intact.  Foot: nails in good condition, no amputations noted.      Labs Reviewed and Include   Recent Labs   Lab 04/27/19  0308 04/27/19  0800   *  --    CALCIUM 8.5*  --    ALBUMIN 2.4*  --    PROT 4.6*  --      --    K 4.3  --    CO2 21*  --      --    BUN 18  --    CREATININE 1.0  --    ALKPHOS 75  --    ALT 2,224*  --    AST 11,714*  11,714* 9,143*   BILITOT 3.1*  --      Lab Results   Component Value Date    WBC 7.30 04/27/2019    HGB 9.7 (L) 04/27/2019    HCT 26.4 (L) 04/27/2019    MCV 89 04/27/2019    PLT 63 (L) 04/27/2019     No results for input(s): TSH, FREET4 in the last 168 hours.  Lab Results   Component Value Date    HGBA1C 6.9 (H) 04/26/2019       Nutritional status:   Body mass index is 30.35 kg/m².  Lab Results   Component Value Date    ALBUMIN 2.4 (L) 04/27/2019    ALBUMIN 2.4 (L) 04/27/2019    ALBUMIN 2.1 (L) 04/26/2019     No results found for: PREALBUMIN    Estimated Creatinine Clearance: 86.5 mL/min (based on SCr of 1 mg/dL).    Accu-Checks  Recent Labs     04/27/19  0155 04/27/19  0314 04/27/19  0402 04/27/19  0500 04/27/19  0602 04/27/19  0713 04/27/19  0756 04/27/19  0912 04/27/19  1010 04/27/19  1117   POCTGLUCOSE 283* 255* 264* 245* 233* 198* 186* 181* 188* 157*        ASSESSMENT and PLAN    * Liver replaced by transplant  Post op day 2  Managed per primary team  Avoid hypoglycemia        Type 2 diabetes mellitus with hyperglycemia  BG goal 140 - 180     Continue IV insulin infusion  protocol  Requires intensive BG monitoring while on protocol     Will start transition IV insulin infusion once diet progresses     ** Please call Endocrine for any BG related issues   ** Please notify Endocrine for any change and/or advance in diet**    Discharge planning: TBD        Prophylactic immunotherapy  May increase insulin requirements      Adverse effect of corticosteroids  Glucocorticoids markedly increase glucoses.   May increase insulin requirements.        Class 1 obesity due to excess calories in adult  May increase insulin resistance.             Plan discussed with patient, family, and RN at bedside.     Anat Leyva NP  Endocrinology  [facility]

## 2019-04-27 NOTE — ASSESSMENT & PLAN NOTE
Liver replaced by transplant  Cesario Bailey is a 53 y.o. male is s/p liver transplant on 4/27.  Pt has hx of alcoholic cirrhosis and HCC s/p tace x2.       Neuro:   -A&O, NAD after extubation  - PRN pain meds     Pulmonary:   -Stable on minimal NC s/p extubation  - Wean NC as tolerated  - IS     Cardiac:   - HDS without pressors  - d/c a line and all CVC except trialysis     Renal:    -  Preop BUN/Cr WNLs  - Monitor UOP     ID:   - Bactrim, Valganciclovir, Amp-Sulbactam  - Prednisone taper, Prograf      Hem/Onc:   - 1 unit intra-op, post-op CBC stable     Endocrine:    - Insulin gtt     Fluids/Electrolytes/Nutrition/GI:   - ADAT after extubation  - leave drains for now  - Replace electrolytes PRN     PPx:  - DVT: Heparin  - Bowel: Protonix     DISPO:  - Continue SICU care for now

## 2019-04-27 NOTE — OP NOTE
Certification of Assistant at Surgery       Surgery Date: 4/26/2019     Participating Surgeons:  Surgeon(s) and Role:     * Thien Villanueva MD - Primary     * Mundo England MD - Assisting    Procedures:  Procedure(s) (LRB):  TRANSPLANT, LIVER (N/A)    Assistant Surgeon's Certification of Necessity:  I understand that section 1842 (b) (6) (d) of the Social Security Act generally prohibits Medicare Part B reasonable charge payment for the services of assistants at surgery in teaching hospitals when qualified residents are available to furnish such services. I certify that the services for which payment is claimed were medically necessary, and that no qualified resident was available to perform the services. I further understand that these services are subject to post-payment review by the Medicare carrier.      Mundo England MD    04/26/2019  10:49 PM

## 2019-04-27 NOTE — TRANSFER OF CARE
"Anesthesia Transfer of Care Note    Patient: Cesario Bailey    Procedure(s) Performed: Procedure(s) (LRB):  TRANSPLANT, LIVER (N/A)    Patient location: ICU    Anesthesia Type: general    Transport from OR: Transported from OR intubated on 100% O2 by AMBU with adequate controlled ventilation. Continuous ECG monitoring in transport. Continuous SpO2 monitoring in transport. Continuos invasive BP monitoring in transport    Post pain: adequate analgesia    Post assessment: no apparent anesthetic complications and tolerated procedure well    Post vital signs: stable    Level of consciousness: sedated    Nausea/Vomiting: no nausea/vomiting    Complications: none    Transfer of care protocol was followed      Last vitals:   Visit Vitals  /63 (BP Location: Left arm, Patient Position: Lying)   Pulse 61   Temp 37.1 °C (98.7 °F) (Oral)   Resp 18   Ht 5' 6" (1.676 m)   Wt 78.1 kg (172 lb 2.9 oz)   SpO2 100%   BMI 27.79 kg/m²     "

## 2019-04-27 NOTE — PROGRESS NOTES
Patient admitted to SICU from OR s/p liver transplant. Patient remains intubated on SIMV FiO2 100%, PEEP 5. Patient arrived with insulin and propofol gtts infusing. Labs drawn and sent, ABG and xray obtained. CTS resident at bedside to assess patient. See Epic flowsheet for vitals and assessment.    Skin note: no pressure related skin breakdown noted.

## 2019-04-27 NOTE — ASSESSMENT & PLAN NOTE
Cesario Bailey is a 53 y.o. male is s/p liver transplant on 4/27.  Pt has hx of alcoholic cirrhosis and HCC s/p tace x2.      Neuro:   -Sedated with propofol     Pulmonary:   -Intubated  -Will get post-op ABG  -Plan on extubating tomorrow    Cardiac:   - HDS without pressors  - Add Levo if needed     Renal:    -  Preop BUN/Cr WNLs  - Monitor UOP    ID:   - Bactrim, Valganciclovir, Amp-Sulbactam  - Prednisone taper, Prograf     Hem/Onc:   - 1 unit intra-op, recheck post-op CBC    Endocrine:    - Insulin gtt    Fluids/Electrolytes/Nutrition/GI:   - Replace electrolytes PRN    PPx:  - DVT: Heparin  - Bowel: Protonix    DISPO:  - Continue SICU care for now.  Plan for extubation in morning.

## 2019-04-27 NOTE — OP NOTE
Operative Report    Date of Procedure: 4/26/2019    Surgeon: Mundo England MD  First Assistant: none    Pre-operative Diagnosis: Allograft liver for transplantation  Post-operative Diagnosis: Same    Procedure(s) Performed:   1. Back Table Preparation of Liver with needle biopsy.    Anesthesia: Not applicable  Estimated Blood Loss: Not applicable  Fluids Administered: Not applicable    Findings: Estimated steatosis 0-10%, normal vascular anatomy.  Drains: Not applicable  Specimens: Core biopsy of allograft liver      Preamble  Indications: This report describes only the backbench preparation of the liver prior to transplantation.  The transplant operation itself is described in a separate report.    ABO Confirmation: Immediately following arrival of the donor organ and prior to implantation, a formal ABO confirmation was done according to hospital and [facility] policies.  I confirmed the [employer] ID number of the donor organ and the donor and recipient ABO types, directly verifying these data by comparison with the [employer] report.  This confirmation was personally done by an attending surgeon and circulating nurse, and is officially documented elsewhere.    Time-Out: A complete time out was carried out prior to the procedure, with confirmation of patient identity, correct procedure, correct operative site, appropriate antibiotic prophylaxis, review of any known allergies, and presence of all needed equipment.    Procedure in Detail  The liver was recovered from the transport cooler and inspected for vascular anatomy and overall suitability for transplantation, with findings as noted above.  The remnant of the diaphragm was dissected away.  The phrenic veins and adrenal vein were identified and ligated or sutured as appropriate.  The portal vein and hepatic artery were mobilized toward the hilum of the liver, and extrahepatic branches were ligated.  No vascular reconstruction was required.  The vessels were tested for  leaks.  A needle biopsy was obtained for permanent section histology using a spring-loaded biopsy device. The liver was kept in ice temperature organ preservation solution until the time of implantation.

## 2019-04-27 NOTE — ASSESSMENT & PLAN NOTE
BG goal 140 - 180     Continue IV insulin infusion protocol  Requires intensive BG monitoring while on protocol     Will start transition IV insulin infusion once diet progresses     ** Please call Endocrine for any BG related issues   ** Please notify Endocrine for any change and/or advance in diet**    Discharge planning: KEENA

## 2019-04-27 NOTE — PROGRESS NOTES
Patient extubated with parameters and placed on 3L NC. Patient tolerated well;sats are 98%.  Will continue to monitor.

## 2019-04-27 NOTE — PLAN OF CARE
Problem: Adult Inpatient Plan of Care  Goal: Plan of Care Review  Outcome: Ongoing (interventions implemented as appropriate)  Patient is intubated on SIMV rate 16, peep 5, fio2 40%. Diprivan infusing for sedation. Prn fentanyl for pain. Opens eyes and follows commands, NAGY.  RAMONE. Pulses intact. No significant edema.  Hemodynamically stable. SR. Tmax 99.9. CVP 14-8-5-4. 500 ml Albumin given x1. SVO2 this AM 77.  Abdomen is rounded but soft. OG tube to LIS. BS hypoactive. Chevron incision is intact with small amount of sanguinous drainage.  2 JANICE drains with sanguinous output. JANICE 1: 282 ml, JP2: 154ml. AST still increasing, >11,000 this AM. MD aware.  Urine output  ml/hr.   Bruising to skin. No breakdown noted.  Monitor vital signs. Monitor incisions, dressings, drains and output. Monitor labs and renal function and replace as appropriate. Control pain and provide comfort. Protect skin and prevent breakdown. Anticipate renal U/S. Wean to extubate per MD.

## 2019-04-27 NOTE — PLAN OF CARE
Problem: Adult Inpatient Plan of Care  Goal: Patient-Specific Goal (Individualization)  PMHx: ETOH cirrhosis, HCC, portal HTN, esophageal varices, DM, ascites, pleural effusions, encephalopathy, Umbilical hernia repair w/MESH    4/25/19: liver tx OR: 1 platelet, 4 FFP, 1 cryo, 500 Albumin, 4L crystalloid, 1 u PRBC  4/26/19: SICU from OR; 500 ml Albumin     Nursing:  q 1 glucose  q 4 INR, CBC, AST   Outcome: Ongoing (interventions implemented as appropriate)  POC reviewed with Cesario Bailey and spouse at 1700. Pt verbalized understanding. Questions and concerns addressed using Japanese speaking RN. No acute events today. Pt progressing toward goals. Pt extubated at 1000 and is on RA. Pt de-lined with no complications. Diet advanced to sugar free clear liquid. Tolerating well. Insulin drip continued. D5 1/2 NS continued. Hydralazine given x 3. Pain adequately controlled. Draper continued. Will continue to monitor. See flowsheets for full assessment and VS info.

## 2019-04-27 NOTE — ANESTHESIA PROCEDURE NOTES
Arterial    Diagnosis: olt  Doctor requesting consult: brandon    Patient location during procedure: done in OR  Procedure start time: 4/26/2019 6:05 PM  Timeout: 4/26/2019 5:55 PM  Procedure end time: 4/26/2019 6:05 PM  Staffing  Anesthesiologist: Dominique Velazquez MD  Performed: anesthesiologist   Anesthesiologist was present at the time of the procedure.  Preanesthetic Checklist  Completed: patient identified, site marked, surgical consent, pre-op evaluation, timeout performed, IV checked, risks and benefits discussed, monitors and equipment checked and anesthesia consent givenArterial  Skin Prep: chlorhexidine gluconate  Orientation: right  Location: femoral  Catheter Size: 20 G  Catheter placement by Ultrasound guidance. Heme positive aspiration all ports.  Vessel Caliber: medium, patent, compressibility normal  Vascular Doppler:  not done  Needle advanced into vessel with real time Ultrasound guidance.  Guidewire confirmed in vessel.  Sterile sheath used.Insertion Attempts: 1  Assessment  Dressing: sutured in place and taped and secured with tape

## 2019-04-28 PROBLEM — K72.90 LIVER FAILURE: Status: ACTIVE | Noted: 2019-04-28

## 2019-04-28 LAB
ALBUMIN SERPL BCP-MCNC: 2.9 G/DL (ref 3.5–5.2)
ALP SERPL-CCNC: 52 U/L (ref 55–135)
ALT SERPL W/O P-5'-P-CCNC: 1219 U/L (ref 10–44)
ANION GAP SERPL CALC-SCNC: 7 MMOL/L (ref 8–16)
APTT BLDCRRT: 26.3 SEC (ref 21–32)
AST SERPL-CCNC: 1212 U/L (ref 10–40)
AST SERPL-CCNC: 1398 U/L (ref 10–40)
BASOPHILS # BLD AUTO: 0.01 K/UL (ref 0–0.2)
BASOPHILS # BLD AUTO: 0.03 K/UL (ref 0–0.2)
BASOPHILS NFR BLD: 0.1 % (ref 0–1.9)
BASOPHILS NFR BLD: 0.4 % (ref 0–1.9)
BILIRUB DIRECT SERPL-MCNC: 1 MG/DL (ref 0.1–0.3)
BILIRUB SERPL-MCNC: 1.6 MG/DL (ref 0.1–1)
BUN SERPL-MCNC: 22 MG/DL (ref 6–20)
CALCIUM SERPL-MCNC: 8 MG/DL (ref 8.7–10.5)
CHLORIDE SERPL-SCNC: 109 MMOL/L (ref 95–110)
CO2 SERPL-SCNC: 22 MMOL/L (ref 23–29)
CREAT SERPL-MCNC: 1 MG/DL (ref 0.5–1.4)
DIFFERENTIAL METHOD: ABNORMAL
DIFFERENTIAL METHOD: ABNORMAL
EOSINOPHIL # BLD AUTO: 0.1 K/UL (ref 0–0.5)
EOSINOPHIL # BLD AUTO: 0.4 K/UL (ref 0–0.5)
EOSINOPHIL NFR BLD: 1 % (ref 0–8)
EOSINOPHIL NFR BLD: 6.5 % (ref 0–8)
ERYTHROCYTE [DISTWIDTH] IN BLOOD BY AUTOMATED COUNT: 14.9 % (ref 11.5–14.5)
ERYTHROCYTE [DISTWIDTH] IN BLOOD BY AUTOMATED COUNT: 14.9 % (ref 11.5–14.5)
EST. GFR  (AFRICAN AMERICAN): >60 ML/MIN/1.73 M^2
EST. GFR  (NON AFRICAN AMERICAN): >60 ML/MIN/1.73 M^2
GGT SERPL-CCNC: 263 U/L (ref 8–55)
GLUCOSE SERPL-MCNC: 95 MG/DL (ref 70–110)
HCT VFR BLD AUTO: 25.1 % (ref 40–54)
HCT VFR BLD AUTO: 25.3 % (ref 40–54)
HGB BLD-MCNC: 8.7 G/DL (ref 14–18)
HGB BLD-MCNC: 8.9 G/DL (ref 14–18)
IMM GRANULOCYTES # BLD AUTO: 0.02 K/UL (ref 0–0.04)
IMM GRANULOCYTES # BLD AUTO: 0.07 K/UL (ref 0–0.04)
IMM GRANULOCYTES NFR BLD AUTO: 0.3 % (ref 0–0.5)
IMM GRANULOCYTES NFR BLD AUTO: 1 % (ref 0–0.5)
INR PPP: 1.2 (ref 0.8–1.2)
INR PPP: 1.3 (ref 0.8–1.2)
LYMPHOCYTES # BLD AUTO: 0.1 K/UL (ref 1–4.8)
LYMPHOCYTES # BLD AUTO: 0.1 K/UL (ref 1–4.8)
LYMPHOCYTES NFR BLD: 1.6 % (ref 18–48)
LYMPHOCYTES NFR BLD: 2.1 % (ref 18–48)
MCH RBC QN AUTO: 32.6 PG (ref 27–31)
MCH RBC QN AUTO: 32.8 PG (ref 27–31)
MCHC RBC AUTO-ENTMCNC: 34.7 G/DL (ref 32–36)
MCHC RBC AUTO-ENTMCNC: 35.2 G/DL (ref 32–36)
MCV RBC AUTO: 93 FL (ref 82–98)
MCV RBC AUTO: 94 FL (ref 82–98)
MONOCYTES # BLD AUTO: 0.1 K/UL (ref 0.3–1)
MONOCYTES # BLD AUTO: 0.2 K/UL (ref 0.3–1)
MONOCYTES NFR BLD: 1.9 % (ref 4–15)
MONOCYTES NFR BLD: 2.5 % (ref 4–15)
NEUTROPHILS # BLD AUTO: 6 K/UL (ref 1.8–7.7)
NEUTROPHILS # BLD AUTO: 6.5 K/UL (ref 1.8–7.7)
NEUTROPHILS NFR BLD: 88.2 % (ref 38–73)
NEUTROPHILS NFR BLD: 94.4 % (ref 38–73)
NRBC BLD-RTO: 0 /100 WBC
NRBC BLD-RTO: 0 /100 WBC
PLATELET # BLD AUTO: 34 K/UL (ref 150–350)
PLATELET # BLD AUTO: 45 K/UL (ref 150–350)
PLATELET BLD QL SMEAR: ABNORMAL
PMV BLD AUTO: 10.9 FL (ref 9.2–12.9)
PMV BLD AUTO: 11.1 FL (ref 9.2–12.9)
POCT GLUCOSE: 259 MG/DL (ref 70–110)
POCT GLUCOSE: 360 MG/DL (ref 70–110)
POCT GLUCOSE: 388 MG/DL (ref 70–110)
POCT GLUCOSE: 395 MG/DL (ref 70–110)
POCT GLUCOSE: 427 MG/DL (ref 70–110)
POCT GLUCOSE: 433 MG/DL (ref 70–110)
POCT GLUCOSE: 438 MG/DL (ref 70–110)
POCT GLUCOSE: 441 MG/DL (ref 70–110)
POCT GLUCOSE: 95 MG/DL (ref 70–110)
POTASSIUM SERPL-SCNC: 3.8 MMOL/L (ref 3.5–5.1)
POTASSIUM SERPL-SCNC: 5 MMOL/L (ref 3.5–5.1)
PROT SERPL-MCNC: 4.9 G/DL (ref 6–8.4)
PROTHROMBIN TIME: 12.4 SEC (ref 9–12.5)
PROTHROMBIN TIME: 12.6 SEC (ref 9–12.5)
RBC # BLD AUTO: 2.67 M/UL (ref 4.6–6.2)
RBC # BLD AUTO: 2.71 M/UL (ref 4.6–6.2)
SODIUM SERPL-SCNC: 138 MMOL/L (ref 136–145)
TACROLIMUS BLD-MCNC: <1.5 NG/ML (ref 5–15)
WBC # BLD AUTO: 6.82 K/UL (ref 3.9–12.7)
WBC # BLD AUTO: 6.93 K/UL (ref 3.9–12.7)

## 2019-04-28 PROCEDURE — 25000003 PHARM REV CODE 250: Performed by: SURGERY

## 2019-04-28 PROCEDURE — 20000000 HC ICU ROOM

## 2019-04-28 PROCEDURE — 82248 BILIRUBIN DIRECT: CPT

## 2019-04-28 PROCEDURE — 99231 PR SUBSEQUENT HOSPITAL CARE,LEVL I: ICD-10-PCS | Mod: 24,,, | Performed by: SURGERY

## 2019-04-28 PROCEDURE — 25000003 PHARM REV CODE 250: Performed by: STUDENT IN AN ORGANIZED HEALTH CARE EDUCATION/TRAINING PROGRAM

## 2019-04-28 PROCEDURE — 63600175 PHARM REV CODE 636 W HCPCS: Performed by: TRANSPLANT SURGERY

## 2019-04-28 PROCEDURE — 85025 COMPLETE CBC W/AUTO DIFF WBC: CPT

## 2019-04-28 PROCEDURE — 63600175 PHARM REV CODE 636 W HCPCS: Performed by: NURSE PRACTITIONER

## 2019-04-28 PROCEDURE — 99232 SBSQ HOSP IP/OBS MODERATE 35: CPT | Mod: ,,, | Performed by: NURSE PRACTITIONER

## 2019-04-28 PROCEDURE — S0028 INJECTION, FAMOTIDINE, 20 MG: HCPCS | Performed by: TRANSPLANT SURGERY

## 2019-04-28 PROCEDURE — 63600175 PHARM REV CODE 636 W HCPCS: Performed by: HOSPITALIST

## 2019-04-28 PROCEDURE — 82977 ASSAY OF GGT: CPT

## 2019-04-28 PROCEDURE — 80197 ASSAY OF TACROLIMUS: CPT

## 2019-04-28 PROCEDURE — 25000003 PHARM REV CODE 250: Performed by: TRANSPLANT SURGERY

## 2019-04-28 PROCEDURE — 63600175 PHARM REV CODE 636 W HCPCS: Performed by: STUDENT IN AN ORGANIZED HEALTH CARE EDUCATION/TRAINING PROGRAM

## 2019-04-28 PROCEDURE — 94799 UNLISTED PULMONARY SVC/PX: CPT

## 2019-04-28 PROCEDURE — 63600175 PHARM REV CODE 636 W HCPCS: Performed by: SURGERY

## 2019-04-28 PROCEDURE — 94761 N-INVAS EAR/PLS OXIMETRY MLT: CPT

## 2019-04-28 PROCEDURE — 99231 SBSQ HOSP IP/OBS SF/LOW 25: CPT | Mod: 24,,, | Performed by: SURGERY

## 2019-04-28 PROCEDURE — 25000003 PHARM REV CODE 250: Performed by: HOSPITALIST

## 2019-04-28 PROCEDURE — 85610 PROTHROMBIN TIME: CPT

## 2019-04-28 PROCEDURE — 99232 PR SUBSEQUENT HOSPITAL CARE,LEVL II: ICD-10-PCS | Mod: ,,, | Performed by: NURSE PRACTITIONER

## 2019-04-28 PROCEDURE — 80053 COMPREHEN METABOLIC PANEL: CPT

## 2019-04-28 PROCEDURE — 84132 ASSAY OF SERUM POTASSIUM: CPT

## 2019-04-28 PROCEDURE — 85730 THROMBOPLASTIN TIME PARTIAL: CPT

## 2019-04-28 RX ORDER — FUROSEMIDE 40 MG/1
40 TABLET ORAL DAILY
Status: DISCONTINUED | OUTPATIENT
Start: 2019-04-28 | End: 2019-05-04

## 2019-04-28 RX ORDER — POTASSIUM CHLORIDE 20 MEQ/15ML
40 SOLUTION ORAL ONCE
Status: COMPLETED | OUTPATIENT
Start: 2019-04-28 | End: 2019-04-28

## 2019-04-28 RX ORDER — HYDROMORPHONE HYDROCHLORIDE 1 MG/ML
0.5 INJECTION, SOLUTION INTRAMUSCULAR; INTRAVENOUS; SUBCUTANEOUS ONCE
Status: COMPLETED | OUTPATIENT
Start: 2019-04-28 | End: 2019-04-28

## 2019-04-28 RX ORDER — SODIUM CHLORIDE, SODIUM LACTATE, POTASSIUM CHLORIDE, CALCIUM CHLORIDE 600; 310; 30; 20 MG/100ML; MG/100ML; MG/100ML; MG/100ML
INJECTION, SOLUTION INTRAVENOUS CONTINUOUS
Status: DISCONTINUED | OUTPATIENT
Start: 2019-04-28 | End: 2019-04-29

## 2019-04-28 RX ADMIN — OXYCODONE HYDROCHLORIDE 10 MG: 10 TABLET ORAL at 04:04

## 2019-04-28 RX ADMIN — FAMOTIDINE 20 MG: 10 INJECTION, SOLUTION INTRAVENOUS at 08:04

## 2019-04-28 RX ADMIN — INSULIN ASPART 5 UNITS: 100 INJECTION, SOLUTION INTRAVENOUS; SUBCUTANEOUS at 12:04

## 2019-04-28 RX ADMIN — INSULIN ASPART 5 UNITS: 100 INJECTION, SOLUTION INTRAVENOUS; SUBCUTANEOUS at 09:04

## 2019-04-28 RX ADMIN — Medication 1 MG: at 08:04

## 2019-04-28 RX ADMIN — Medication 1000 MG: at 09:04

## 2019-04-28 RX ADMIN — INSULIN ASPART 5 UNITS: 100 INJECTION, SOLUTION INTRAVENOUS; SUBCUTANEOUS at 06:04

## 2019-04-28 RX ADMIN — SODIUM CHLORIDE, SODIUM LACTATE, POTASSIUM CHLORIDE, AND CALCIUM CHLORIDE: .6; .31; .03; .02 INJECTION, SOLUTION INTRAVENOUS at 11:04

## 2019-04-28 RX ADMIN — INSULIN ASPART 6 UNITS: 100 INJECTION, SOLUTION INTRAVENOUS; SUBCUTANEOUS at 06:04

## 2019-04-28 RX ADMIN — HEPARIN SODIUM 5000 UNITS: 5000 INJECTION, SOLUTION INTRAVENOUS; SUBCUTANEOUS at 05:04

## 2019-04-28 RX ADMIN — HYDROMORPHONE HYDROCHLORIDE 0.5 MG: 1 INJECTION, SOLUTION INTRAMUSCULAR; INTRAVENOUS; SUBCUTANEOUS at 04:04

## 2019-04-28 RX ADMIN — SULFAMETHOXAZOLE AND TRIMETHOPRIM 1 TABLET: 400; 80 TABLET ORAL at 08:04

## 2019-04-28 RX ADMIN — OXYCODONE HYDROCHLORIDE 10 MG: 10 TABLET ORAL at 12:04

## 2019-04-28 RX ADMIN — HEPARIN SODIUM 5000 UNITS: 5000 INJECTION, SOLUTION INTRAVENOUS; SUBCUTANEOUS at 01:04

## 2019-04-28 RX ADMIN — INSULIN ASPART 3 UNITS: 100 INJECTION, SOLUTION INTRAVENOUS; SUBCUTANEOUS at 08:04

## 2019-04-28 RX ADMIN — OXYCODONE HYDROCHLORIDE 10 MG: 10 TABLET ORAL at 05:04

## 2019-04-28 RX ADMIN — NYSTATIN 500000 UNITS: 500000 SUSPENSION ORAL at 01:04

## 2019-04-28 RX ADMIN — METHYLPREDNISOLONE SODIUM SUCCINATE 80 MG: 125 INJECTION, POWDER, FOR SOLUTION INTRAMUSCULAR; INTRAVENOUS at 08:04

## 2019-04-28 RX ADMIN — OXYCODONE HYDROCHLORIDE 10 MG: 10 TABLET ORAL at 10:04

## 2019-04-28 RX ADMIN — HEPARIN SODIUM 5000 UNITS: 5000 INJECTION, SOLUTION INTRAVENOUS; SUBCUTANEOUS at 09:04

## 2019-04-28 RX ADMIN — SODIUM CHLORIDE 4 UNITS/HR: 9 INJECTION, SOLUTION INTRAVENOUS at 09:04

## 2019-04-28 RX ADMIN — INSULIN ASPART 6 UNITS: 100 INJECTION, SOLUTION INTRAVENOUS; SUBCUTANEOUS at 12:04

## 2019-04-28 RX ADMIN — NYSTATIN 500000 UNITS: 500000 SUSPENSION ORAL at 08:04

## 2019-04-28 RX ADMIN — NYSTATIN 500000 UNITS: 500000 SUSPENSION ORAL at 09:04

## 2019-04-28 RX ADMIN — INSULIN ASPART 4 UNITS: 100 INJECTION, SOLUTION INTRAVENOUS; SUBCUTANEOUS at 08:04

## 2019-04-28 RX ADMIN — FUROSEMIDE 40 MG: 40 TABLET ORAL at 09:04

## 2019-04-28 RX ADMIN — OXYCODONE HYDROCHLORIDE 10 MG: 10 TABLET ORAL at 01:04

## 2019-04-28 RX ADMIN — Medication 3 MG: at 05:04

## 2019-04-28 RX ADMIN — POTASSIUM CHLORIDE 40 MEQ: 20 SOLUTION ORAL at 04:04

## 2019-04-28 NOTE — NURSING
Dr. Sneed made aware of JANICE drain drainage becoming increasingly sanguinous post removal of 1st JANICE drain. CBC ordered and collected. Will continue to monitor.

## 2019-04-28 NOTE — PLAN OF CARE
Problem: Fall Injury Risk  Goal: Absence of Fall and Fall-Related Injury  Outcome: Ongoing (interventions implemented as appropriate)  .    Problem: Infection  Goal: Infection Symptom Resolution  Outcome: Ongoing (interventions implemented as appropriate)  .    Problem: Skin Injury Risk Increased  Goal: Skin Health and Integrity  Outcome: Ongoing (interventions implemented as appropriate)  .    Problem: Diabetes Comorbidity  Goal: Blood Glucose Level Within Desired Range  Outcome: Ongoing (interventions implemented as appropriate)  .    Problem: Adjustment to Transplant (Liver Transplant)  Goal: Optimal Coping with Organ Transplant  Outcome: Ongoing (interventions implemented as appropriate)  .    Problem: Bleeding (Liver Transplant)  Goal: Absence of Bleeding  Outcome: Ongoing (interventions implemented as appropriate)  .    Problem: Bowel Elimination Impaired (Liver Transplant)  Goal: Effective Bowel Elimination  Outcome: Ongoing (interventions implemented as appropriate)  .    Problem: Fluid Imbalance (Liver Transplant)  Goal: Fluid Balance  Outcome: Ongoing (interventions implemented as appropriate)  .    Problem: Infection (Liver Transplant)  Goal: Absence of Infection Signs/Symptoms  Outcome: Ongoing (interventions implemented as appropriate)  .    Problem: Neurologic Impairment (Liver Transplant)  Goal: Optimal Neurologic Function  Outcome: Ongoing (interventions implemented as appropriate)  .    Problem: Ongoing Anesthesia Effects (Liver Transplant)  Goal: Anesthesia/Sedation Recovery  Outcome: Ongoing (interventions implemented as appropriate)  .    Problem: Oral Intake Inadequate (Liver Transplant)  Goal: Optimal Nutrition Intake  Outcome: Ongoing (interventions implemented as appropriate)  .    Problem: Pain (Liver Transplant)  Goal: Acceptable Pain Control  Outcome: Ongoing (interventions implemented as appropriate)  .    Problem: Postoperative Nausea and Vomiting (Liver Transplant)  Goal: Nausea and  Vomiting Relief  Outcome: Ongoing (interventions implemented as appropriate)  .    Problem: Postoperative Urinary Retention (Liver Transplant)  Goal: Effective Urine Elimination  Outcome: Ongoing (interventions implemented as appropriate)  .    Problem: Respiratory Compromise (Liver Transplant)  Goal: Effective Oxygenation and Ventilation  Outcome: Ongoing (interventions implemented as appropriate)  .

## 2019-04-28 NOTE — ASSESSMENT & PLAN NOTE
Liver replaced by transplant  Cesario Bailey is a 53 y.o. male is s/p liver transplant on 4/27.  Pt has hx of alcoholic cirrhosis and HCC s/p tace x2.       Neuro:   -A&O, NAD   - PRN pain meds     Pulmonary:   -Stable on minimal NC s/p extubation  - Wean NC as tolerated  - IS     Cardiac:   - HDS without pressors  - keep trialysis     Renal:    -  Preop BUN/Cr WNLs  - Monitor UOP  - Lasix PO today     ID:   - Bactrim, Valganciclovir, Amp-Sulbactam  - Prednisone taper, Prograf      Hem/Onc:   - 1 unit intra-op, post-op CBC stable     Endocrine:    - Insulin gtt     Fluids/Electrolytes/Nutrition/GI:   - Regular diet  - lateral drain pulled this morning   - Replace electrolytes PRN     PPx:  - DVT: Heparin  - Bowel: Protonix     DISPO:  - TTF

## 2019-04-28 NOTE — CONSULTS
"  [facility]  Adult Nutrition  Consult Note    SUMMARY     Recommendations    Recommendation/Intervention:   1. Advance diet as tolerated to Regular diet.   2. RD to provide post-tx diet education on follow-up. Will monitor.   Goals: Pt to meet % EEN and EPN.   Nutrition Goal Status: new  Communication of RD Recs: reviewed with RN    Reason for Assessment    Reason For Assessment: consult  Diagnosis: transplant/postoperative complications(s/p liver tx 4/27)  Relevant Medical History: ETOH cirrhosis, ESLD  General Information Comments: Pt on clears this AM, consumed 100% per RN. MD in room during visit, unable to clarify wt status or PO intake pta. Per chart review, pt appears with some wt gain, but otherwise stable wt. Will continue to assess for malnutrition. Will provide post-tx education on follow-up.   Nutrition Discharge Planning: Pt needs post-tx diet education on follow-up.     Nutrition/Diet History    Spiritual, Cultural Beliefs, Synagogue Practices, Values that Affect Care: no  Factors Affecting Nutritional Intake: clear liquid diet    Anthropometrics    Temp: 98.9 °F (37.2 °C)  Height Method: Stated  Height: 5' 6" (167.6 cm)  Height (inches): 66 in  Weight Method: Bed Scale  Weight: 87.2 kg (192 lb 3.9 oz)  Weight (lb): 192.24 lb  Ideal Body Weight (IBW), Male: 142 lb  % Ideal Body Weight, Male (lb): 121.25   BMI (Calculated): 27.8  BMI Grade: 25 - 29.9 - overweight     Lab/Procedures/Meds    Pertinent Labs Reviewed: reviewed  Pertinent Labs Comments: BUN 22, Ca 8  Pertinent Medications Reviewed: reviewed  Pertinent Medications Comments: famotidine, insulin, methylprednisolone, prednisone, prograf    Estimated/Assessed Needs    Weight Used For Calorie Calculations: 87.2 kg (192 lb 3.9 oz)  Energy Calorie Requirements (kcal): 2067  Energy Need Method: [place]-St Jeor(1.25 PAL)  Protein Requirements: 87-104g (1-1.2g/kg)  Weight Used For Protein Calculations: 87.2 kg (192 lb 3.9 " oz)  Fluid Requirements (mL): Per MD  RDA Method (mL): 2067     Nutrition Prescription Ordered    Current Diet Order: Clear Liquid, Bariatric    Evaluation of Received Nutrient/Fluid Intake    I/O: +5.9L  % Intake of Estimated Energy Needs: Other: 100% clears this AM  % Meal Intake: Other: 100% clears this AM    Nutrition Risk    Level of Risk/Frequency of Follow-up: (2X/week)     Assessment and Plan     Nutrition Problem  Increased energy needs    Related to (etiology):   healing    Signs and Symptoms (as evidenced by):   Post-liver tx     Interventions/Recommendations (treatment strategy):  Collaboration of care    Nutrition Diagnosis Status:   New    Monitor and Evaluation    Food and Nutrient Intake: energy intake, food and beverage intake  Food and Nutrient Adminstration: diet order  Knowledge/Beliefs/Attitudes: food and nutrition knowledge/skill  Anthropometric Measurements: weight, weight change  Biochemical Data, Medical Tests and Procedures: other (specify)(All labs)  Nutrition-Focused Physical Findings: overall appearance     Nutrition Follow-Up    RD Follow-up?: Yes

## 2019-04-28 NOTE — PROGRESS NOTES
[facility]  Liver Transplant  Progress Note    Patient Name: Cesario Bailey  MRN: [id number]  Admission Date: 2019  Hospital Length of Stay: 2 days  Code Status: Full Code  Primary Care Provider: Primary Doctor No  Post-Op Day 2 Days Post-Op    Admit Diagnosis: HCC EtOH cirrhosis   Procedures: OLT     ORGAN:   LIVER  Disease Etiology: Primary Liver Malignancy: Hepatoma (HCC) and Cirrhosis  Donor Type:    - Brain Death  CDC High Risk:   No  Donor CMV Status:   Donor CMV Status: Negative  Donor HBcAB:   Negative  Donor HCV Status:   Negative  Donor HBV MARIA R: Negative  Donor HCV MARIA R: Negative  Whole or Partial: Whole Liver  Biliary Anastomosis: End to End  Arterial Anatomy: Standard    Subjective:     Interval:  Doing well this morning without complaints  Drain output wnl SS  LFTs trending down    Scheduled Meds:   famotidine (PF)  20 mg Intravenous Q12H    furosemide  40 mg Oral Daily    heparin (porcine)  5,000 Units Subcutaneous Q8H    insulin aspart U-100  4-6 Units Subcutaneous TIDWM    methylPREDNISolone sodium succinate  80 mg Intravenous Q12H    Followed by    [START ON 2019] methylPREDNISolone sodium succinate  60 mg Intravenous Q12H    Followed by    [START ON 2019] methylPREDNISolone sodium succinate  40 mg Intravenous Q12H    Followed by    [START ON 2019] methylPREDNISolone sodium succinate  20 mg Intravenous Q12H    Followed by    [START ON 2019] predniSONE  20 mg Oral Daily    mycophenolate mofetil  1,000 mg Oral BID    nystatin  500,000 Units Mouth/Throat TID PC    sulfamethoxazole-trimethoprim 400-80mg  1 tablet Oral Daily    tacrolimus  3 mg Oral BID    [START ON 2019] valGANciclovir  450 mg Oral Daily     Continuous Infusions:   dextrose 5 % and 0.45 % NaCl 100 mL/hr at 19 0900    insulin (HUMAN R) infusion (adults) Stopped (19 0200)    propofol Stopped (19 09)     PRN Meds:sodium chloride, sodium chloride,  dextrose 50%, dextrose 50%, dextrose 50%, dextrose 50%, fentaNYL, glucagon (human recombinant), glucose, glucose, hydrALAZINE, insulin aspart U-100, oxyCODONE, oxyCODONE    Review of Systems     Otherwise negative   Objective:     Vital Signs (Most Recent):  Temp: 98.9 °F (37.2 °C) (04/28/19 0700)  Pulse: 97 (04/28/19 0915)  Resp: (!) 21 (04/28/19 0915)  BP: 139/75 (04/28/19 0900)  SpO2: 99 % (04/28/19 0915) Vital Signs (24h Range):  Temp:  [98.3 °F (36.8 °C)-99.5 °F (37.5 °C)] 98.9 °F (37.2 °C)  Pulse:  [] 97  Resp:  [14-32] 21  SpO2:  [93 %-100 %] 99 %  BP: (134-175)/(68-87) 139/75  Arterial Line BP: (146-194)/(59-83) 148/63     Weight: 87.2 kg (192 lb 3.9 oz)  Body mass index is 31.03 kg/m².    Intake/Output - Last 3 Shifts       04/26 0700 - 04/27 0659 04/27 0700 - 04/28 0659 04/28 0700 - 04/29 0659    I.V. (mL/kg) 7061.3 (82.8) 2781.2 (31.9)     Blood 2288      NG/GT  100     IV Piggyback 250 150     Total Intake(mL/kg) 9599.3 (112.5) 3031.2 (34.8)     Urine (mL/kg/hr) 3085 885 (0.4) 155 (0.6)    Drains 486 396 100    Stool  0     Blood 1800      Total Output 5371 1281 255    Net +4228.3 +1750.2 -255           Stool Occurrence  0 x           Physical Exam    A&O, NAD  RRR  No Resp Distress on NC post extubation   ABD: mildly distended, drains with SS output, AppTTP    Laboratory:  Immunosuppressants         Stop Route Frequency     tacrolimus (PROGRAF) 1 mg/mL oral syringe      -- Oral 2 times daily     mycophenolate mofetil 200 mg/mL suspension 1,000 mg      -- Oral 2 times daily        CBC:   Recent Labs   Lab 04/28/19 0305   WBC 6.82   RBC 2.71*   HGB 8.9*   HCT 25.3*   PLT 45*   MCV 93   MCH 32.8*   MCHC 35.2     CMP:   Recent Labs   Lab 04/28/19 0305   GLU 95   CALCIUM 8.0*   ALBUMIN 2.9*   PROT 4.9*      K 3.8   CO2 22*      BUN 22*   CREATININE 1.0   ALKPHOS 52*   ALT 1,219*   AST 1,212*   BILITOT 1.6*     Coagulation:   Recent Labs   Lab 04/28/19 0305   INR 1.2   APTT 26.3        Diagnostic Results:  US Liver Transplant Post:    Results for orders placed during the hospital encounter of 04/26/19   US Liver Transplant Post    Narrative EXAMINATION:  US LIVER TRANSPLANT POST    CLINICAL HISTORY:  Assess perfusion following liver transplant;    TECHNIQUE:  Limited abdominal ultrasound of the transplant liver with Doppler evaluation.  Color and spectral Doppler were performed.    COMPARISON:  None.    FINDINGS:  Patient is status post orthotopic liver transplantday 1.  Liver allograft is normal in size.  The liver demonstrates mild diffusely increased echogenicity which can be seen with fatty infiltration.  No focal hepatic lesions are seen.  There is a 9.8 x 7.3 x 1.8 cm fluid collection posterior to the right hepatic lobe.  There is also a small amount of free fluid.    The common duct is not dilated, measuring 5 mm.  No dilated intrahepatic radicles are seen.    Color flow and spectral waveform analysis was performed.  The main portal vein, right portal vein, left portal vein, middle hepatic vein, right hepatic vein, left hepatic vein, and IVC are patent with proper directional flow.    Anastomosis site of the main hepatic artery demonstrates a peak systolic velocity 101 cm/sec.    Main hepatic artery demonstrates resistive index 0.81-0.87 with normal waveform    Left hepatic artery shows resistive index 0.86-1 with normal waveform.    Anterior branch of the right hepatic artery demonstrates resistive index 1 with normal waveform.    Posterior branch of the right hepatic artery demonstrates resistive index 1 with normal waveform.      Impression Day 1 liver transplant with small amount of scattered free fluid, 1 focal fluid collection, and increased echogenicity of the hepatic parenchyma suggesting fatty infiltration of the donor liver.  The resistive indices in the hepatic arteries are elevated, as high as 1, throughout the liver.  Follow-up ultrasound is recommended as this can be a  negative prognostic sign of the day 1 ultrasound.    COMMUNICATION  This critical result was discovered/received at 0855.  The critical information above was relayed directly by me by telephone to Dr. Levy On April 27, 2019 at 0905.      Electronically signed by: Yael Smith MD  Date:    04/27/2019  Time:    09:08     Assessment/Plan:   Cesario Bailey is a 53 y.o. male     GI  * Liver replaced by transplant  Liver replaced by transplant  Cesario Bailey is a 53 y.o. male is s/p liver transplant on 4/27.  Pt has hx of alcoholic cirrhosis and HCC s/p tace x2.       Neuro:   -A&O, NAD   - PRN pain meds     Pulmonary:   -Stable on minimal NC s/p extubation  - Wean NC as tolerated  - IS     Cardiac:   - HDS without pressors  - keep trialysis     Renal:    -  Preop BUN/Cr WNLs  - Monitor UOP  - Lasix PO today     ID:   - Bactrim, Valganciclovir, Amp-Sulbactam  - Prednisone taper, Prograf      Hem/Onc:   - 1 unit intra-op, post-op CBC stable     Endocrine:    - Insulin gtt     Fluids/Electrolytes/Nutrition/GI:   - Regular diet  - lateral drain pulled this morning   - Replace electrolytes PRN     PPx:  - DVT: Heparin  - Bowel: Protonix     DISPO:  - TTF              The patients clinical status was discussed at multidisplinary rounds, involving transplant surgery, transplant medicine, pharmacy, nursing, nutrition, and social work.    Mundo Sneed MD  Liver Transplant  [facility]

## 2019-04-28 NOTE — ASSESSMENT & PLAN NOTE
BG goal 140 - 180     Transition IV insulin infusion at 1.4 units/hr - (weight based using 0.8 modifier)   Novolog 4-6 units TID with meals (weight based using 0.5 modifier)  Low dose correction scale  BG monitoring AC/HS/0200    ** Please call Endocrine for any BG related issues   ** Please notify Endocrine for any change and/or advance in diet**    Discharge planning: TBD

## 2019-04-28 NOTE — PROGRESS NOTES
Subjective/Objective  Follow-up For: Procedure(s) (LRB):  TRANSPLANT, LIVER (N/A)    Post-Operative Day: 2 Day Post-Op    Interval Events: Extubated yesterday. Given Albumin x 1 for low UOP, with some improvement. Insulin gtt weaned off.       Past Medical History:   Diagnosis Date    Alcoholic cirrhosis     Diabetes mellitus     Encounter for blood transfusion     HCC (hepatocellular carcinoma)     Portal hypertension        Past Surgical History:   Procedure Laterality Date    COLONOSCOPY N/A 7/23/2018    Performed by Romain G[facility]ora [facility] at [facility] END[facility] (4TH FLR)    EMBOLIZATION      ESOPHAGOGASTRODUODENOSCOPY (EGD) N/A 7/23/2018    Performed by Romain Gongora MD at [facility] ENDO (4TH FLR)    HERNIA REPAIR      umbilical hernia repair, large mesh placed.        Review of patient's allergies indicates:   Allergen Reactions    Aspirin        Family History     Problem Relation (Age of Onset)    Diabetes Father    Heart disease Mother    Hyperlipidemia Mother    Hypertension Mother    Stroke Mother        Tobacco Use    Smoking status: Never Smoker    Smokeless tobacco: Never Used   Substance and Sexual Activity    Alcohol use: Never     Frequency: Never    Drug use: Never    Sexual activity: Not on file      Review of Systems  Objective:     Vital Signs (Most Recent):  Temp: 98.9 °F (37.2 °C) (04/28/19 0700)  Pulse: 92 (04/28/19 0945)  Resp: (!) 23 (04/28/19 0945)  BP: 134/75 (04/28/19 0930)  SpO2: 97 % (04/28/19 0945) Vital Signs (24h Range):  Temp:  [98.3 °F (36.8 °C)-99.5 °F (37.5 °C)] 98.9 °F (37.2 °C)  Pulse:  [] 92  Resp:  [14-32] 23  SpO2:  [93 %-100 %] 97 %  BP: (134-173)/(68-87) 134/75  Arterial Line BP: (131-194)/(59-83) 131/61     Weight: 87.2 kg (192 lb 3.9 oz)  Body mass index is 31.03 kg/m².      Intake/Output Summary (Last 24 hours) at 4/28/2019 1010  Last data filed at 4/28/2019 0900  Gross per 24 hour   Intake 2931.21 ml   Output 1213 ml   Net 1718.21 ml       Physical Exam    Constitutional: He is oriented to person, place, and time. He appears well-developed and well-nourished.   HENT:   Head: Normocephalic and atraumatic.   RIJ in place, no erythema or drainage   Eyes: Right eye exhibits no discharge. Left eye exhibits no discharge.   Neck: Normal range of motion. Neck supple.   Cardiovascular: Normal rate and regular rhythm.   Pulmonary/Chest: Effort normal. No respiratory distress.   Abdominal:   Non-distended.  Chevron incision present and c/d/i.  2 JANICE drains with SS output.   Musculoskeletal: Normal range of motion.   Neurological: He is alert and oriented to person, place, and time.   Skin: Skin is warm and dry.   Psychiatric: He has a normal mood and affect. His behavior is normal.   Vitals reviewed.      Lines/Drains/Airways     Central Venous Catheter Line                 Trialysis (Dialysis) Catheter 04/26/19 1805 right internal jugular 1 day          Drain                 Closed/Suction Drain Right Abdomen Bulb 19 Fr. -- days         Urethral Catheter 04/26/19 1756 Non-latex;Straight-tip 16 Fr. 1 day          Arterial Line                 Arterial Line 04/26/19 1744 Left Radial 1 day          Peripheral Intravenous Line                 Peripheral IV - Single Lumen 04/26/19 1353 18 G Left Forearm 1 day                Significant Labs:    CBC/Anemia Profile:  Recent Labs   Lab 04/27/19 1919 04/27/19  2338 04/28/19  0305   WBC 8.01 6.65 6.82   HGB 8.7* 8.5* 8.9*   HCT 24.4* 24.5* 25.3*   PLT 46* 46* 45*   MCV 90 94 93   RDW 14.6* 14.9* 14.9*        Chemistries:  Recent Labs   Lab 04/26/19  2230 04/27/19  0005 04/27/19  0308  04/27/19  1514  04/27/19  1604 04/27/19 1919 04/27/19  2338 04/28/19  0305   * 138 138  --  140  --   --  138  --  138   K 3.9 3.1* 4.3  --  4.0  --   --  3.7  --  3.8   CL  --  104 108  --  109  --   --  107  --  109   CO2  --  24 21*  --  21*  --   --  21*  --  22*   BUN  --  16 18  --  21*  --   --  23*  --  22*   CREATININE  --  0.9 1.0  --   1.1  --   --  1.0  --  1.0   CALCIUM  --  8.9 8.5*  --  8.3*  --   --  8.2*  --  8.0*   ALBUMIN 2.1* 2.4* 2.4*  --   --   --   --   --   --  2.9*   PROT  --  4.6* 4.6*  --   --   --   --   --   --  4.9*   BILITOT  --  2.5* 3.1*  --   --   --   --   --   --  1.6*   ALKPHOS  --  82 75  --   --   --   --   --   --  52*   ALT  --  2,292* 2,224*  --   --   --   --   --   --  1,219*   AST  --  3,942* 11,714*  11,714*   < >  --    < >  --  2,061* 1,398* 1,212*   MG 1.7 1.7  --   --   --   --  2.0  --   --   --    PHOS  --  2.9  --   --   --   --  4.8*  --   --   --     < > = values in this interval not displayed.       Significant Imaging: I have reviewed all pertinent imaging results/findings within the past 24 hours.      Scheduled Meds:   famotidine (PF)  20 mg Intravenous Q12H    furosemide  40 mg Oral Daily    heparin (porcine)  5,000 Units Subcutaneous Q8H    insulin aspart U-100  4-6 Units Subcutaneous TIDWM    methylPREDNISolone sodium succinate  80 mg Intravenous Q12H    Followed by    [START ON 4/29/2019] methylPREDNISolone sodium succinate  60 mg Intravenous Q12H    Followed by    [START ON 4/30/2019] methylPREDNISolone sodium succinate  40 mg Intravenous Q12H    Followed by    [START ON 5/1/2019] methylPREDNISolone sodium succinate  20 mg Intravenous Q12H    Followed by    [START ON 5/2/2019] predniSONE  20 mg Oral Daily    mycophenolate mofetil  1,000 mg Oral BID    nystatin  500,000 Units Mouth/Throat TID PC    sulfamethoxazole-trimethoprim 400-80mg  1 tablet Oral Daily    tacrolimus  3 mg Oral BID    [START ON 5/6/2019] valGANciclovir  450 mg Oral Daily     Continuous Infusions:   dextrose 5 % and 0.45 % NaCl 100 mL/hr at 04/28/19 0900    insulin (HUMAN R) infusion (adults) Stopped (04/28/19 0200)    propofol Stopped (04/27/19 0900)     PRN Meds:sodium chloride, sodium chloride, dextrose 50%, dextrose 50%, dextrose 50%, dextrose 50%, fentaNYL, glucagon (human recombinant), glucose, glucose,  hydrALAZINE, insulin aspart U-100, oxyCODONE, oxyCODONE    Vital signs in last 24 hours:  Temp:  [98.3 °F (36.8 °C)-99.5 °F (37.5 °C)] 98.9 °F (37.2 °C)  Pulse:  [] 90  Resp:  [14-32] 19  SpO2:  [93 %-100 %] 98 %  BP: (134-173)/(67-87) 137/67  Arterial Line BP: (131-194)/(59-83) 134/60    Intake/Output last 3 shifts:  I/O last 3 completed shifts:  In: 94216.5 [I.V.:9592.5; Blood:2288; NG/GT:100; IV Piggyback:400]  Out: 5952 [Urine:3270; Drains:882; Blood:1800]  Intake/Output this shift:  I/O this shift:  In: -   Out: 255 [Urine:155; Drains:100]    Problem Assessment/Plan  GI  * Liver replaced by transplant  Assessment & Plan  * Liver replaced by transplant  Cesario Bailey is a 53 y.o. male is s/p liver transplant on 4/27.  Pt has hx of alcoholic cirrhosis and HCC s/p tace x2. Extubated 4/27.      Neuro:   - Alert, oriented x 4  - Pain managed well with current regimen     Pulmonary:   - Extubated 4/27  - Satting well on RA  - CXR with slight opacification of right hemithorax, otherwise stable  - Encourage pulmonary toilet     Cardiac:   - HDS without pressors     Renal:    - Post op BUN/Cr stable   - Monitor UOP, has been borderline low, 20-35 ml/h  - Adding 40mg Lasix PO daily     ID:   - Bactrim, Valganciclovir, Amp-Sulbactam  - Prednisone taper, Prograf      Hem/Onc:   - 1 PLT, 4 FFP, 1 Cryo, 1 pRBC intraoperatively  - Post op Hgb 8.6     Endocrine:    - Weaned off Insulin gtt now  - BG 95-250s  - SSI ordered     Fluids/Electrolytes/Nutrition/GI:   - Replace electrolytes PRN     PPx:  - DVT: Heparin  - Bowel: Protonix     DISPO:  - Transplant team to remove lateral drain today and move to floor      Sarah Collado MD CA-1

## 2019-04-28 NOTE — PLAN OF CARE
"Problem: Adult Inpatient Plan of Care  Goal: Plan of Care Review  Outcome: Ongoing (interventions implemented as appropriate)  Dx: Liver replaced by transplant    Shift Events: VSS; no acute events. Pt on RA with sats remaining >95%.     Neuro: AAO x4, Arouses to Voice, Follows Commands and Moves All Extremities    Vital Signs: BP (!) 140/73 (BP Location: Right arm, Patient Position: Lying)   Pulse 87   Temp 98.5 °F (36.9 °C)   Resp (!) 22   Ht 5' 6" (1.676 m)   Wt 87.2 kg (192 lb 3.9 oz)   SpO2 96%   BMI 31.03 kg/m²     Diet: Clear Liquid    Gtts: Insulin and MIVF    Urine Output: Urinary Catheter 25-50 cc/hour    Drains: 2 JANICE drains with about 80cc total     Skin: CDI; heels, sacrum and elbows without breakdown. Dressing removed, staples intact.         "

## 2019-04-28 NOTE — ASSESSMENT & PLAN NOTE
* Liver replaced by transplant  Cesario Bailey is a 53 y.o. male is s/p liver transplant on 4/27.  Pt has hx of alcoholic cirrhosis and HCC s/p tace x2. Extubated 4/27.      Neuro:   - Alert, oriented x 4  - Pain managed well with current regimen     Pulmonary:   - Extubated 4/27  - Satting well on RA  - CXR with slight opacification of right hemithorax, otherwise stable  - Encourage pulmonary toilet     Cardiac:   - HDS without pressors     Renal:    - Post op BUN/Cr stable   - Monitor UOP, has been borderline low, 20-35 ml/h  - Adding 40mg Lasix PO daily     ID:   - Bactrim, Valganciclovir, Amp-Sulbactam  - Prednisone taper, Prograf      Hem/Onc:   - 1 PLT, 4 FFP, 1 Cryo, 1 pRBC intraoperatively  - Post op Hgb 8.6     Endocrine:    - Weaned off Insulin gtt now  - BG 95-250s  - SSI ordered     Fluids/Electrolytes/Nutrition/GI:   - Replace electrolytes PRN     PPx:  - DVT: Heparin  - Bowel: Protonix     DISPO:  - Transplant team to remove lateral drain today and move to floor      Sarah Collado MD CA-1

## 2019-04-28 NOTE — SUBJECTIVE & OBJECTIVE
"Interval HPI:   Overnight events: Remains in SICU. BG fell below goal overnight and IV transition insulin infusion stopped per protocol. Novolog scheduled with meals.  Received Solu Medrol 80 mg IV this morning.  Eatin%  Nausea: No  Hypoglycemia and intervention: No  Fever: No  TPN and/or TF: No      /75   Pulse 92   Temp 98.9 °F (37.2 °C) (Oral)   Resp (!) 23   Ht 5' 6" (1.676 m)   Wt 87.2 kg (192 lb 3.9 oz)   SpO2 97%   BMI 31.03 kg/m²     Labs Reviewed and Include    Recent Labs   Lab 19  0305   GLU 95   CALCIUM 8.0*   ALBUMIN 2.9*   PROT 4.9*      K 3.8   CO2 22*      BUN 22*   CREATININE 1.0   ALKPHOS 52*   ALT 1,219*   AST 1,212*   BILITOT 1.6*     Lab Results   Component Value Date    WBC 6.82 2019    HGB 8.9 (L) 2019    HCT 25.3 (L) 2019    MCV 93 2019    PLT 45 (L) 2019     No results for input(s): TSH, FREET4 in the last 168 hours.  Lab Results   Component Value Date    HGBA1C 6.9 (H) 2019       Nutritional status:   Body mass index is 31.03 kg/m².  Lab Results   Component Value Date    ALBUMIN 2.9 (L) 2019    ALBUMIN 2.4 (L) 2019    ALBUMIN 2.4 (L) 2019     No results found for: PREALBUMIN    Estimated Creatinine Clearance: 87.4 mL/min (based on SCr of 1 mg/dL).    Accu-Checks  Recent Labs     19  1201 19  1306 19  1413 19  1518 19  1607 19  1737 19  1834 19  2219 19  0437 19  0753   POCTGLUCOSE 162* 141* 147* 186* 184* 217* 197* 136* 95 259*       Current Medications and/or Treatments Impacting Glycemic Control  Immunotherapy:    Immunosuppressants         Stop Route Frequency     tacrolimus (PROGRAF) 1 mg/mL oral syringe      -- Oral 2 times daily     mycophenolate mofetil 200 mg/mL suspension 1,000 mg      -- Oral 2 times daily        Steroids:   Hormones (From admission, onward)    Start     Stop Route Frequency Ordered    19 0900  predniSONE " tablet 20 mg  (methylprednisolone taper panel)      -- Oral Daily 04/27/19 0001    05/01/19 0900  methylPREDNISolone sodium succinate injection 20 mg  (methylprednisolone taper panel)      05/02 0859 IV Every 12 hours 04/27/19 0001    04/30/19 0900  methylPREDNISolone sodium succinate injection 40 mg  (methylprednisolone taper panel)      05/01 0859 IV Every 12 hours 04/27/19 0001    04/29/19 0900  methylPREDNISolone sodium succinate injection 60 mg  (methylprednisolone taper panel)      04/30 0859 IV Every 12 hours 04/27/19 0001    04/28/19 0900  methylPREDNISolone sodium succinate injection 80 mg  (methylprednisolone taper panel)      04/29 0859 IV Every 12 hours 04/27/19 0001        Pressors:    Autonomic Drugs (From admission, onward)    None        Hyperglycemia/Diabetes Medications:   Antihyperglycemics (From admission, onward)    Start     Stop Route Frequency Ordered    04/28/19 0715  insulin aspart U-100 pen 4-6 Units      -- SubQ 3 times daily with meals 04/27/19 1901 04/27/19 2001  insulin aspart U-100 pen 0-5 Units      -- SubQ As needed (PRN) 04/27/19 1901 04/27/19 2000  insulin regular (Humulin R) 100 Units in sodium chloride 0.9% 100 mL infusion      -- IV Continuous 04/27/19 1901

## 2019-04-28 NOTE — PLAN OF CARE
"Problem: Adult Inpatient Plan of Care  Goal: Plan of Care Review  Outcome: Ongoing (interventions implemented as appropriate)  Dx: Liver replaced by transplant    Shift Events: VSS throughout shift. Room air. Patient's blood sugars in 200-300s. Endocrine notified. Insulin transition gtt restarted. Advanced to a diabetic diet. Tolerating well. PRN oxycodone given to maintain adequate pain control. JANICE drain # 1 removed by MD. Tolerated well. JANICE drain #2 output increasingly sanguinous post JANICE drain #1 removal. MD made aware. No interventions ordered. See note and flowsheet for further assessments and details. Plan of care reviewed with patient and spouse with help from . Questions encouraged and answered. Will continue to monitor.     Neuro: AAO x4, Arouses to Voice, Follows Commands and Moves All Extremities    Vital Signs: /67 (BP Location: Right arm, Patient Position: Sitting)   Pulse 77   Temp 99 °F (37.2 °C) (Oral)   Resp 18   Ht 5' 6" (1.676 m)   Wt 87.2 kg (192 lb 3.9 oz)   SpO2 98%   BMI 31.03 kg/m²     Diet: Diabetic Diet    Gtts: Insulin and MIVF    Urine Output: Urinary Catheter 1,205 cc/shift    Drains:  JANICE Drain 106 cc/shift     Labs/Accuchecks: ACHS     Skin: No skin breakdown noted. Abdominal incision clean, dry, and intact. Painted with iodine.          "

## 2019-04-28 NOTE — PROGRESS NOTES
"[facility]  Endocrinology  Progress Note    Admit Date: 2019     Reason for Consult: Management of T2DM, Hyperglycemia     Surgical Procedure and Date: Liver Transplant 19    Diabetes diagnosis year: > 5 years ago  Lab Results   Component Value Date    HGBA1C 6.9 (H) 2019     Home Diabetes Medications:  Humulin 20 units TID with meals, Lantus 20-25 units q HS    How often checking glucose at home? in the AM/ when he feels bad   BG readings on regimen: 80s-100  Hypoglycemia on the regimen?  No  Missed doses on regimen?  No    Diabetes Complications include:     Hyperglycemia    Complicating diabetes co morbidities:   CIRRHOSIS      HPI:   Patient is a 53 y.o. male with a diagnosis of T2DM, etoh cirrhosis, portal HTN, esophageal varices, and ESLD who presented for liver transplant. Father with DM per chart review.  Endocrinology consulted for management of T2DM/hyperglycemia.                 Interval HPI:   Overnight events: Remains in SICU. BG fell below goal overnight and IV transition insulin infusion stopped per protocol. Novolog scheduled with meals.  Received Solu Medrol 80 mg IV this morning.  Eatin%  Nausea: No  Hypoglycemia and intervention: No  Fever: No  TPN and/or TF: No      /75   Pulse 92   Temp 98.9 °F (37.2 °C) (Oral)   Resp (!) 23   Ht 5' 6" (1.676 m)   Wt 87.2 kg (192 lb 3.9 oz)   SpO2 97%   BMI 31.03 kg/m²      Labs Reviewed and Include    Recent Labs   Lab 19  0305   GLU 95   CALCIUM 8.0*   ALBUMIN 2.9*   PROT 4.9*      K 3.8   CO2 22*      BUN 22*   CREATININE 1.0   ALKPHOS 52*   ALT 1,219*   AST 1,212*   BILITOT 1.6*     Lab Results   Component Value Date    WBC 6.82 2019    HGB 8.9 (L) 2019    HCT 25.3 (L) 2019    MCV 93 2019    PLT 45 (L) 2019     No results for input(s): TSH, FREET4 in the last 168 hours.  Lab Results   Component Value Date    HGBA1C 6.9 (H) 2019       Nutritional " status:   Body mass index is 31.03 kg/m².  Lab Results   Component Value Date    ALBUMIN 2.9 (L) 04/28/2019    ALBUMIN 2.4 (L) 04/27/2019    ALBUMIN 2.4 (L) 04/27/2019     No results found for: PREALBUMIN    Estimated Creatinine Clearance: 87.4 mL/min (based on SCr of 1 mg/dL).    Accu-Checks  Recent Labs     04/27/19  1201 04/27/19  1306 04/27/19  1413 04/27/19  1518 04/27/19  1607 04/27/19  1737 04/27/19  1834 04/27/19  2219 04/28/19  0437 04/28/19  0753   POCTGLUCOSE 162* 141* 147* 186* 184* 217* 197* 136* 95 259*       Current Medications and/or Treatments Impacting Glycemic Control  Immunotherapy:    Immunosuppressants         Stop Route Frequency     tacrolimus (PROGRAF) 1 mg/mL oral syringe      -- Oral 2 times daily     mycophenolate mofetil 200 mg/mL suspension 1,000 mg      -- Oral 2 times daily        Steroids:   Hormones (From admission, onward)    Start     Stop Route Frequency Ordered    05/02/19 0900  predniSONE tablet 20 mg  (methylprednisolone taper panel)      -- Oral Daily 04/27/19 0001    05/01/19 0900  methylPREDNISolone sodium succinate injection 20 mg  (methylprednisolone taper panel)      05/02 0859 IV Every 12 hours 04/27/19 0001    04/30/19 0900  methylPREDNISolone sodium succinate injection 40 mg  (methylprednisolone taper panel)      05/01 0859 IV Every 12 hours 04/27/19 0001    04/29/19 0900  methylPREDNISolone sodium succinate injection 60 mg  (methylprednisolone taper panel)      04/30 0859 IV Every 12 hours 04/27/19 0001    04/28/19 0900  methylPREDNISolone sodium succinate injection 80 mg  (methylprednisolone taper panel)      04/29 0859 IV Every 12 hours 04/27/19 0001        Pressors:    Autonomic Drugs (From admission, onward)    None        Hyperglycemia/Diabetes Medications:   Antihyperglycemics (From admission, onward)    Start     Stop Route Frequency Ordered    04/28/19 0715  insulin aspart U-100 pen 4-6 Units      -- SubQ 3 times daily with meals 04/27/19 1901    04/27/19  2001  insulin aspart U-100 pen 0-5 Units      -- SubQ As needed (PRN) 04/27/19 1901 04/27/19 2000  insulin regular (Humulin R) 100 Units in sodium chloride 0.9% 100 mL infusion      -- IV Continuous 04/27/19 1901          ASSESSMENT and PLAN    * Liver replaced by transplant  Post op day 3  Managed per primary team  Avoid hypoglycemia        Type 2 diabetes mellitus with hyperglycemia  BG goal 140 - 180     Transition IV insulin infusion at 1.4 units/hr - (weight based using 0.8 modifier)   Novolog 4-6 units TID with meals (weight based using 0.5 modifier)  Low dose correction scale  BG monitoring AC/HS/0200    ** Please call Endocrine for any BG related issues   ** Please notify Endocrine for any change and/or advance in diet**    Discharge planning: TBD        Prophylactic immunotherapy  May increase insulin requirements      Adverse effect of corticosteroids  Glucocorticoids markedly increase glucoses.   May increase insulin requirements.        Class 1 obesity due to excess calories in adult  May increase insulin resistance.             Anat Leyva NP  Endocrinology  [facility]

## 2019-04-28 NOTE — SUBJECTIVE & OBJECTIVE
Interval:  Doing well this morning without complaints  Drain output wnl SS  LFTs trending down    Scheduled Meds:   famotidine (PF)  20 mg Intravenous Q12H    furosemide  40 mg Oral Daily    heparin (porcine)  5,000 Units Subcutaneous Q8H    insulin aspart U-100  4-6 Units Subcutaneous TIDWM    methylPREDNISolone sodium succinate  80 mg Intravenous Q12H    Followed by    [START ON 4/29/2019] methylPREDNISolone sodium succinate  60 mg Intravenous Q12H    Followed by    [START ON 4/30/2019] methylPREDNISolone sodium succinate  40 mg Intravenous Q12H    Followed by    [START ON 5/1/2019] methylPREDNISolone sodium succinate  20 mg Intravenous Q12H    Followed by    [START ON 5/2/2019] predniSONE  20 mg Oral Daily    mycophenolate mofetil  1,000 mg Oral BID    nystatin  500,000 Units Mouth/Throat TID PC    sulfamethoxazole-trimethoprim 400-80mg  1 tablet Oral Daily    tacrolimus  3 mg Oral BID    [START ON 5/6/2019] valGANciclovir  450 mg Oral Daily     Continuous Infusions:   dextrose 5 % and 0.45 % NaCl 100 mL/hr at 04/28/19 0900    insulin (HUMAN R) infusion (adults) Stopped (04/28/19 0200)    propofol Stopped (04/27/19 0900)     PRN Meds:sodium chloride, sodium chloride, dextrose 50%, dextrose 50%, dextrose 50%, dextrose 50%, fentaNYL, glucagon (human recombinant), glucose, glucose, hydrALAZINE, insulin aspart U-100, oxyCODONE, oxyCODONE    Review of Systems     Otherwise negative   Objective:     Vital Signs (Most Recent):  Temp: 98.9 °F (37.2 °C) (04/28/19 0700)  Pulse: 97 (04/28/19 0915)  Resp: (!) 21 (04/28/19 0915)  BP: 139/75 (04/28/19 0900)  SpO2: 99 % (04/28/19 0915) Vital Signs (24h Range):  Temp:  [98.3 °F (36.8 °C)-99.5 °F (37.5 °C)] 98.9 °F (37.2 °C)  Pulse:  [] 97  Resp:  [14-32] 21  SpO2:  [93 %-100 %] 99 %  BP: (134-175)/(68-87) 139/75  Arterial Line BP: (146-194)/(59-83) 148/63     Weight: 87.2 kg (192 lb 3.9 oz)  Body mass index is 31.03 kg/m².    Intake/Output - Last 3 Shifts        04/26 0700 - 04/27 0659 04/27 0700 - 04/28 0659 04/28 0700 - 04/29 0659    I.V. (mL/kg) 7061.3 (82.8) 2781.2 (31.9)     Blood 2288      NG/GT  100     IV Piggyback 250 150     Total Intake(mL/kg) 9599.3 (112.5) 3031.2 (34.8)     Urine (mL/kg/hr) 3085 885 (0.4) 155 (0.6)    Drains 486 396 100    Stool  0     Blood 1800      Total Output 5371 1281 255    Net +4228.3 +1750.2 -255           Stool Occurrence  0 x           Physical Exam    A&O, NAD  RRR  No Resp Distress on NC post extubation   ABD: mildly distended, drains with SS output, AppTTP    Laboratory:  Immunosuppressants         Stop Route Frequency     tacrolimus (PROGRAF) 1 mg/mL oral syringe      -- Oral 2 times daily     mycophenolate mofetil 200 mg/mL suspension 1,000 mg      -- Oral 2 times daily        CBC:   Recent Labs   Lab 04/28/19  0305   WBC 6.82   RBC 2.71*   HGB 8.9*   HCT 25.3*   PLT 45*   MCV 93   MCH 32.8*   MCHC 35.2     CMP:   Recent Labs   Lab 04/28/19  0305   GLU 95   CALCIUM 8.0*   ALBUMIN 2.9*   PROT 4.9*      K 3.8   CO2 22*      BUN 22*   CREATININE 1.0   ALKPHOS 52*   ALT 1,219*   AST 1,212*   BILITOT 1.6*     Coagulation:   Recent Labs   Lab 04/28/19  0305   INR 1.2   APTT 26.3       Diagnostic Results:  US Liver Transplant Post:    Results for orders placed during the hospital encounter of 04/26/19   US Liver Transplant Post    Narrative EXAMINATION:  US LIVER TRANSPLANT POST    CLINICAL HISTORY:  Assess perfusion following liver transplant;    TECHNIQUE:  Limited abdominal ultrasound of the transplant liver with Doppler evaluation.  Color and spectral Doppler were performed.    COMPARISON:  None.    FINDINGS:  Patient is status post orthotopic liver transplantday 1.  Liver allograft is normal in size.  The liver demonstrates mild diffusely increased echogenicity which can be seen with fatty infiltration.  No focal hepatic lesions are seen.  There is a 9.8 x 7.3 x 1.8 cm fluid collection posterior to the right  hepatic lobe.  There is also a small amount of free fluid.    The common duct is not dilated, measuring 5 mm.  No dilated intrahepatic radicles are seen.    Color flow and spectral waveform analysis was performed.  The main portal vein, right portal vein, left portal vein, middle hepatic vein, right hepatic vein, left hepatic vein, and IVC are patent with proper directional flow.    Anastomosis site of the main hepatic artery demonstrates a peak systolic velocity 101 cm/sec.    Main hepatic artery demonstrates resistive index 0.81-0.87 with normal waveform    Left hepatic artery shows resistive index 0.86-1 with normal waveform.    Anterior branch of the right hepatic artery demonstrates resistive index 1 with normal waveform.    Posterior branch of the right hepatic artery demonstrates resistive index 1 with normal waveform.      Impression Day 1 liver transplant with small amount of scattered free fluid, 1 focal fluid collection, and increased echogenicity of the hepatic parenchyma suggesting fatty infiltration of the donor liver.  The resistive indices in the hepatic arteries are elevated, as high as 1, throughout the liver.  Follow-up ultrasound is recommended as this can be a negative prognostic sign of the day 1 ultrasound.    COMMUNICATION  This critical result was discovered/received at 0855.  The critical information above was relayed directly by me by telephone to Dr. Levy On April 27, 2019 at 0905.      Electronically signed by: Yael Smith MD  Date:    04/27/2019  Time:    09:08

## 2019-04-28 NOTE — PLAN OF CARE
Problem: Adult Inpatient Plan of Care  Goal: Plan of Care Review  Outcome: Ongoing (interventions implemented as appropriate)  Recommendations    Recommendation/Intervention:   1. Advance diet as tolerated to Regular diet.   2. RD to provide post-tx diet education on follow-up. Will monitor.   Goals: Pt to meet % EEN and EPN.

## 2019-04-28 NOTE — SUBJECTIVE & OBJECTIVE
Follow-up For: Procedure(s) (LRB):  TRANSPLANT, LIVER (N/A)    Post-Operative Day: 2 Day Post-Op    Interval Events: Extubated yesterday. Given Albumin x 1 for low UOP, with some improvement. Insulin gtt weaned off.       Past Medical History:   Diagnosis Date    Alcoholic cirrhosis     Diabetes mellitus     Encounter for blood transfusion     HCC (hepatocellular carcinoma)     Portal hypertension        Past Surgical History:   Procedure Laterality Date    COLONOSCOPY N/A 7/23/2018    Performed by [facility] at [facility] ([facility] FLR)    EMBOLIZATION      ESOPHAGOGASTRODUODENOSCOPY (EGD) N/A 7/23/2018    Performed by Romain [facility]ora [facility] at [facility] ENDO (4TH FLR)    HERNIA REPAIR      umbilical hernia repair, large mesh placed.        Review of patient's allergies indicates:   Allergen Reactions    Aspirin        Family History     Problem Relation (Age of Onset)    Diabetes Father    Heart disease Mother    Hyperlipidemia Mother    Hypertension Mother    Stroke Mother        Tobacco Use    Smoking status: Never Smoker    Smokeless tobacco: Never Used   Substance and Sexual Activity    Alcohol use: Never     Frequency: Never    Drug use: Never    Sexual activity: Not on file      Review of Systems  Objective:     Vital Signs (Most Recent):  Temp: 98.9 °F (37.2 °C) (04/28/19 0700)  Pulse: 92 (04/28/19 0945)  Resp: (!) 23 (04/28/19 0945)  BP: 134/75 (04/28/19 0930)  SpO2: 97 % (04/28/19 0945) Vital Signs (24h Range):  Temp:  [98.3 °F (36.8 °C)-99.5 °F (37.5 °C)] 98.9 °F (37.2 °C)  Pulse:  [] 92  Resp:  [14-32] 23  SpO2:  [93 %-100 %] 97 %  BP: (134-173)/(68-87) 134/75  Arterial Line BP: (131-194)/(59-83) 131/61     Weight: 87.2 kg (192 lb 3.9 oz)  Body mass index is 31.03 kg/m².      Intake/Output Summary (Last 24 hours) at 4/28/2019 1010  Last data filed at 4/28/2019 0900  Gross per 24 hour   Intake 2931.21 ml   Output 1213 ml   Net 1718.21 ml       Physical Exam   Constitutional: He is  oriented to person, place, and time. He appears well-developed and well-nourished.   HENT:   Head: Normocephalic and atraumatic.   RIJ in place, no erythema or drainage   Eyes: Right eye exhibits no discharge. Left eye exhibits no discharge.   Neck: Normal range of motion. Neck supple.   Cardiovascular: Normal rate and regular rhythm.   Pulmonary/Chest: Effort normal. No respiratory distress.   Abdominal:   Non-distended.  Chevron incision present and c/d/i.  2 JANICE drains with SS output.   Musculoskeletal: Normal range of motion.   Neurological: He is alert and oriented to person, place, and time.   Skin: Skin is warm and dry.   Psychiatric: He has a normal mood and affect. His behavior is normal.   Vitals reviewed.      Lines/Drains/Airways     Central Venous Catheter Line                 Trialysis (Dialysis) Catheter 04/26/19 1805 right internal jugular 1 day          Drain                 Closed/Suction Drain Right Abdomen Bulb 19 Fr. -- days         Urethral Catheter 04/26/19 1756 Non-latex;Straight-tip 16 Fr. 1 day          Arterial Line                 Arterial Line 04/26/19 1744 Left Radial 1 day          Peripheral Intravenous Line                 Peripheral IV - Single Lumen 04/26/19 1353 18 G Left Forearm 1 day                Significant Labs:    CBC/Anemia Profile:  Recent Labs   Lab 04/27/19 1919 04/27/19  2338 04/28/19  0305   WBC 8.01 6.65 6.82   HGB 8.7* 8.5* 8.9*   HCT 24.4* 24.5* 25.3*   PLT 46* 46* 45*   MCV 90 94 93   RDW 14.6* 14.9* 14.9*        Chemistries:  Recent Labs   Lab 04/26/19  2230 04/27/19  0005 04/27/19  0308  04/27/19  1514  04/27/19  1604 04/27/19  1919 04/27/19  2338 04/28/19  0305   * 138 138  --  140  --   --  138  --  138   K 3.9 3.1* 4.3  --  4.0  --   --  3.7  --  3.8   CL  --  104 108  --  109  --   --  107  --  109   CO2  --  24 21*  --  21*  --   --  21*  --  22*   BUN  --  16 18  --  21*  --   --  23*  --  22*   CREATININE  --  0.9 1.0  --  1.1  --   --  1.0  --  1.0    CALCIUM  --  8.9 8.5*  --  8.3*  --   --  8.2*  --  8.0*   ALBUMIN 2.1* 2.4* 2.4*  --   --   --   --   --   --  2.9*   PROT  --  4.6* 4.6*  --   --   --   --   --   --  4.9*   BILITOT  --  2.5* 3.1*  --   --   --   --   --   --  1.6*   ALKPHOS  --  82 75  --   --   --   --   --   --  52*   ALT  --  2,292* 2,224*  --   --   --   --   --   --  1,219*   AST  --  3,942* 11,714*  11,714*   < >  --    < >  --  2,061* 1,398* 1,212*   MG 1.7 1.7  --   --   --   --  2.0  --   --   --    PHOS  --  2.9  --   --   --   --  4.8*  --   --   --     < > = values in this interval not displayed.       Significant Imaging: I have reviewed all pertinent imaging results/findings within the past 24 hours.

## 2019-04-29 ENCOUNTER — TELEPHONE (OUTPATIENT)
Dept: TRANSPLANT | Facility: HOSPITAL | Age: 54
End: 2019-04-29

## 2019-04-29 LAB
ALBUMIN SERPL BCP-MCNC: 2.4 G/DL (ref 3.5–5.2)
ALLENS TEST: ABNORMAL
ALP SERPL-CCNC: 53 U/L (ref 55–135)
ALT SERPL W/O P-5'-P-CCNC: 765 U/L (ref 10–44)
ANION GAP SERPL CALC-SCNC: 9 MMOL/L (ref 8–16)
ANISOCYTOSIS BLD QL SMEAR: SLIGHT
APTT BLDCRRT: 26.4 SEC (ref 21–32)
AST SERPL-CCNC: 234 U/L (ref 10–40)
BASOPHILS # BLD AUTO: 0 K/UL (ref 0–0.2)
BASOPHILS NFR BLD: 0 % (ref 0–1.9)
BILIRUB DIRECT SERPL-MCNC: 1.1 MG/DL (ref 0.1–0.3)
BILIRUB SERPL-MCNC: 1.7 MG/DL (ref 0.1–1)
BLD PROD TYP BPU: NORMAL
BLOOD UNIT EXPIRATION DATE: NORMAL
BLOOD UNIT TYPE CODE: 6200
BLOOD UNIT TYPE: NORMAL
BUN SERPL-MCNC: 22 MG/DL (ref 6–20)
CALCIUM SERPL-MCNC: 7.7 MG/DL (ref 8.7–10.5)
CHLORIDE SERPL-SCNC: 104 MMOL/L (ref 95–110)
CO2 SERPL-SCNC: 19 MMOL/L (ref 23–29)
CODING SYSTEM: NORMAL
CREAT SERPL-MCNC: 0.9 MG/DL (ref 0.5–1.4)
DELSYS: ABNORMAL
DIFFERENTIAL METHOD: ABNORMAL
DISPENSE STATUS: NORMAL
EOSINOPHIL # BLD AUTO: 0 K/UL (ref 0–0.5)
EOSINOPHIL NFR BLD: 0.4 % (ref 0–8)
ERYTHROCYTE [DISTWIDTH] IN BLOOD BY AUTOMATED COUNT: 14.2 % (ref 11.5–14.5)
ERYTHROCYTE [SEDIMENTATION RATE] IN BLOOD BY WESTERGREN METHOD: 16 MM/H
EST. GFR  (AFRICAN AMERICAN): >60 ML/MIN/1.73 M^2
EST. GFR  (NON AFRICAN AMERICAN): >60 ML/MIN/1.73 M^2
FIO2: 50
GGT SERPL-CCNC: 248 U/L (ref 8–55)
GLUCOSE SERPL-MCNC: 265 MG/DL (ref 70–110)
HCO3 UR-SCNC: 23.8 MMOL/L (ref 24–28)
HCT VFR BLD AUTO: 21.1 % (ref 40–54)
HCV RNA SERPL NAA+PROBE-LOG IU: <1.08 LOG (10) IU/ML
HCV RNA SERPL QL NAA+PROBE: NOT DETECTED IU/ML
HCV RNA SPEC NAA+PROBE-ACNC: <12 IU/ML
HGB BLD-MCNC: 7.7 G/DL (ref 14–18)
HYPOCHROMIA BLD QL SMEAR: ABNORMAL
IMM GRANULOCYTES # BLD AUTO: 0.03 K/UL (ref 0–0.04)
IMM GRANULOCYTES NFR BLD AUTO: 0.6 % (ref 0–0.5)
INR PPP: 1.1 (ref 0.8–1.2)
LYMPHOCYTES # BLD AUTO: 0.1 K/UL (ref 1–4.8)
LYMPHOCYTES NFR BLD: 2.1 % (ref 18–48)
MCH RBC QN AUTO: 32.9 PG (ref 27–31)
MCHC RBC AUTO-ENTMCNC: 36.5 G/DL (ref 32–36)
MCV RBC AUTO: 90 FL (ref 82–98)
MIN VOL: 8.4
MODE: ABNORMAL
MONOCYTES # BLD AUTO: 0.1 K/UL (ref 0.3–1)
MONOCYTES NFR BLD: 2.7 % (ref 4–15)
NEUTROPHILS # BLD AUTO: 4.6 K/UL (ref 1.8–7.7)
NEUTROPHILS NFR BLD: 94.2 % (ref 38–73)
NRBC BLD-RTO: 0 /100 WBC
OVALOCYTES BLD QL SMEAR: ABNORMAL
PCO2 BLDA: 38 MMHG (ref 35–45)
PEEP: 5
PH SMN: 7.41 [PH] (ref 7.35–7.45)
PIP: 16
PLATELET # BLD AUTO: 35 K/UL (ref 150–350)
PMV BLD AUTO: 11.1 FL (ref 9.2–12.9)
PO2 BLDA: 210 MMHG (ref 80–100)
POC BE: -1 MMOL/L
POC SATURATED O2: 100 % (ref 95–100)
POC TCO2: 25 MMOL/L (ref 23–27)
POCT GLUCOSE: 136 MG/DL (ref 70–110)
POCT GLUCOSE: 141 MG/DL (ref 70–110)
POCT GLUCOSE: 147 MG/DL (ref 70–110)
POCT GLUCOSE: 155 MG/DL (ref 70–110)
POCT GLUCOSE: 164 MG/DL (ref 70–110)
POCT GLUCOSE: 226 MG/DL (ref 70–110)
POCT GLUCOSE: 271 MG/DL (ref 70–110)
POCT GLUCOSE: 304 MG/DL (ref 70–110)
POCT GLUCOSE: 317 MG/DL (ref 70–110)
POCT GLUCOSE: 344 MG/DL (ref 70–110)
POIKILOCYTOSIS BLD QL SMEAR: SLIGHT
POLYCHROMASIA BLD QL SMEAR: ABNORMAL
POTASSIUM SERPL-SCNC: 4.7 MMOL/L (ref 3.5–5.1)
PROT SERPL-MCNC: 4.7 G/DL (ref 6–8.4)
PROTHROMBIN TIME: 11.6 SEC (ref 9–12.5)
RBC # BLD AUTO: 2.34 M/UL (ref 4.6–6.2)
SAMPLE: ABNORMAL
SITE: ABNORMAL
SODIUM SERPL-SCNC: 132 MMOL/L (ref 136–145)
SP02: 100
TACROLIMUS BLD-MCNC: <1.5 NG/ML (ref 5–15)
TRANS PLATPHERESIS VOL PATIENT: NORMAL ML
VT: 500
WBC # BLD AUTO: 4.85 K/UL (ref 3.9–12.7)

## 2019-04-29 PROCEDURE — 99233 PR SUBSEQUENT HOSPITAL CARE,LEVL III: ICD-10-PCS | Mod: 24,,, | Performed by: SURGERY

## 2019-04-29 PROCEDURE — 94761 N-INVAS EAR/PLS OXIMETRY MLT: CPT

## 2019-04-29 PROCEDURE — 25000003 PHARM REV CODE 250: Performed by: TRANSPLANT SURGERY

## 2019-04-29 PROCEDURE — P9035 PLATELET PHERES LEUKOREDUCED: HCPCS

## 2019-04-29 PROCEDURE — 99232 SBSQ HOSP IP/OBS MODERATE 35: CPT | Mod: ,,, | Performed by: NURSE PRACTITIONER

## 2019-04-29 PROCEDURE — 99232 PR SUBSEQUENT HOSPITAL CARE,LEVL II: ICD-10-PCS | Mod: ,,, | Performed by: NURSE PRACTITIONER

## 2019-04-29 PROCEDURE — 25000003 PHARM REV CODE 250: Performed by: STUDENT IN AN ORGANIZED HEALTH CARE EDUCATION/TRAINING PROGRAM

## 2019-04-29 PROCEDURE — 81300032 HC LIVER TRANSPORT, FLIGHT WITHIN 701-1000 MILES

## 2019-04-29 PROCEDURE — 81300000 HC LIVER ACQUISITION CHARGE

## 2019-04-29 PROCEDURE — 82977 ASSAY OF GGT: CPT

## 2019-04-29 PROCEDURE — 25000003 PHARM REV CODE 250: Performed by: HOSPITALIST

## 2019-04-29 PROCEDURE — 63600175 PHARM REV CODE 636 W HCPCS: Performed by: STUDENT IN AN ORGANIZED HEALTH CARE EDUCATION/TRAINING PROGRAM

## 2019-04-29 PROCEDURE — 63600175 PHARM REV CODE 636 W HCPCS: Performed by: NURSE PRACTITIONER

## 2019-04-29 PROCEDURE — 63600175 PHARM REV CODE 636 W HCPCS: Performed by: SURGERY

## 2019-04-29 PROCEDURE — 80053 COMPREHEN METABOLIC PANEL: CPT

## 2019-04-29 PROCEDURE — S0028 INJECTION, FAMOTIDINE, 20 MG: HCPCS | Performed by: TRANSPLANT SURGERY

## 2019-04-29 PROCEDURE — 25000003 PHARM REV CODE 250: Performed by: SURGERY

## 2019-04-29 PROCEDURE — 99233 SBSQ HOSP IP/OBS HIGH 50: CPT | Mod: 24,,, | Performed by: SURGERY

## 2019-04-29 PROCEDURE — 63600175 PHARM REV CODE 636 W HCPCS: Performed by: HOSPITALIST

## 2019-04-29 PROCEDURE — 20600001 HC STEP DOWN PRIVATE ROOM

## 2019-04-29 PROCEDURE — 63600175 PHARM REV CODE 636 W HCPCS: Performed by: TRANSPLANT SURGERY

## 2019-04-29 PROCEDURE — 82248 BILIRUBIN DIRECT: CPT

## 2019-04-29 PROCEDURE — 97165 OT EVAL LOW COMPLEX 30 MIN: CPT

## 2019-04-29 PROCEDURE — 94799 UNLISTED PULMONARY SVC/PX: CPT

## 2019-04-29 PROCEDURE — 85025 COMPLETE CBC W/AUTO DIFF WBC: CPT

## 2019-04-29 PROCEDURE — 85730 THROMBOPLASTIN TIME PARTIAL: CPT

## 2019-04-29 PROCEDURE — 85610 PROTHROMBIN TIME: CPT

## 2019-04-29 PROCEDURE — 80197 ASSAY OF TACROLIMUS: CPT

## 2019-04-29 PROCEDURE — 97162 PT EVAL MOD COMPLEX 30 MIN: CPT

## 2019-04-29 RX ORDER — IBUPROFEN 200 MG
24 TABLET ORAL
Status: DISCONTINUED | OUTPATIENT
Start: 2019-04-29 | End: 2019-05-03

## 2019-04-29 RX ORDER — INSULIN ASPART 100 [IU]/ML
4-6 INJECTION, SOLUTION INTRAVENOUS; SUBCUTANEOUS
Status: DISCONTINUED | OUTPATIENT
Start: 2019-04-29 | End: 2019-05-02

## 2019-04-29 RX ORDER — INSULIN ASPART 100 [IU]/ML
0-5 INJECTION, SOLUTION INTRAVENOUS; SUBCUTANEOUS
Status: DISCONTINUED | OUTPATIENT
Start: 2019-04-29 | End: 2019-05-03

## 2019-04-29 RX ORDER — TACROLIMUS 1 MG/1
6 CAPSULE ORAL EVERY 12 HOURS
Qty: 360 CAPSULE | Refills: 11 | Status: SHIPPED | OUTPATIENT
Start: 2019-04-29 | End: 2019-05-03

## 2019-04-29 RX ORDER — FAMOTIDINE 20 MG/1
20 TABLET, FILM COATED ORAL NIGHTLY
Status: DISCONTINUED | OUTPATIENT
Start: 2019-04-29 | End: 2019-05-04 | Stop reason: HOSPADM

## 2019-04-29 RX ORDER — ERGOCALCIFEROL 1.25 MG/1
50000 CAPSULE ORAL
Status: DISCONTINUED | OUTPATIENT
Start: 2019-04-30 | End: 2019-05-04 | Stop reason: HOSPADM

## 2019-04-29 RX ORDER — HYDROCODONE BITARTRATE AND ACETAMINOPHEN 500; 5 MG/1; MG/1
TABLET ORAL
Status: DISCONTINUED | OUTPATIENT
Start: 2019-04-29 | End: 2019-05-04 | Stop reason: HOSPADM

## 2019-04-29 RX ORDER — TACROLIMUS 1 MG/1
2 CAPSULE ORAL ONCE
Status: COMPLETED | OUTPATIENT
Start: 2019-04-29 | End: 2019-04-29

## 2019-04-29 RX ORDER — GLUCAGON 1 MG
1 KIT INJECTION
Status: DISCONTINUED | OUTPATIENT
Start: 2019-04-29 | End: 2019-05-03

## 2019-04-29 RX ORDER — ACYCLOVIR 200 MG/1
400 CAPSULE ORAL 2 TIMES DAILY
Status: DISCONTINUED | OUTPATIENT
Start: 2019-05-06 | End: 2019-05-04 | Stop reason: HOSPADM

## 2019-04-29 RX ORDER — MYCOPHENOLATE MOFETIL 250 MG/1
1000 CAPSULE ORAL 2 TIMES DAILY
Qty: 240 CAPSULE | Refills: 2 | Status: SHIPPED | OUTPATIENT
Start: 2019-04-29 | End: 2019-05-30 | Stop reason: SDUPTHER

## 2019-04-29 RX ORDER — PREDNISONE 10 MG/1
TABLET ORAL
Qty: 40 TABLET | Refills: 0 | Status: SHIPPED | OUTPATIENT
Start: 2019-04-29 | End: 2019-04-30

## 2019-04-29 RX ORDER — DOCUSATE SODIUM 100 MG/1
100 CAPSULE, LIQUID FILLED ORAL 3 TIMES DAILY
Status: DISCONTINUED | OUTPATIENT
Start: 2019-04-29 | End: 2019-05-01

## 2019-04-29 RX ORDER — IBUPROFEN 200 MG
16 TABLET ORAL
Status: DISCONTINUED | OUTPATIENT
Start: 2019-04-29 | End: 2019-05-03

## 2019-04-29 RX ORDER — MYCOPHENOLATE MOFETIL 250 MG/1
1000 CAPSULE ORAL 2 TIMES DAILY
Status: DISCONTINUED | OUTPATIENT
Start: 2019-04-29 | End: 2019-05-04 | Stop reason: HOSPADM

## 2019-04-29 RX ORDER — ACYCLOVIR 400 MG/1
400 TABLET ORAL 2 TIMES DAILY
Qty: 60 TABLET | Refills: 2 | Status: SHIPPED | OUTPATIENT
Start: 2019-04-26 | End: 2019-04-30 | Stop reason: SDUPTHER

## 2019-04-29 RX ORDER — TACROLIMUS 1 MG/1
5 CAPSULE ORAL 2 TIMES DAILY
Status: DISCONTINUED | OUTPATIENT
Start: 2019-04-29 | End: 2019-04-30

## 2019-04-29 RX ADMIN — OXYCODONE HYDROCHLORIDE 10 MG: 10 TABLET ORAL at 02:04

## 2019-04-29 RX ADMIN — FAMOTIDINE 20 MG: 20 TABLET, FILM COATED ORAL at 09:04

## 2019-04-29 RX ADMIN — TACROLIMUS 5 MG: 5 CAPSULE ORAL at 06:04

## 2019-04-29 RX ADMIN — Medication 1000 MG: at 09:04

## 2019-04-29 RX ADMIN — DOCUSATE SODIUM 100 MG: 100 CAPSULE, LIQUID FILLED ORAL at 09:04

## 2019-04-29 RX ADMIN — HEPARIN SODIUM 5000 UNITS: 5000 INJECTION, SOLUTION INTRAVENOUS; SUBCUTANEOUS at 09:04

## 2019-04-29 RX ADMIN — SULFAMETHOXAZOLE AND TRIMETHOPRIM 1 TABLET: 400; 80 TABLET ORAL at 08:04

## 2019-04-29 RX ADMIN — TACROLIMUS 2 MG: 1 CAPSULE ORAL at 12:04

## 2019-04-29 RX ADMIN — METHYLPREDNISOLONE SODIUM SUCCINATE 60 MG: 125 INJECTION, POWDER, FOR SOLUTION INTRAMUSCULAR; INTRAVENOUS at 09:04

## 2019-04-29 RX ADMIN — SODIUM CHLORIDE 6 UNITS/HR: 9 INJECTION, SOLUTION INTRAVENOUS at 08:04

## 2019-04-29 RX ADMIN — NYSTATIN 500000 UNITS: 500000 SUSPENSION ORAL at 01:04

## 2019-04-29 RX ADMIN — OXYCODONE HYDROCHLORIDE 5 MG: 5 TABLET ORAL at 03:04

## 2019-04-29 RX ADMIN — DOCUSATE SODIUM 100 MG: 100 CAPSULE, LIQUID FILLED ORAL at 02:04

## 2019-04-29 RX ADMIN — HEPARIN SODIUM 5000 UNITS: 5000 INJECTION, SOLUTION INTRAVENOUS; SUBCUTANEOUS at 01:04

## 2019-04-29 RX ADMIN — SODIUM CHLORIDE 12.5 UNITS/HR: 9 INJECTION, SOLUTION INTRAVENOUS at 04:04

## 2019-04-29 RX ADMIN — METHYLPREDNISOLONE SODIUM SUCCINATE 60 MG: 125 INJECTION, POWDER, FOR SOLUTION INTRAMUSCULAR; INTRAVENOUS at 08:04

## 2019-04-29 RX ADMIN — Medication 3 MG: at 08:04

## 2019-04-29 RX ADMIN — FAMOTIDINE 20 MG: 10 INJECTION, SOLUTION INTRAVENOUS at 08:04

## 2019-04-29 RX ADMIN — NYSTATIN 500000 UNITS: 500000 SUSPENSION ORAL at 09:04

## 2019-04-29 RX ADMIN — NYSTATIN 500000 UNITS: 500000 SUSPENSION ORAL at 06:04

## 2019-04-29 RX ADMIN — MYCOPHENOLATE MOFETIL 1000 MG: 250 CAPSULE ORAL at 09:04

## 2019-04-29 RX ADMIN — FUROSEMIDE 40 MG: 40 TABLET ORAL at 08:04

## 2019-04-29 RX ADMIN — OXYCODONE HYDROCHLORIDE 10 MG: 10 TABLET ORAL at 10:04

## 2019-04-29 RX ADMIN — INSULIN ASPART 6 UNITS: 100 INJECTION, SOLUTION INTRAVENOUS; SUBCUTANEOUS at 04:04

## 2019-04-29 RX ADMIN — SODIUM CHLORIDE 5 UNITS/HR: 9 INJECTION, SOLUTION INTRAVENOUS at 08:04

## 2019-04-29 NOTE — PT/OT/SLP EVAL
"Physical Therapy Evaluation    Patient Name:  Cesario Bailey   MRN:  [id number]    Recommendations:     Discharge Recommendations:  home(with family)   Discharge Equipment Recommendations: none   Barriers to discharge: 2 story home    Assessment:     Cesario Bailey is a 53 y.o. male admitted with a medical diagnosis of Liver replaced by transplant.  He presents with the following impairments/functional limitations:  impaired endurance, impaired functional mobilty, gait instability, pain Pt will benefit from PT to maximize his functional (I) with mobility and decrease caregiver burden after d/c.  He should cont to increase his OOB mobility by amb with nsg or family to increase his endurance.  Pt should progress well with PT.    Rehab Prognosis: Good; patient would benefit from acute skilled PT services to address these deficits and reach maximum level of function.    Recent Surgery: Procedure(s) (LRB):  TRANSPLANT, LIVER (N/A) 3 Days Post-Op    Plan:     During this hospitalization, patient to be seen 3 x/week to address the identified rehab impairments via gait training, therapeutic activities, therapeutic exercises, neuromuscular re-education and progress toward the following goals:    · Plan of Care Expires:  05/29/19    Subjective     Chief Complaint: "It hurts when I breathe deep".  - Reyna- present to interpret as pt and his wife are [place] citizen speaking only.  Patient/Family Comments/goals: to get better  Pain/Comfort:  · Pain Rating 1: 5/10  · Location - Orientation 1: upper  · Location 1: abdomen  · Pain Addressed 1: Pre-medicate for activity  · Pain Rating Post-Intervention 1: 5/10    Patients cultural, spiritual, Mu-ism conflicts given the current situation: no    Living Environment:  Pt lives with his wife in 2 story home, no entry steps and pt can stay on first floor if required. Walk in shower with small step over .  Prior to admission, patients level of function was (I) with gait and ADL's. " " Equipment used at home: none.  DME owned (not currently used): none.  Upon discharge, patient will have assistance from his wife who can assist.    Objective:     Communicated with nsg,  prior to session.  Patient found up in chair with arterial line, blood pressure cuff, central line, peripheral IV, telemetry, pulse ox (continuous)  upon PT entry to room.    General Precautions: Standard, fall   Orthopedic Precautions:N/A   Braces: N/A     Exams:  · Cognitive Exam:  Patient is oriented to Person and Situation  · Gross Motor Coordination:  WFL  · Postural Exam:  Patient presented with the following abnormalities:    · -       No postural abnormalities identified  · Sensation:    · -       Intact  · RLE ROM: WNL  · RLE Strength: WNL  · LLE ROM: WNL  · LLE Strength: WNL    Functional Mobility:  · Transfers:     · Sit to Stand:  contact guard assistance with no AD  · Gait: Pt amb 180" with HHA x 1 and CGA on level surfaces with mild unsteadiness noted, decrease step length and laura.   · Balance: stand static: SBA   stand dynamic: CGA      Therapeutic Activities and Exercises:   PT eval completed.  PT ed pt on PT POC, safety awareness thru  and pt verbalized good understanding.    White board updated in pt's room.  PT spoke with pt's nurse, Jeri, re: pt's mobility and PT POC and requested her to pass on in report for pt to amb with nsg or family daily to increase his endurance.     AM-PAC 6 CLICK MOBILITY  Total Score:17     Patient left up in chair with all lines intact, call button in reach, nsg notified and nsg and his wife present.    GOALS:   Multidisciplinary Problems     Physical Therapy Goals        Problem: Physical Therapy Goal    Goal Priority Disciplines Outcome Goal Variances Interventions   Physical Therapy Goal     PT, PT/OT Ongoing (interventions implemented as appropriate)     Description:  Goals to be met by: 5/10/19     Patient will increase functional independence with mobility by " performin. Supine to sit with Supervision  2. Sit to supine with Supervision  3. Sit to stand transfer with Supervision  4. Gait  x 200 feet with Stand-by Assistance using no AD.   5. Lower extremity standing exercise program x20 reps per handout, with supervision                      History:     Past Medical History:   Diagnosis Date    Alcoholic cirrhosis     Diabetes mellitus     Encounter for blood transfusion     HCC (hepatocellular carcinoma)     Portal hypertension        Past Surgical History:   Procedure Laterality Date    COLONOSCOPY N/A 2018    Performed by Romain Gongora MD at [facility] ENDO (4TH FLR)    EMBOLIZATION      ESOPHAGOGASTRODUODENOSCOPY (EGD) N/A 2018    Performed by Romain Gongora MD at [facility] ENDO (4TH FLR)    HERNIA REPAIR      umbilical hernia repair, large mesh placed.        Time Tracking:     PT Received On: 19  PT Start Time: 1055     PT Stop Time: 1117  PT Total Time (min): 22 min     Billable Minutes: Evaluation 22      Lori Macias, PT  2019

## 2019-04-29 NOTE — PLAN OF CARE
"Problem: Adult Inpatient Plan of Care  Goal: Plan of Care Review  Outcome: Ongoing (interventions implemented as appropriate)  Dx: Liver replaced by transplant    Shift Events: 500cc albumin given for decreased UOP and MAPs. OOBTC in AM    Neuro: AAO x4, Arouses to Voice, Follows Commands and Moves All Extremities    Vital Signs: BP (!) 150/81 (BP Location: Right arm, Patient Position: Lying)   Pulse 67   Temp 98.9 °F (37.2 °C) (Oral)   Resp (!) 27   Ht 5' 6" (1.676 m)   Wt 89.9 kg (198 lb 3.1 oz)   SpO2 97%   BMI 31.99 kg/m²     Diet: Cardiac Diet    Urine Output: Urinary Catheter  cc/hour    Drains: Chest Tube, total output 20 cc /  shift     Labs/Accuchecks: Q6 accuchecks    Skin: CDI; heels, sacrum and elbows without breakdown. Midsternal incision dressing CDI         "

## 2019-04-29 NOTE — SUBJECTIVE & OBJECTIVE
"Interval HPI:   Overnight events: BG elevated in the 400s overnight and IV intensive insulin protocol initiated. BG within goal ranges this morning and IV transition insulin infusion initiated at 6 units/hr with scheduled Novolog with meals. Receiving Solu Medrol 60 mg BID.  Eatin%  Nausea: No  Hypoglycemia and intervention: No  Fever: No  TPN and/or TF: No      BP (!) 148/77 (BP Location: Right arm, Patient Position: Sitting)   Pulse 73   Temp 98.9 °F (37.2 °C) (Oral)   Resp 15   Ht 5' 6" (1.676 m)   Wt 89.9 kg (198 lb 3.1 oz)   SpO2 99%   BMI 31.99 kg/m²     Labs Reviewed and Include    Recent Labs   Lab 19  0310   *   CALCIUM 7.7*   ALBUMIN 2.4*   PROT 4.7*   *   K 4.7   CO2 19*      BUN 22*   CREATININE 0.9   ALKPHOS 53*   *   *   BILITOT 1.7*     Lab Results   Component Value Date    WBC 4.85 2019    HGB 7.7 (L) 2019    HCT 21.1 (L) 2019    MCV 90 2019    PLT 35 (LL) 2019     No results for input(s): TSH, FREET4 in the last 168 hours.  Lab Results   Component Value Date    HGBA1C 6.9 (H) 2019       Nutritional status:   Body mass index is 31.99 kg/m².  Lab Results   Component Value Date    ALBUMIN 2.4 (L) 2019    ALBUMIN 2.9 (L) 2019    ALBUMIN 2.4 (L) 2019     No results found for: PREALBUMIN    Estimated Creatinine Clearance: 98.5 mL/min (based on SCr of 0.9 mg/dL).    Accu-Checks  Recent Labs     19  2142 19  2256 19  2346 19  0112 19  0206 19  0305 19  0407 19  0515 19  0617 19  0715   POCTGLUCOSE 433* 388* 360* 344* 317* 304* 271* 226* 164* 155*       Current Medications and/or Treatments Impacting Glycemic Control  Immunotherapy:    Immunosuppressants         Stop Route Frequency     tacrolimus (PROGRAF) 1 mg/mL oral syringe      -- Oral 2 times daily     mycophenolate mofetil 200 mg/mL suspension 1,000 mg      -- Oral 2 times daily    "     Steroids:   Hormones (From admission, onward)    Start     Stop Route Frequency Ordered    05/02/19 0900  predniSONE tablet 20 mg  (methylprednisolone taper panel)      -- Oral Daily 04/27/19 0001    05/01/19 0900  methylPREDNISolone sodium succinate injection 20 mg  (methylprednisolone taper panel)      05/02 0859 IV Every 12 hours 04/27/19 0001    04/30/19 0900  methylPREDNISolone sodium succinate injection 40 mg  (methylprednisolone taper panel)      05/01 0859 IV Every 12 hours 04/27/19 0001    04/29/19 0900  methylPREDNISolone sodium succinate injection 60 mg  (methylprednisolone taper panel)      04/30 0859 IV Every 12 hours 04/27/19 0001        Pressors:    Autonomic Drugs (From admission, onward)    None        Hyperglycemia/Diabetes Medications:   Antihyperglycemics (From admission, onward)    Start     Stop Route Frequency Ordered    04/29/19 1130  insulin aspart U-100 pen 4-6 Units      -- SubQ 3 times daily with meals 04/29/19 0757    04/29/19 0900  insulin regular (Humulin R) 100 Units in sodium chloride 0.9% 100 mL infusion      -- IV Continuous 04/29/19 0757    04/29/19 0857  insulin aspart U-100 pen 0-5 Units      -- SubQ As needed (PRN) 04/29/19 0757

## 2019-04-29 NOTE — PT/OT/SLP EVAL
Occupational Therapy   Evaluation    Name: Cesario Bailey  MRN: [id number]  Admitting Diagnosis:  Liver replaced by transplant 3 Days Post-Op    Recommendations:     Discharge Recommendations: home  Discharge Equipment Recommendations:  none  Barriers to discharge:  None    Assessment:     Cesario Bailey is a 53 y.o. male with a medical diagnosis of Liver replaced by transplant.  He presents with the following performance deficits affecting function: impaired endurance, impaired self care skills, impaired functional mobilty, gait instability, impaired balance.      OT eval complete. Pt tolerated session well. He presents w/ good B UE ROM and strength. Pt noted to have pain w/ deep breathing in slight trunk flexion. He requires CGA for functional mobility due to slight instability. He continues to benefit from 1-2 additional acute OT sessions to maximize his functional needs and safety prior to returning home.     Rehab Prognosis: Good; patient would benefit from acute skilled OT services to address these deficits and reach maximum level of function.       Plan:     Patient to be seen 3 x/week to address the above listed problems via self-care/home management, therapeutic activities, therapeutic exercises  · Plan of Care Expires: 05/27/19  · Plan of Care Reviewed with: patient, spouse    Subjective     Chief Complaint: none stated  Patient/Family Comments/goals: to return home    Occupational Profile:  Living Environment: Pt lives with his wife in 2SH w/ no RONIT; pt states he can reside on the 1st floor if necessary. He uses a walk-in shower. The stairs leading to second floor have a R HR support.   Previous level of function: PTA, pt reports being independent w/ ADLs and functional mobility  Equipment Used at Home:  none  Assistance upon Discharge: pt states he will have assistance from his wife.     Pain/Comfort:  · Pain Rating 1: (did not rate; reports pain w/ deep breathing)  · Location 1: abdomen  · Pain  Addressed 1: Reposition, Distraction, Nurse notified  · Pain Rating Post-Intervention 1: (did not rate)    Patients cultural, spiritual, Rastafarian conflicts given the current situation: no    Objective:     Communicated with: RN prior to session.  Patient found up in chair with arterial line, blood pressure cuff, peripheral IV, pulse ox (continuous), telemetry, central line upon OT entry to room.    General Precautions: Standard, fall   Orthopedic Precautions:N/A   Braces: N/A     Occupational Performance:    Bed Mobility:    · n/a    Functional Mobility/Transfers:  · Patient completed Sit <> Stand Transfer with contact guard assistance  with  no assistive device   · Functional Mobility: Pt ambulated ~150 ft w/ CGA and HHA.     Activities of Daily Living:  · Upper Body Dressing: minimum assistance donning back gown simulating jacket in standing     Cognitive/Visual Perceptual:  Cognitive/Psychosocial Skills:     -       Oriented to: Person, Place and Time   -       Follows Commands/attention:Follows multistep  commands  -       Communication: clear/fluent    Physical Exam:  Upper Extremity Range of Motion:     -       Right Upper Extremity: WFL  -       Left Upper Extremity: WFL  Upper Extremity Strength:    -       Right Upper Extremity: WFL  -       Left Upper Extremity: WFL   Strength:    -       Right Upper Extremity: WFL  -       Left Upper Extremity: WFL  Fine Motor Coordination:    -       Intact    AMPAC 6 Click ADL:  AMPAC Total Score: 18    Treatment & Education:  - OT role/POC  - Importance of OOB activity to maximize recovery   - safety w/ functional mobility; hand placement ensuring safe transfers  Education:    Patient left up in chair with all lines intact, call button in reach and RN and wife present    GOALS:   Multidisciplinary Problems     Occupational Therapy Goals        Problem: Occupational Therapy Goal    Goal Priority Disciplines Outcome Interventions   Occupational Therapy Goal     OT,  PT/OT Ongoing (interventions implemented as appropriate)    Description:  Goals to be met by: 5/10/2019     Patient will increase functional independence with ADLs by performing:    UE Dressing with Supervision.  LE Dressing with Supervision.  Grooming while standing at sink with Supervision.  Toileting from toilet with Supervision for hygiene and clothing management.   Toilet transfer to toilet with Supervision.                      History:     Past Medical History:   Diagnosis Date    Alcoholic cirrhosis     Diabetes mellitus     Encounter for blood transfusion     HCC (hepatocellular carcinoma)     Portal hypertension        Past Surgical History:   Procedure Laterality Date    COLONOSCOPY N/A 7/23/2018    Performed by Romain G[facility]ora MD at [facility] ENDO (4TH FLR)    EMBOLIZATION      ESOPHAGOGASTRODUODENOSCOPY (EGD) N/A 7/23/2018    Performed by Romain Gongora MD at [facility] ENDO (4TH FLR)    HERNIA REPAIR      umbilical hernia repair, large mesh placed.     TRANSPLANT, LIVER N/A 4/26/2019    Performed by Thien Villanueva MD at [facility] OR 2ND FLR       Time Tracking:     OT Date of Treatment: 04/29/19  OT Start Time: 1058  OT Stop Time: 1117  OT Total Time (min): 19 min    Billable Minutes:Evaluation 19    Doris Samuel, OT  4/29/2019

## 2019-04-29 NOTE — ANESTHESIA RELEASE NOTE
"Anesthesia Release from PACU Note    Patient: Cesario CarrLuhZelaya    Procedure(s) Performed: Procedure(s) (LRB):  TRANSPLANT, LIVER (N/A)    Anesthesia type: general    Post pain: Adequate analgesia    Post assessment: no apparent anesthetic complications    Last Vitals:   Visit Vitals  BP (!) 148/77 (BP Location: Right arm, Patient Position: Sitting)   Pulse 73   Temp 37.2 °C (98.9 °F) (Oral)   Resp 15   Ht 5' 6" (1.676 m)   Wt 89.9 kg (198 lb 3.1 oz)   SpO2 99%   BMI 31.99 kg/m²       Post vital signs: stable    Level of consciousness: awake, alert  and oriented    Nausea/Vomiting: no nausea/no vomiting    Complications: none    Airway Patency: patent    Respiratory: unassisted    Cardiovascular: stable and blood pressure at baseline    Hydration: euvolemic  "

## 2019-04-29 NOTE — PROGRESS NOTES
"[facility]  Endocrinology  Progress Note    Admit Date: 2019     Reason for Consult: Management of T2DM, Hyperglycemia     Surgical Procedure and Date: Liver Transplant 19    Diabetes diagnosis year: > 5 years ago  Lab Results   Component Value Date    HGBA1C 6.9 (H) 2019     Home Diabetes Medications:  Humulin 20 units TID with meals, Lantus 20-25 units q HS    How often checking glucose at home? in the AM/ when he feels bad   BG readings on regimen: 80s-100  Hypoglycemia on the regimen?  No  Missed doses on regimen?  No    Diabetes Complications include:     Hyperglycemia    Complicating diabetes co morbidities:   CIRRHOSIS      HPI:   Patient is a 53 y.o. male with a diagnosis of T2DM, etoh cirrhosis, portal HTN, esophageal varices, and ESLD who presented for liver transplant. Father with DM per chart review.  Endocrinology consulted for management of T2DM/hyperglycemia.                 Interval HPI:   Overnight events: BG elevated in the 400s overnight and IV intensive insulin protocol initiated. BG within goal ranges this morning and IV transition insulin infusion initiated at 6 units/hr with scheduled Novolog with meals. Receiving Solu Medrol 60 mg BID.  Eatin%  Nausea: No  Hypoglycemia and intervention: No  Fever: No  TPN and/or TF: No      BP (!) 148/77 (BP Location: Right arm, Patient Position: Sitting)   Pulse 73   Temp 98.9 °F (37.2 °C) (Oral)   Resp 15   Ht 5' 6" (1.676 m)   Wt 89.9 kg (198 lb 3.1 oz)   SpO2 99%   BMI 31.99 kg/m²      Labs Reviewed and Include    Recent Labs   Lab 19  0310   *   CALCIUM 7.7*   ALBUMIN 2.4*   PROT 4.7*   *   K 4.7   CO2 19*      BUN 22*   CREATININE 0.9   ALKPHOS 53*   *   *   BILITOT 1.7*     Lab Results   Component Value Date    WBC 4.85 2019    HGB 7.7 (L) 2019    HCT 21.1 (L) 2019    MCV 90 2019    PLT 35 (LL) 2019     No results for input(s): TSH, " FREET4 in the last 168 hours.  Lab Results   Component Value Date    HGBA1C 6.9 (H) 04/26/2019       Nutritional status:   Body mass index is 31.99 kg/m².  Lab Results   Component Value Date    ALBUMIN 2.4 (L) 04/29/2019    ALBUMIN 2.9 (L) 04/28/2019    ALBUMIN 2.4 (L) 04/27/2019     No results found for: PREALBUMIN    Estimated Creatinine Clearance: 98.5 mL/min (based on SCr of 0.9 mg/dL).    Accu-Checks  Recent Labs     04/28/19  2142 04/28/19  2256 04/28/19  2346 04/29/19  0112 04/29/19  0206 04/29/19  0305 04/29/19  0407 04/29/19  0515 04/29/19  0617 04/29/19  0715   POCTGLUCOSE 433* 388* 360* 344* 317* 304* 271* 226* 164* 155*       Current Medications and/or Treatments Impacting Glycemic Control  Immunotherapy:    Immunosuppressants         Stop Route Frequency     tacrolimus (PROGRAF) 1 mg/mL oral syringe      -- Oral 2 times daily     mycophenolate mofetil 200 mg/mL suspension 1,000 mg      -- Oral 2 times daily        Steroids:   Hormones (From admission, onward)    Start     Stop Route Frequency Ordered    05/02/19 0900  predniSONE tablet 20 mg  (methylprednisolone taper panel)      -- Oral Daily 04/27/19 0001    05/01/19 0900  methylPREDNISolone sodium succinate injection 20 mg  (methylprednisolone taper panel)      05/02 0859 IV Every 12 hours 04/27/19 0001    04/30/19 0900  methylPREDNISolone sodium succinate injection 40 mg  (methylprednisolone taper panel)      05/01 0859 IV Every 12 hours 04/27/19 0001    04/29/19 0900  methylPREDNISolone sodium succinate injection 60 mg  (methylprednisolone taper panel)      04/30 0859 IV Every 12 hours 04/27/19 0001        Pressors:    Autonomic Drugs (From admission, onward)    None        Hyperglycemia/Diabetes Medications:   Antihyperglycemics (From admission, onward)    Start     Stop Route Frequency Ordered    04/29/19 1130  insulin aspart U-100 pen 4-6 Units      -- SubQ 3 times daily with meals 04/29/19 0757    04/29/19 0900  insulin regular (Humulin R) 100  Units in sodium chloride 0.9% 100 mL infusion      -- IV Continuous 04/29/19 0757    04/29/19 0857  insulin aspart U-100 pen 0-5 Units      -- SubQ As needed (PRN) 04/29/19 0757          ASSESSMENT and PLAN    * Liver replaced by transplant  Post op day 4  Managed per primary team  Avoid hypoglycemia        Type 2 diabetes mellitus with hyperglycemia  BG goal 140 - 180     Start transition IV insulin infusion at 6 units/hr - (based on current intensive insulin infusion rates)  Novolog 4-6 units TID with meals (weight based using 0.5 modifier)  Low dose correction scale  BG monitoring /HS/0200    ** Please call Endocrine for any BG related issues   ** Please notify Endocrine for any change and/or advance in diet**    Discharge planning: TBD        Prophylactic immunotherapy  May increase insulin requirements      Adverse effect of corticosteroids  Glucocorticoids markedly increase glucoses.   May increase insulin requirements.        Class 1 obesity due to excess calories in adult  May increase insulin resistance.             Anat Leyva NP  Endocrinology  [facility]

## 2019-04-29 NOTE — CARE UPDATE
Called by nursing 9:37 PM  Glucose now >400 persistently    Discuss with nursing, Intensive insulin protocol restarted  Q1 POCT glucose  Clear liquid sugar free diet for now    To restart diet tomorrow once glucose is better control      Sukhi Gaxiola MD  Endocrinology Fellow  4/28/2019 9:38 PM

## 2019-04-29 NOTE — ASSESSMENT & PLAN NOTE
* Liver replaced by transplant  Cesario Bailey is a 53 y.o. male is s/p liver transplant on 4/27.  Pt has hx of alcoholic cirrhosis and HCC s/p tace x2. Extubated 4/27.      Neuro:   - Alert, oriented x 4  - Pain managed well with current regimen     Pulmonary:   - Extubated 4/27  - Satting well on RA  - Encourage pulmonary toilet     Cardiac:   - HDS without pressors     Renal:    - Post op BUN/Cr stable   - Monitor UOP  - 40mg Lasix PO daily     ID:   - Bactrim, Valganciclovir, Amp-Sulbactam  - Prednisone taper, Prograf      Hem/Onc:   - 1 PLT, 4 FFP, 1 Cryo, 1 pRBC intraoperatively  - Post op Hgb 8.6     Endocrine:    - BG >400 overnight, has normalized with insulin gtt  - Wean insulin gtt as able     Fluids/Electrolytes/Nutrition/GI:   - Replace electrolytes PRN  - Primary team to pull drain today     PPx:  - DVT: Heparin  - Bowel: Protonix     DISPO:  - Transplant team primary  - To floor      Sarah Collado MD CA-1

## 2019-04-29 NOTE — PROGRESS NOTES
[facility]  Transplant Surgery  Progress Note      Hospital Day Hospital Day: 4  Post-Op Day 3 Days Post-Op      ORGAN:   LIVER  Disease Etiology: Primary Liver Malignancy: Hepatoma (HCC) and Cirrhosis  Donor Type:    - Brain Death  CDC High Risk:   No  Donor CMV Status:   Donor CMV Status: Negative  Donor HBcAB:   Negative  Donor HCV Status:   Negative  Whole or Partial: Whole Liver  Biliary Anastomosis: End to End  Arterial Anatomy: Standard      Subjective:     Interval History:   NAEO.      Medications:  Continuous Infusions:   insulin (HUMAN R) infusion (adults) 6 Units/hr (19 0900)     Scheduled Meds:   [START ON 2019] acyclovir  400 mg Oral BID    docusate sodium  100 mg Oral TID    [START ON 2019] ergocalciferol  50,000 Units Oral Q7 Days    famotidine  20 mg Oral QHS    furosemide  40 mg Oral Daily    heparin (porcine)  5,000 Units Subcutaneous Q8H    insulin aspart U-100  4-6 Units Subcutaneous TIDWM    methylPREDNISolone sodium succinate  60 mg Intravenous Q12H    Followed by    [START ON 2019] methylPREDNISolone sodium succinate  40 mg Intravenous Q12H    Followed by    [START ON 2019] methylPREDNISolone sodium succinate  20 mg Intravenous Q12H    Followed by    [START ON 2019] predniSONE  20 mg Oral Daily    mycophenolate  1,000 mg Oral BID    nystatin  500,000 Units Mouth/Throat TID PC    sulfamethoxazole-trimethoprim 400-80mg  1 tablet Oral Daily    tacrolimus  2 mg Oral Once    tacrolimus  5 mg Oral BID     PRN Meds:sodium chloride, dextrose 50%, dextrose 50%, glucagon (human recombinant), glucose, glucose, hydrALAZINE, insulin aspart U-100, oxyCODONE, oxyCODONE     Objective:     Vital Signs (Most Recent):  Temp: 99.1 °F (37.3 °C) (19 1050)  Pulse: 70 (19 1100)  Resp: (!) 23 (19 1100)  BP: (!) 169/79 (19 1100)  SpO2: 98 % (19 1100) Vital Signs (24h Range):  Temp:  [98.6 °F (37 °C)-99.1 °F (37.3 °C)] 99.1  °F (37.3 °C)  Pulse:  [65-92] 70  Resp:  [12-29] 23  SpO2:  [96 %-100 %] 98 %  BP: (123-181)/(65-81) 169/79  Arterial Line BP: (136-202)/(55-85) 168/65       Intake/Output Summary (Last 24 hours) at 4/29/2019 1106  Last data filed at 4/29/2019 0930  Gross per 24 hour   Intake 2026.58 ml   Output 2221 ml   Net -194.42 ml       Neuro:  GCS: 15  Sedation:  no     Restraints:  no  Neurologic: Alert and oriented. Thought content appropriate  No focal numbness or weakness    Cardiac:      Temp:  [98.6 °F (37 °C)-99.1 °F (37.3 °C)]   Pulse:  [65-92]   Resp:  [12-29]   BP: (123-181)/(65-81)   SpO2:  [96 %-100 %]   Arterial Line BP: (136-202)/(55-85)   Heart: normal apical impulse and regularly irregular rhythm  Pulses: 2+ and symmetric    Pulmonary:        Recent Labs   Lab 04/27/19  0733   PH 7.418   PO2 168*   PCO2 35.6   HCO3 23.0*   BE -2       Lungs:  clear to auscultation bilaterally and normal respiratory effort  Chest X-ray: none    Renal:   I & O (Last 24H): Ins: 1.9L. UOP: 2L. Drain 260 dark sanguineous. Output 2.4. Net: -30    Intake/Output Summary (Last 24 hours) at 4/29/2019 1106  Last data filed at 4/29/2019 0930  Gross per 24 hour   Intake 2026.58 ml   Output 2221 ml   Net -194.42 ml     Recent Labs   Lab 04/27/19 1919 04/28/19  0305 04/28/19 2139 04/29/19  0310   BUN 23* 22*  --  22*   CREATININE 1.0 1.0  --  0.9   K 3.7 3.8 5.0 4.7     Recent Labs   Lab 04/27/19 1919 04/28/19  0305 04/28/19 2139 04/29/19  0310    138  --  132*   K 3.7 3.8 5.0 4.7    109  --  104   CO2 21* 22*  --  19*   CALCIUM 8.2* 8.0*  --  7.7*   * 95  --  265*     Recent Labs   Lab 04/26/19  2230 04/27/19  0005 04/27/19  1604   PHOS  --  2.9 4.8*   MG 1.7 1.7 2.0       FEN/GI  Recent Labs   Lab 04/27/19  0308  04/27/19  2338 04/28/19  0305 04/29/19  0310   BILITOT 3.1*  --   --  1.6* 1.7*   AST 11,714*  11,714*   < > 1,398* 1,212* 234*   ALT 2,224*  --   --  1,219* 765*   ALKPHOS 75  --   --  52* 53*   ALBUMIN  2.4*  --   --  2.9* 2.4*    < > = values in this interval not displayed.     Recent Labs   Lab 04/27/19  2338 04/28/19  0305 04/29/19  0310   INR 1.3* 1.2 1.1      Nutrition: PO: Full Regular  Abdomen: expected post-operative tenderness  GI ulcer Prophylaxis: no    HEME:   Recent Labs   Lab 04/28/19  0305 04/28/19  1305 04/29/19  0310   WBC 6.82 6.93 4.85   HGB 8.9* 8.7* 7.7*   HCT 25.3* 25.1* 21.1*   PLT 45* 34* 35*     DVT Prophylaxis: Pharmacologic yes  Mechanical: yes     Infectious Disease:  Temp:  [98.6 °F (37 °C)-99.1 °F (37.3 °C)]   Pulse:  [65-92]   Resp:  [12-29]   BP: (123-181)/(65-81)   SpO2:  [96 %-100 %]   Arterial Line BP: (136-202)/(55-85)   Antibiotics (From admission, onward)    Start     Stop Route Frequency Ordered    04/27/19 0800  sulfamethoxazole-trimethoprim 400-80mg per tablet 1 tablet      -- Oral Daily 04/27/19 0001            Significant Diagnostics:  I have reviewed all pertinent imaging results/findings within the past 24 hours.    Assessment/Plan:   Cesario Bailey is a 53 y.o. male is s/p liver transplant on 4/27.  Pt has hx of alcoholic cirrhosis and HCC s/p tace x2.        Neuro: Post-operative pain  - Continue current pain control regimen  - No sedation    Resp:  - Saturating 100% on RA  - IS    CV:  - HDS  - No current vasopressor requirement    Heme: Anemia  - Hgb/Hct 7.7/21.1 from 8.7/25  - Continue to trend  - No need to transfuse yet    ID:  - Afebrile  - WBC 4.8 from 6.9  - Bactrim, Valganciclovir, Amp-Sulbactam  - Prednisone taper, Prograf     Immuno: Immunocompromised state  - Continue current immunosuppression regimen  - Daily Prograf level  - Continue opportunistic infection prophylaxis    Renal:  - Draper in place  - Will remove today  - Strict I/Os    FEN/GI: ESLD s/p OLT, transaminitis, protein calorie malnutrition  - Liver U/S will repeat today  - Will remove drains as appropriate  - Monitor abdominal drain output and character  - Trend LFTs  - Replace lytes  PRN    Endo: Hyperglycemia  - Endocrine following, appreciate assistance  - Accuchecks  - Insulin gtt    Dispo:  - Patient appropriate for transfer to the floor. Will remove abdominal drain. Will remove rick. Give platelets 1 unit and remove radial A-line.

## 2019-04-29 NOTE — PROGRESS NOTES
Admit/Transplant Note     Met with patient (pt) and pt's wife to assess needs. SW received assistance with Yakut translation via telephone from [employer].  Patient is a 53 y.o.  male, admitted for for liver transplant.      Patient admitted as a direct admit on 4/26/2019 .  At this time, patient presents as alert and oriented x 4, pleasant, good eye contact, recall good, concentration/judgement good, average intelligence, calm, communicative, cooperative and asking and answering questions appropriately.  At this time, patients caregiver presents as alert and oriented x 4, pleasant, good eye contact, well groomed, recall good, concentration/judgement good, average intelligence, calm, communicative, cooperative and asking and answering questions appropriately.    Household/Family Systems     Patient resides with patient's wife, pt's daughter and pt's grandson at [address] [place] [address].  Pt currently staying local at the [facility].  Support system includes pt's wife, Rd, pt's daughter, Kellie, and pt's mother in law, Jessika.  Patient does not have dependents that are need of being cared for.     Patients primary caregiver is Rd Rogers, patients wife, phone number [phone].  Confirmed patients contact information is [phone] (home);   Telephone Information:   Mobile [phone]   .    During admission, patient's caregiver plans to stay at the [facility].  Confirmed patient and patients caregivers do have access to reliable transportation via taxi/uber services.    Cognitive Status/Learning     Patient reports reading ability as 12th grade and states patient does have difficulty with speaking/understanding English as Yakut is the pt and pt's wife's primary language.  Patient reports patient learns best by verbal, reading and demonstration in Yakut.   Needed: Yes; primary language is   Icelandic.  Patient's caregiver informed of  services available and how to access services..   Highest education level: High School (9-12) or GED; 12th grade    Vocation/Disability   .  Working for Income: yes  If yes, working activity level: Working Full Time  Patient is employed as a  with the [place] [employer] where pt has been employed for 32 years.  Pt is utilizing vacation/sick time to which pt had accumulated over 60 days of vacation and will receives 18 free days provided by pt's employer.    Adherence     Patient reports a high level of adherence to patients health care regimen.  Adherence counseling and education provided. Patient verbalizes understanding.    Substance Use    Patient reports the following substance usage.    Tobacco: none, patient denies any use.  Alcohol: none, patient denies any use. Pt reports pt's last use of ETOH as 5.5 years ago.  Illicit Drugs/Non-prescribed Medications: none, patient denies any use.  Patient states clear understanding of the potential impact of substance use.  Substance abstinence/cessation counseling, education and resources provided and reviewed.     Services Utilizing/ADLS    Infusion Service: Prior to admission, patient utilizing? no  Home Health: Prior to admission, patient utilizing? no  DME: Prior to admission, no  Pulmonary/Cardiac Rehab: Prior to admission, no  Dialysis:  Prior to admission, no  Transplant Specialty Pharmacy:  Prior to admission, no. However, pt stated pt will utilize [facility] while local.  Pt would like education from Pharm D with regards to obtaining medications once pt returns to [place].  SW will advise Pharm D appropriately.      Prior to admission, patient reports patient was independent with ADLS and was driving.  Patient reports patient is not able to care for self at this time due to compromised medical condition (as documented in medical record) and physical weakness..  Patient  indicates a willingness to care for self once medically cleared to do so.    Insurance/Medications    Insured by   Payor/Plan Subscr  Sex Relation Sub. Ins. ID Effective Group Num   1. [employer]* HE VO 1965 Male  [id number]                                    ATTN:MEENU MARES, [address]      Primary Insurance (for UNOS reporting): Private Insurance  Secondary Insurance (for UNOS reporting): Public Insurance - Other Government-[place] Medicaid     Patient reports patient is able to obtain and afford medications at this time and at time of discharge.    Living Will/Healthcare Power of     Patient states patient has a LW and/or HCPA.   provided education regarding LW and HCPA and the completion of forms.    Coping/Mental Health    Patient is coping adequately with the aid of  family members and friends.  Pt denied any coping issues or concerns.  Patient denies mental health difficulties.     Discharge Planning    At time of discharge, patient plans to return to [facility] under the care of pt's wife.  Patients wife will transport patient.  Per rounds today, expected discharge date has not been medically determined at this time. Patient and patients caregiver  verbalize understanding and are involved in treatment planning and discharge process.    Additional Concerns    Patient's caretaker denies additional needs and/or concerns at this time. Patient is being followed for needs, education, resources, information, emotional support, supportive counseling, and for supportive and skilled discharge plan of care.  providing ongoing psychosocial support, education, resources and d/c planning as needed.  SW remains available.  remains available. Patient's caregiver verbalizes understanding and agreement with information reviewed,  availability and how to access available resources as needed.  Patient denies additional needs and/or concerns at this time. Patient verbalizes understanding and agreement with information reviewed, social work availability, and how to access available resources as needed. Pharm D will be notified by GENI that the pt would like to receive education regarding medications once pt is released to return back to [place].  Pt inquired about possible financial assistance should the pt not be able to afford medications.  SW advised financial assistance was not available and verified if the pt had been given information previously regarding fundraising.  Pt stated pt did receive fundraising information; however, fundraising had not been completed.  SW encouraged the pt to discuss financial assistance with pt's family/friends should pt identify a need for assistance.  Pt stated pt had spoken with Tyesha of the International Department prior to transplant and she had advised the pt everything would be covered by pt's insurance (transplant and medication).  SW remains available for education and/or appropriate resources upon need.

## 2019-04-29 NOTE — TELEPHONE ENCOUNTER
(GENI) called Pharm STEVEN Figueroa to ask that she visit with the patient (pt) to discuss medication coverage due to pt's concerns.  Carolina advised she would visit with the pt; however, advised GENI the pt's immunos would only be covered for 90 days and could be filled with [facility].  As for any other medications, pt may need to utilize [facility] or [facility].  Carolina needed to investigate further.  Pt's deductible is $1200 which could be exhausted rapidly should the pt require any insuline, needles, etc.  GENI advised Carolina she would need to acquire a  to discuss concerns with pt.  SW remains available.

## 2019-04-29 NOTE — PROGRESS NOTES
Subjective/Objective  Follow-up For: Procedure(s) (LRB):  TRANSPLANT, LIVER (N/A)    Post-Operative Day: 4 Day Post-Op    Interval Events: BG > 400 overnight, insulin gtt restarted, diet changed to sugar free CLD. Otherwise, NAEO.     Past Medical History:   Diagnosis Date    Alcoholic cirrhosis     Diabetes mellitus     Encounter for blood transfusion     HCC (hepatocellular carcinoma)     Portal hypertension        Past Surgical History:   Procedure Laterality Date    COLONOSCOPY N/A 7/23/2018    Performed by [facility] at [facility] ([facility]TH [facility])    EMBOLIZATION      ESOPHAGOGASTRODUODENOSCOPY (EGD) N/A 7/23/2018    Performed by [facility]ain [facility]ora MD at [facility] ENDO (4TH FLR)    HERNIA REPAIR      umbilical hernia repair, large mesh placed.        Review of patient's allergies indicates:   Allergen Reactions    Aspirin        Family History     Problem Relation (Age of Onset)    Diabetes Father    Heart disease Mother    Hyperlipidemia Mother    Hypertension Mother    Stroke Mother        Tobacco Use    Smoking status: Never Smoker    Smokeless tobacco: Never Used   Substance and Sexual Activity    Alcohol use: Never     Frequency: Never    Drug use: Never    Sexual activity: Not on file      Review of Systems  Objective:     Vital Signs (Most Recent):  Temp: 98.9 °F (37.2 °C) (04/29/19 0700)  Pulse: 73 (04/29/19 0800)  Resp: 19 (04/29/19 0800)  BP: (!) 165/77 (04/29/19 0800)  SpO2: 100 % (04/29/19 0800) Vital Signs (24h Range):  Temp:  [98.6 °F (37 °C)-99.1 °F (37.3 °C)] 98.9 °F (37.2 °C)  Pulse:  [] 73  Resp:  [13-29] 19  SpO2:  [96 %-100 %] 100 %  BP: (123-181)/(65-81) 165/77  Arterial Line BP: (131-186)/(55-73) 172/64     Weight: 89.9 kg (198 lb 3.1 oz)  Body mass index is 31.99 kg/m².      Intake/Output Summary (Last 24 hours) at 4/29/2019 0824  Last data filed at 4/29/2019 0800  Gross per 24 hour   Intake 2026.58 ml   Output 2331 ml   Net -304.42 ml       Physical Exam   Constitutional:  He is oriented to person, place, and time. He appears well-developed and well-nourished.   HENT:   Head: Normocephalic and atraumatic.   RIJ in place, no erythema or drainage   Eyes: Right eye exhibits no discharge. Left eye exhibits no discharge.   Neck: Normal range of motion. Neck supple.   Cardiovascular: Normal rate and regular rhythm.   Pulmonary/Chest: Effort normal. No respiratory distress.   Abdominal:   Non-distended.  Chevron incision present and c/d/i.  2 JANICE drains with SS output.   Musculoskeletal: Normal range of motion.   Neurological: He is alert and oriented to person, place, and time.   Skin: Skin is warm and dry.   Psychiatric: He has a normal mood and affect. His behavior is normal.   Vitals reviewed.      Lines/Drains/Airways     Central Venous Catheter Line                 Trialysis (Dialysis) Catheter 04/26/19 1805 right internal jugular 2 days          Drain                 Closed/Suction Drain Right Abdomen Bulb 19 Fr. -- days         Urethral Catheter 04/26/19 1756 Non-latex;Straight-tip 16 Fr. 2 days          Arterial Line                 Arterial Line 04/26/19 1744 Left Radial 2 days          Peripheral Intravenous Line                 Peripheral IV - Single Lumen 04/26/19 1353 18 G Left Forearm 2 days                Significant Labs:    CBC/Anemia Profile:  Recent Labs   Lab 04/28/19  0305 04/28/19  1305 04/29/19  0310   WBC 6.82 6.93 4.85   HGB 8.9* 8.7* 7.7*   HCT 25.3* 25.1* 21.1*   PLT 45* 34* 35*   MCV 93 94 90   RDW 14.9* 14.9* 14.2        Chemistries:  Recent Labs   Lab 04/27/19  1604 04/27/19  1919 04/27/19  2338 04/28/19  0305 04/28/19  2139 04/29/19  0310   NA  --  138  --  138  --  132*   K  --  3.7  --  3.8 5.0 4.7   CL  --  107  --  109  --  104   CO2  --  21*  --  22*  --  19*   BUN  --  23*  --  22*  --  22*   CREATININE  --  1.0  --  1.0  --  0.9   CALCIUM  --  8.2*  --  8.0*  --  7.7*   ALBUMIN  --   --   --  2.9*  --  2.4*   PROT  --   --   --  4.9*  --  4.7*   BILITOT   --   --   --  1.6*  --  1.7*   ALKPHOS  --   --   --  52*  --  53*   ALT  --   --   --  1,219*  --  765*   AST  --  2,061* 1,398* 1,212*  --  234*   MG 2.0  --   --   --   --   --    PHOS 4.8*  --   --   --   --   --        Significant Imaging: I have reviewed all pertinent imaging results/findings within the past 24 hours.      Scheduled Meds:   famotidine (PF)  20 mg Intravenous Q12H    furosemide  40 mg Oral Daily    heparin (porcine)  5,000 Units Subcutaneous Q8H    insulin aspart U-100  4-6 Units Subcutaneous TIDWM    methylPREDNISolone sodium succinate  60 mg Intravenous Q12H    Followed by    [START ON 4/30/2019] methylPREDNISolone sodium succinate  40 mg Intravenous Q12H    Followed by    [START ON 5/1/2019] methylPREDNISolone sodium succinate  20 mg Intravenous Q12H    Followed by    [START ON 5/2/2019] predniSONE  20 mg Oral Daily    mycophenolate mofetil  1,000 mg Oral BID    nystatin  500,000 Units Mouth/Throat TID PC    sulfamethoxazole-trimethoprim 400-80mg  1 tablet Oral Daily    tacrolimus  3 mg Oral BID    [START ON 5/6/2019] valGANciclovir  450 mg Oral Daily     Continuous Infusions:   insulin (HUMAN R) infusion (adults) 6 Units/hr (04/29/19 0804)    lactated ringers 100 mL/hr at 04/29/19 0800    propofol Stopped (04/27/19 0900)     PRN Meds:sodium chloride, sodium chloride, dextrose 50%, dextrose 50%, fentaNYL, glucagon (human recombinant), glucose, glucose, hydrALAZINE, insulin aspart U-100, oxyCODONE, oxyCODONE    Vital signs in last 24 hours:  Temp:  [98.6 °F (37 °C)-99.1 °F (37.3 °C)] 98.9 °F (37.2 °C)  Pulse:  [65-97] 69  Resp:  [13-29] 17  SpO2:  [96 %-100 %] 99 %  BP: (123-181)/(65-81) 162/78  Arterial Line BP: (131-186)/(55-73) 176/67    Intake/Output last 3 shifts:  I/O last 3 completed shifts:  In: 3551.2 [P.O.:240; I.V.:3311.2]  Out: 2931 [Urine:2530; Drains:401]  Intake/Output this shift:  I/O this shift:  In: -   Out: 60 [Urine:60]    Problem Assessment/Plan  GI  *  Liver replaced by transplant  Assessment & Plan  * Liver replaced by transplant  Cesario Bailey is a 53 y.o. male is s/p liver transplant on 4/27.  Pt has hx of alcoholic cirrhosis and HCC s/p tace x2. Extubated 4/27.      Neuro:   - Alert, oriented x 4  - Pain managed well with current regimen     Pulmonary:   - Extubated 4/27  - Satting well on RA  - Encourage pulmonary toilet     Cardiac:   - HDS without pressors     Renal:    - Post op BUN/Cr stable   - Monitor UOP  - 40mg Lasix PO daily     ID:   - Bactrim, Valganciclovir, Amp-Sulbactam  - Prednisone taper, Prograf      Hem/Onc:   - 1 PLT, 4 FFP, 1 Cryo, 1 pRBC intraoperatively  - Post op Hgb 8.6     Endocrine:    - BG >400 overnight, has normalized with insulin gtt  - Wean insulin gtt as able     Fluids/Electrolytes/Nutrition/GI:   - Replace electrolytes PRN  - Primary team to pull drain today     PPx:  - DVT: Heparin  - Bowel: Protonix     DISPO:  - Transplant team primary  - To floor      Sarah Collado MD CA-1

## 2019-04-29 NOTE — PROGRESS NOTES
Pt glucose >400 last two checks. Given coverage. Notified Dr. Gaxiola, endocrine fellow. Pt restarted on intensive insulin gtt with q1hr glucose checks. Diet changed to sugar-free clear liquids.

## 2019-04-29 NOTE — ASSESSMENT & PLAN NOTE
BG goal 140 - 180     Start transition IV insulin infusion at 6 units/hr - (based on current intensive insulin infusion rates)  Novolog 4-6 units TID with meals (weight based using 0.5 modifier)  Low dose correction scale  BG monitoring AC/HS/0200    ** Please call Endocrine for any BG related issues   ** Please notify Endocrine for any change and/or advance in diet**    Discharge planning: TBD

## 2019-04-29 NOTE — PLAN OF CARE
Problem: Physical Therapy Goal  Goal: Physical Therapy Goal  Goals to be met by: 5/10/19     Patient will increase functional independence with mobility by performin. Supine to sit with Supervision  2. Sit to supine with Supervision  3. Sit to stand transfer with Supervision  4. Gait  x 200 feet with Stand-by Assistance using no AD.   5. Lower extremity standing exercise program x20 reps per handout, with supervision    Outcome: Ongoing (interventions implemented as appropriate)  PT Eval completed and POC initiated.  Pt did well today and should cont to progress well with PT. Pt will benefit from PT to maximize his functional (I) with mobility and encourage pt to amb with nsg or family and CGA to increase his endurance and OOB activity.    DME: no needs expected  D/C: home with family

## 2019-04-29 NOTE — SUBJECTIVE & OBJECTIVE
Follow-up For: Procedure(s) (LRB):  TRANSPLANT, LIVER (N/A)    Post-Operative Day: 4 Day Post-Op    Interval Events: BG > 400 overnight, insulin gtt restarted, diet changed to sugar free CLD. Otherwise, NAEO.     Past Medical History:   Diagnosis Date    Alcoholic cirrhosis     Diabetes mellitus     Encounter for blood transfusion     HCC (hepatocellular carcinoma)     Portal hypertension        Past Surgical History:   Procedure Laterality Date    COLONOSCOPY N/A 7/23/2018    [facility] at [facility] (4TH FLR)    EMBOLIZATION      ESOPHAGOGASTRODUODENOSCOPY (EGD) N/A 7/23/2018    Performed by [facility]ain [facility]ora MD at [facility] ENDO (4TH FLR)    HERNIA REPAIR      umbilical hernia repair, large mesh placed.        Review of patient's allergies indicates:   Allergen Reactions    Aspirin        Family History     Problem Relation (Age of Onset)    Diabetes Father    Heart disease Mother    Hyperlipidemia Mother    Hypertension Mother    Stroke Mother        Tobacco Use    Smoking status: Never Smoker    Smokeless tobacco: Never Used   Substance and Sexual Activity    Alcohol use: Never     Frequency: Never    Drug use: Never    Sexual activity: Not on file      Review of Systems  Objective:     Vital Signs (Most Recent):  Temp: 98.9 °F (37.2 °C) (04/29/19 0700)  Pulse: 73 (04/29/19 0800)  Resp: 19 (04/29/19 0800)  BP: (!) 165/77 (04/29/19 0800)  SpO2: 100 % (04/29/19 0800) Vital Signs (24h Range):  Temp:  [98.6 °F (37 °C)-99.1 °F (37.3 °C)] 98.9 °F (37.2 °C)  Pulse:  [] 73  Resp:  [13-29] 19  SpO2:  [96 %-100 %] 100 %  BP: (123-181)/(65-81) 165/77  Arterial Line BP: (131-186)/(55-73) 172/64     Weight: 89.9 kg (198 lb 3.1 oz)  Body mass index is 31.99 kg/m².      Intake/Output Summary (Last 24 hours) at 4/29/2019 0824  Last data filed at 4/29/2019 0800  Gross per 24 hour   Intake 2026.58 ml   Output 2331 ml   Net -304.42 ml       Physical Exam   Constitutional: He is oriented to  person, place, and time. He appears well-developed and well-nourished.   HENT:   Head: Normocephalic and atraumatic.   RIJ in place, no erythema or drainage   Eyes: Right eye exhibits no discharge. Left eye exhibits no discharge.   Neck: Normal range of motion. Neck supple.   Cardiovascular: Normal rate and regular rhythm.   Pulmonary/Chest: Effort normal. No respiratory distress.   Abdominal:   Non-distended.  Chevron incision present and c/d/i.  2 JANICE drains with SS output.   Musculoskeletal: Normal range of motion.   Neurological: He is alert and oriented to person, place, and time.   Skin: Skin is warm and dry.   Psychiatric: He has a normal mood and affect. His behavior is normal.   Vitals reviewed.      Lines/Drains/Airways     Central Venous Catheter Line                 Trialysis (Dialysis) Catheter 04/26/19 1805 right internal jugular 2 days          Drain                 Closed/Suction Drain Right Abdomen Bulb 19 Fr. -- days         Urethral Catheter 04/26/19 1756 Non-latex;Straight-tip 16 Fr. 2 days          Arterial Line                 Arterial Line 04/26/19 1744 Left Radial 2 days          Peripheral Intravenous Line                 Peripheral IV - Single Lumen 04/26/19 1353 18 G Left Forearm 2 days                Significant Labs:    CBC/Anemia Profile:  Recent Labs   Lab 04/28/19  0305 04/28/19  1305 04/29/19  0310   WBC 6.82 6.93 4.85   HGB 8.9* 8.7* 7.7*   HCT 25.3* 25.1* 21.1*   PLT 45* 34* 35*   MCV 93 94 90   RDW 14.9* 14.9* 14.2        Chemistries:  Recent Labs   Lab 04/27/19  1604 04/27/19  1919 04/27/19  2338 04/28/19  0305 04/28/19  2139 04/29/19  0310   NA  --  138  --  138  --  132*   K  --  3.7  --  3.8 5.0 4.7   CL  --  107  --  109  --  104   CO2  --  21*  --  22*  --  19*   BUN  --  23*  --  22*  --  22*   CREATININE  --  1.0  --  1.0  --  0.9   CALCIUM  --  8.2*  --  8.0*  --  7.7*   ALBUMIN  --   --   --  2.9*  --  2.4*   PROT  --   --   --  4.9*  --  4.7*   BILITOT  --   --   --   1.6*  --  1.7*   ALKPHOS  --   --   --  52*  --  53*   ALT  --   --   --  1,219*  --  765*   AST  --  2,061* 1,398* 1,212*  --  234*   MG 2.0  --   --   --   --   --    PHOS 4.8*  --   --   --   --   --        Significant Imaging: I have reviewed all pertinent imaging results/findings within the past 24 hours.

## 2019-04-29 NOTE — ANESTHESIA POSTPROCEDURE EVALUATION
Anesthesia Post Evaluation    Patient: Cesario Bailey    Procedure(s) Performed: Procedure(s) (LRB):  TRANSPLANT, LIVER (N/A)    Final Anesthesia Type: general  Patient location during evaluation: PACU  Patient participation: Yes- Able to Participate  Level of consciousness: awake and alert  Post-procedure vital signs: reviewed and stable  Pain management: adequate  Airway patency: patent  PONV status at discharge: No PONV  Anesthetic complications: no      Cardiovascular status: blood pressure returned to baseline  Respiratory status: unassisted  Hydration status: euvolemic  Follow-up not needed.          Vitals Value Taken Time   /74 4/29/2019  9:32 AM   Temp 37.2 °C (98.9 °F) 4/29/2019  7:00 AM   Pulse 74 4/29/2019  9:49 AM   Resp 16 4/29/2019  9:49 AM   SpO2 96 % 4/29/2019  9:49 AM   Vitals shown include unvalidated device data.      No case tracking events are documented in the log.      Pain/Madiha Score: Pain Rating Prior to Med Admin: 8 (4/29/2019  2:02 AM)  Pain Rating Post Med Admin: 2 (4/28/2019  6:32 PM)

## 2019-04-30 ENCOUNTER — ANESTHESIA EVENT (OUTPATIENT)
Dept: SURGERY | Facility: HOSPITAL | Age: 54
DRG: 005 | End: 2019-04-30
Payer: COMMERCIAL

## 2019-04-30 PROBLEM — D62 ACUTE BLOOD LOSS ANEMIA: Status: ACTIVE | Noted: 2019-04-30

## 2019-04-30 PROBLEM — E87.20 ACIDOSIS: Status: ACTIVE | Noted: 2019-04-30

## 2019-04-30 PROBLEM — E83.42 HYPOMAGNESEMIA: Status: ACTIVE | Noted: 2019-04-30

## 2019-04-30 PROBLEM — K72.90 LIVER FAILURE: Status: RESOLVED | Noted: 2019-04-28 | Resolved: 2019-04-30

## 2019-04-30 PROBLEM — D63.8 ANEMIA OF CHRONIC DISEASE: Status: ACTIVE | Noted: 2019-04-30

## 2019-04-30 PROBLEM — D68.4 ACQUIRED COAGULATION FACTOR DEFICIENCY: Status: ACTIVE | Noted: 2019-04-30

## 2019-04-30 LAB
ALBUMIN SERPL BCP-MCNC: 2.6 G/DL (ref 3.5–5.2)
ALP SERPL-CCNC: 93 U/L (ref 55–135)
ALT SERPL W/O P-5'-P-CCNC: 536 U/L (ref 10–44)
ANION GAP SERPL CALC-SCNC: 7 MMOL/L (ref 8–16)
APTT BLDCRRT: 24.1 SEC (ref 21–32)
AST SERPL-CCNC: 120 U/L (ref 10–40)
BASOPHILS # BLD AUTO: 0 K/UL (ref 0–0.2)
BASOPHILS NFR BLD: 0 % (ref 0–1.9)
BILIRUB DIRECT SERPL-MCNC: 1 MG/DL (ref 0.1–0.3)
BILIRUB SERPL-MCNC: 1.6 MG/DL (ref 0.1–1)
BLD PROD TYP BPU: NORMAL
BLOOD UNIT EXPIRATION DATE: NORMAL
BLOOD UNIT TYPE CODE: 6200
BLOOD UNIT TYPE: NORMAL
BUN SERPL-MCNC: 24 MG/DL (ref 6–20)
CALCIUM SERPL-MCNC: 8.2 MG/DL (ref 8.7–10.5)
CHLORIDE SERPL-SCNC: 102 MMOL/L (ref 95–110)
CO2 SERPL-SCNC: 23 MMOL/L (ref 23–29)
CODING SYSTEM: NORMAL
CREAT SERPL-MCNC: 0.9 MG/DL (ref 0.5–1.4)
DIFFERENTIAL METHOD: ABNORMAL
DISPENSE STATUS: NORMAL
EOSINOPHIL # BLD AUTO: 0 K/UL (ref 0–0.5)
EOSINOPHIL NFR BLD: 1 % (ref 0–8)
ERYTHROCYTE [DISTWIDTH] IN BLOOD BY AUTOMATED COUNT: 14.1 % (ref 11.5–14.5)
EST. GFR  (AFRICAN AMERICAN): >60 ML/MIN/1.73 M^2
EST. GFR  (NON AFRICAN AMERICAN): >60 ML/MIN/1.73 M^2
GGT SERPL-CCNC: 441 U/L (ref 8–55)
GLUCOSE SERPL-MCNC: 128 MG/DL (ref 70–110)
HBV DNA SERPL NAA+PROBE-ACNC: <10 IU/ML
HBV DNA SERPL NAA+PROBE-LOG IU: <1 LOG (10) IU/ML
HBV DNA SERPL QL NAA+PROBE: NOT DETECTED
HCT VFR BLD AUTO: 21.3 % (ref 40–54)
HGB BLD-MCNC: 7.6 G/DL (ref 14–18)
IMM GRANULOCYTES # BLD AUTO: 0.02 K/UL (ref 0–0.04)
IMM GRANULOCYTES NFR BLD AUTO: 0.5 % (ref 0–0.5)
INR PPP: 1.1 (ref 0.8–1.2)
LYMPHOCYTES # BLD AUTO: 0.2 K/UL (ref 1–4.8)
LYMPHOCYTES NFR BLD: 5 % (ref 18–48)
MAGNESIUM SERPL-MCNC: 1.5 MG/DL (ref 1.6–2.6)
MCH RBC QN AUTO: 33.5 PG (ref 27–31)
MCHC RBC AUTO-ENTMCNC: 35.7 G/DL (ref 32–36)
MCV RBC AUTO: 94 FL (ref 82–98)
MONOCYTES # BLD AUTO: 0.2 K/UL (ref 0.3–1)
MONOCYTES NFR BLD: 4.8 % (ref 4–15)
NEUTROPHILS # BLD AUTO: 3.5 K/UL (ref 1.8–7.7)
NEUTROPHILS NFR BLD: 88.7 % (ref 38–73)
NRBC BLD-RTO: 0 /100 WBC
PHOSPHATE SERPL-MCNC: 3.7 MG/DL (ref 2.7–4.5)
PLATELET # BLD AUTO: 49 K/UL (ref 150–350)
PMV BLD AUTO: 11.6 FL (ref 9.2–12.9)
POCT GLUCOSE: 108 MG/DL (ref 70–110)
POCT GLUCOSE: 108 MG/DL (ref 70–110)
POCT GLUCOSE: 137 MG/DL (ref 70–110)
POCT GLUCOSE: 149 MG/DL (ref 70–110)
POCT GLUCOSE: 151 MG/DL (ref 70–110)
POCT GLUCOSE: 190 MG/DL (ref 70–110)
POCT GLUCOSE: 99 MG/DL (ref 70–110)
POTASSIUM SERPL-SCNC: 4 MMOL/L (ref 3.5–5.1)
PROT SERPL-MCNC: 5.1 G/DL (ref 6–8.4)
PROTHROMBIN TIME: 11.2 SEC (ref 9–12.5)
RBC # BLD AUTO: 2.27 M/UL (ref 4.6–6.2)
SODIUM SERPL-SCNC: 132 MMOL/L (ref 136–145)
TACROLIMUS BLD-MCNC: 2.8 NG/ML (ref 5–15)
TRANS ERYTHROCYTES VOL PATIENT: NORMAL ML
WBC # BLD AUTO: 3.99 K/UL (ref 3.9–12.7)

## 2019-04-30 PROCEDURE — 63600175 PHARM REV CODE 636 W HCPCS: Performed by: TRANSPLANT SURGERY

## 2019-04-30 PROCEDURE — 86920 COMPATIBILITY TEST SPIN: CPT

## 2019-04-30 PROCEDURE — 25000003 PHARM REV CODE 250: Performed by: NURSE PRACTITIONER

## 2019-04-30 PROCEDURE — 85730 THROMBOPLASTIN TIME PARTIAL: CPT

## 2019-04-30 PROCEDURE — 20600001 HC STEP DOWN PRIVATE ROOM

## 2019-04-30 PROCEDURE — 80053 COMPREHEN METABOLIC PANEL: CPT

## 2019-04-30 PROCEDURE — 84100 ASSAY OF PHOSPHORUS: CPT

## 2019-04-30 PROCEDURE — 99233 SBSQ HOSP IP/OBS HIGH 50: CPT | Mod: 24,,, | Performed by: PHYSICIAN ASSISTANT

## 2019-04-30 PROCEDURE — 25000003 PHARM REV CODE 250: Performed by: TRANSPLANT SURGERY

## 2019-04-30 PROCEDURE — 63600175 PHARM REV CODE 636 W HCPCS: Performed by: PHYSICIAN ASSISTANT

## 2019-04-30 PROCEDURE — 86644 CMV ANTIBODY: CPT

## 2019-04-30 PROCEDURE — 82977 ASSAY OF GGT: CPT

## 2019-04-30 PROCEDURE — 63600175 PHARM REV CODE 636 W HCPCS

## 2019-04-30 PROCEDURE — 99232 PR SUBSEQUENT HOSPITAL CARE,LEVL II: ICD-10-PCS | Mod: ,,, | Performed by: NURSE PRACTITIONER

## 2019-04-30 PROCEDURE — 85025 COMPLETE CBC W/AUTO DIFF WBC: CPT

## 2019-04-30 PROCEDURE — 80197 ASSAY OF TACROLIMUS: CPT

## 2019-04-30 PROCEDURE — 82248 BILIRUBIN DIRECT: CPT

## 2019-04-30 PROCEDURE — 36415 COLL VENOUS BLD VENIPUNCTURE: CPT

## 2019-04-30 PROCEDURE — 25000003 PHARM REV CODE 250: Performed by: STUDENT IN AN ORGANIZED HEALTH CARE EDUCATION/TRAINING PROGRAM

## 2019-04-30 PROCEDURE — 99232 SBSQ HOSP IP/OBS MODERATE 35: CPT | Mod: ,,, | Performed by: NURSE PRACTITIONER

## 2019-04-30 PROCEDURE — 83735 ASSAY OF MAGNESIUM: CPT

## 2019-04-30 PROCEDURE — 63600175 PHARM REV CODE 636 W HCPCS: Performed by: NURSE PRACTITIONER

## 2019-04-30 PROCEDURE — 85610 PROTHROMBIN TIME: CPT

## 2019-04-30 PROCEDURE — 63600175 PHARM REV CODE 636 W HCPCS: Performed by: STUDENT IN AN ORGANIZED HEALTH CARE EDUCATION/TRAINING PROGRAM

## 2019-04-30 PROCEDURE — 25000003 PHARM REV CODE 250: Performed by: PHYSICIAN ASSISTANT

## 2019-04-30 PROCEDURE — 99233 PR SUBSEQUENT HOSPITAL CARE,LEVL III: ICD-10-PCS | Mod: 24,,, | Performed by: PHYSICIAN ASSISTANT

## 2019-04-30 RX ORDER — SIMETHICONE 80 MG
1 TABLET,CHEWABLE ORAL 3 TIMES DAILY PRN
Status: DISCONTINUED | OUTPATIENT
Start: 2019-04-30 | End: 2019-05-04 | Stop reason: HOSPADM

## 2019-04-30 RX ORDER — TACROLIMUS 1 MG/1
6 CAPSULE ORAL 2 TIMES DAILY
Status: DISCONTINUED | OUTPATIENT
Start: 2019-04-30 | End: 2019-05-01

## 2019-04-30 RX ORDER — ERGOCALCIFEROL 1.25 MG/1
50000 CAPSULE ORAL
Qty: 4 CAPSULE | Refills: 2 | Status: SHIPPED | OUTPATIENT
Start: 2019-05-07 | End: 2019-05-23 | Stop reason: SDUPTHER

## 2019-04-30 RX ORDER — PREDNISONE 10 MG/1
TABLET ORAL
Qty: 40 TABLET | Refills: 0 | Status: SHIPPED | OUTPATIENT
Start: 2019-04-30 | End: 2019-06-03 | Stop reason: ALTCHOICE

## 2019-04-30 RX ORDER — MAGNESIUM SULFATE HEPTAHYDRATE 40 MG/ML
2 INJECTION, SOLUTION INTRAVENOUS ONCE
Status: COMPLETED | OUTPATIENT
Start: 2019-04-30 | End: 2019-04-30

## 2019-04-30 RX ORDER — FAMOTIDINE 20 MG/1
20 TABLET, FILM COATED ORAL NIGHTLY
Qty: 30 TABLET | Refills: 0 | Status: SHIPPED | OUTPATIENT
Start: 2019-04-30 | End: 2019-05-14 | Stop reason: SDUPTHER

## 2019-04-30 RX ORDER — SULFAMETHOXAZOLE AND TRIMETHOPRIM 400; 80 MG/1; MG/1
1 TABLET ORAL DAILY
Qty: 30 TABLET | Refills: 5 | Status: SHIPPED | OUTPATIENT
Start: 2019-04-27 | End: 2019-05-17 | Stop reason: SDUPTHER

## 2019-04-30 RX ORDER — ACYCLOVIR 400 MG/1
400 TABLET ORAL 2 TIMES DAILY
Qty: 60 TABLET | Refills: 2 | Status: SHIPPED | OUTPATIENT
Start: 2019-04-30 | End: 2019-05-29 | Stop reason: SDUPTHER

## 2019-04-30 RX ORDER — TACROLIMUS 1 MG/1
1 CAPSULE ORAL ONCE
Status: COMPLETED | OUTPATIENT
Start: 2019-04-30 | End: 2019-04-30

## 2019-04-30 RX ORDER — HYDROMORPHONE HYDROCHLORIDE 2 MG/ML
INJECTION, SOLUTION INTRAMUSCULAR; INTRAVENOUS; SUBCUTANEOUS
Status: COMPLETED
Start: 2019-04-30 | End: 2019-04-30

## 2019-04-30 RX ORDER — HYDROMORPHONE HYDROCHLORIDE 1 MG/ML
1 INJECTION, SOLUTION INTRAMUSCULAR; INTRAVENOUS; SUBCUTANEOUS ONCE
Status: DISCONTINUED | OUTPATIENT
Start: 2019-04-30 | End: 2019-05-02

## 2019-04-30 RX ORDER — LIDOCAINE HYDROCHLORIDE 10 MG/ML
10 INJECTION INFILTRATION; PERINEURAL ONCE
Status: COMPLETED | OUTPATIENT
Start: 2019-04-30 | End: 2019-04-30

## 2019-04-30 RX ORDER — ACYCLOVIR 400 MG/1
400 TABLET ORAL 2 TIMES DAILY
Qty: 60 TABLET | Refills: 2 | Status: SHIPPED | OUTPATIENT
Start: 2019-04-30 | End: 2019-04-30 | Stop reason: SDUPTHER

## 2019-04-30 RX ADMIN — SODIUM CHLORIDE 3 UNITS/HR: 9 INJECTION, SOLUTION INTRAVENOUS at 01:04

## 2019-04-30 RX ADMIN — FUROSEMIDE 40 MG: 40 TABLET ORAL at 08:04

## 2019-04-30 RX ADMIN — NYSTATIN 500000 UNITS: 500000 SUSPENSION ORAL at 05:04

## 2019-04-30 RX ADMIN — SIMETHICONE CHEW TAB 80 MG 80 MG: 80 TABLET ORAL at 12:04

## 2019-04-30 RX ADMIN — SULFAMETHOXAZOLE AND TRIMETHOPRIM 1 TABLET: 400; 80 TABLET ORAL at 08:04

## 2019-04-30 RX ADMIN — TACROLIMUS 6 MG: 5 CAPSULE ORAL at 05:04

## 2019-04-30 RX ADMIN — TACROLIMUS 1 MG: 1 CAPSULE ORAL at 02:04

## 2019-04-30 RX ADMIN — HEPARIN SODIUM 5000 UNITS: 5000 INJECTION, SOLUTION INTRAVENOUS; SUBCUTANEOUS at 09:04

## 2019-04-30 RX ADMIN — MYCOPHENOLATE MOFETIL 1000 MG: 250 CAPSULE ORAL at 08:04

## 2019-04-30 RX ADMIN — FAMOTIDINE 20 MG: 20 TABLET, FILM COATED ORAL at 09:04

## 2019-04-30 RX ADMIN — INSULIN ASPART 1 UNITS: 100 INJECTION, SOLUTION INTRAVENOUS; SUBCUTANEOUS at 05:04

## 2019-04-30 RX ADMIN — HEPARIN SODIUM 5000 UNITS: 5000 INJECTION, SOLUTION INTRAVENOUS; SUBCUTANEOUS at 01:04

## 2019-04-30 RX ADMIN — TACROLIMUS 5 MG: 5 CAPSULE ORAL at 08:04

## 2019-04-30 RX ADMIN — MYCOPHENOLATE MOFETIL 1000 MG: 250 CAPSULE ORAL at 09:04

## 2019-04-30 RX ADMIN — INSULIN ASPART 6 UNITS: 100 INJECTION, SOLUTION INTRAVENOUS; SUBCUTANEOUS at 05:04

## 2019-04-30 RX ADMIN — INSULIN ASPART 6 UNITS: 100 INJECTION, SOLUTION INTRAVENOUS; SUBCUTANEOUS at 08:04

## 2019-04-30 RX ADMIN — OXYCODONE HYDROCHLORIDE 10 MG: 10 TABLET ORAL at 11:04

## 2019-04-30 RX ADMIN — ERGOCALCIFEROL 50000 UNITS: 1.25 CAPSULE ORAL at 08:04

## 2019-04-30 RX ADMIN — INSULIN ASPART 6 UNITS: 100 INJECTION, SOLUTION INTRAVENOUS; SUBCUTANEOUS at 12:04

## 2019-04-30 RX ADMIN — SIMETHICONE CHEW TAB 80 MG 80 MG: 80 TABLET ORAL at 11:04

## 2019-04-30 RX ADMIN — NYSTATIN 500000 UNITS: 500000 SUSPENSION ORAL at 01:04

## 2019-04-30 RX ADMIN — METHYLPREDNISOLONE SODIUM SUCCINATE 40 MG: 40 INJECTION, POWDER, FOR SOLUTION INTRAMUSCULAR; INTRAVENOUS at 09:04

## 2019-04-30 RX ADMIN — HYDROMORPHONE HYDROCHLORIDE 1 MG: 2 INJECTION INTRAMUSCULAR; INTRAVENOUS; SUBCUTANEOUS at 02:04

## 2019-04-30 RX ADMIN — MAGNESIUM SULFATE IN WATER 2 G: 40 INJECTION, SOLUTION INTRAVENOUS at 04:04

## 2019-04-30 RX ADMIN — METHYLPREDNISOLONE SODIUM SUCCINATE 40 MG: 40 INJECTION, POWDER, FOR SOLUTION INTRAMUSCULAR; INTRAVENOUS at 08:04

## 2019-04-30 RX ADMIN — LIDOCAINE HYDROCHLORIDE 10 ML: 10 INJECTION, SOLUTION INFILTRATION; PERINEURAL at 01:04

## 2019-04-30 RX ADMIN — SIMETHICONE CHEW TAB 80 MG 80 MG: 80 TABLET ORAL at 06:04

## 2019-04-30 RX ADMIN — NYSTATIN 500000 UNITS: 500000 SUSPENSION ORAL at 08:04

## 2019-04-30 RX ADMIN — HEPARIN SODIUM 5000 UNITS: 5000 INJECTION, SOLUTION INTRAVENOUS; SUBCUTANEOUS at 05:04

## 2019-04-30 NOTE — PROGRESS NOTES
Pt arrives to floor via hospital bed.  Family and  at bedside.  Pt oriented to room and call bell. Will continue to monitor pt.

## 2019-04-30 NOTE — ASSESSMENT & PLAN NOTE
- LFTs improving  - Tolerating diet  - Passing flatus, +BM  - Medial JANICE drain with significant resistance during attempted removal today.   -Per surgeon, plan to take back to OR tomorrow for drain removal.   - NPO after midnight.

## 2019-04-30 NOTE — PROGRESS NOTES
Met with patient and his wife in the TSU.  Gave them Liver Transplant Discharge book in Palestinian.  Planning to teach them tomorrow with .  Hospital resident was in the room and spoke fluent Palestinian.  Resident translated the plan to the patient and his wife.

## 2019-04-30 NOTE — PROGRESS NOTES
2nd Note:  GENI met with patient, patient's wife and  Earline from International Dept.    Pt presents in bedside chair, A&Ox4, engaging and communicative.    Pt and wife verify after speaking with Ubaldo Figueroa and Mima Klein with [facility], they have a good understanding of pts prescription coverage post transplant and what pharmacies will be used for specialty and non-specialty prescriptions.    Pt and pts wife will need local transplant lodging at [facility] [place].   Pt will not have a fee at [facility] per Ofelia in International Dept.   Pt was placed on waiting list for an apt at [facility] today.   SW provided pt and pts wife with [place] information sheet today.   Pt denies any coping issues.   Pt in good spirits.  Pt and pts wife with no other psychosocial needs identified today.  SW remains available for all transplant resources, education, psychosocial support and assist with all dc planning needs.

## 2019-04-30 NOTE — HPI
Mr. Bailey is a 52 y/o M with ESLD 2/2 ETOH and HCC s/p tace x 2. He was admitted for liver transplant.

## 2019-04-30 NOTE — PROGRESS NOTES
"[facility]  Endocrinology  Progress Note    Admit Date: 2019     Reason for Consult: Management of T2DM, Hyperglycemia     Surgical Procedure and Date: Liver Transplant 19    Diabetes diagnosis year: > 5 years ago  Lab Results   Component Value Date    HGBA1C 6.9 (H) 2019     Home Diabetes Medications:  Humulin 20 units TID with meals, Lantus 20-25 units q HS    How often checking glucose at home? in the AM/ when he feels bad   BG readings on regimen: 80s-100  Hypoglycemia on the regimen?  No  Missed doses on regimen?  No    Diabetes Complications include:     Hyperglycemia    Complicating diabetes co morbidities:   CIRRHOSIS      HPI:   Patient is a 53 y.o. male with a diagnosis of T2DM, etoh cirrhosis, portal HTN, esophageal varices, and ESLD who presented for liver transplant. Father with DM per chart review.  Endocrinology consulted for management of T2DM/hyperglycemia.                 Interval HPI:   Overnight events: Transferred from ICU to [facility]. [facility]. BG well controlled on transition IV insulin infusion at 3 units/hr and scheduled insulin with meals. Receiving Solu Medrol 40 mg BID.  Eatin%  Nausea: No  Hypoglycemia and intervention: No  Fever: No  TPN and/or TF: No    /78   Pulse 63   Temp 98 °F (36.7 °C)   Resp 12   Ht 5' 6" (1.676 m)   Wt 90 kg (198 lb 6.6 oz)   SpO2 96%   BMI 32.02 kg/m²      Labs Reviewed and Include    Recent Labs   Lab 19  0530   *   CALCIUM 8.2*   ALBUMIN 2.6*   PROT 5.1*   *   K 4.0   CO2 23      BUN 24*   CREATININE 0.9   ALKPHOS 93   *   *   BILITOT 1.6*     Lab Results   Component Value Date    WBC 3.99 2019    HGB 7.6 (L) 2019    HCT 21.3 (L) 2019    MCV 94 2019    PLT 49 (L) 2019     No results for input(s): TSH, FREET4 in the last 168 hours.  Lab Results   Component Value Date    HGBA1C 6.9 (H) 2019       Nutritional status:   Body mass index is " 32.02 kg/m².  Lab Results   Component Value Date    ALBUMIN 2.6 (L) 04/30/2019    ALBUMIN 2.4 (L) 04/29/2019    ALBUMIN 2.9 (L) 04/28/2019     No results found for: PREALBUMIN    Estimated Creatinine Clearance: 98.6 mL/min (based on SCr of 0.9 mg/dL).    Accu-Checks  Recent Labs     04/29/19  0407 04/29/19  0515 04/29/19  0617 04/29/19  0715 04/29/19  1104 04/29/19  1351 04/29/19  1817 04/29/19  2116 04/30/19  0204 04/30/19  0731   POCTGLUCOSE 271* 226* 164* 155* 136* 147* 190* 141* 108 137*       Current Medications and/or Treatments Impacting Glycemic Control  Immunotherapy:    Immunosuppressants         Stop Route Frequency     mycophenolate capsule 1,000 mg      -- Oral 2 times daily     tacrolimus capsule 5 mg      -- Oral 2 times daily        Steroids:   Hormones (From admission, onward)    Start     Stop Route Frequency Ordered    05/02/19 0900  predniSONE tablet 20 mg  (methylprednisolone taper panel)      -- Oral Daily 04/27/19 0001    05/01/19 0900  methylPREDNISolone sodium succinate injection 20 mg  (methylprednisolone taper panel)      05/02 0859 IV Every 12 hours 04/27/19 0001    04/30/19 0900  methylPREDNISolone sodium succinate injection 40 mg  (methylprednisolone taper panel)      05/01 0859 IV Every 12 hours 04/27/19 0001        Pressors:    Autonomic Drugs (From admission, onward)    None        Hyperglycemia/Diabetes Medications:   Antihyperglycemics (From admission, onward)    Start     Stop Route Frequency Ordered    04/29/19 1130  insulin aspart U-100 pen 4-6 Units      -- SubQ 3 times daily with meals 04/29/19 0757    04/29/19 0900  insulin regular (Humulin R) 100 Units in sodium chloride 0.9% 100 mL infusion      -- IV Continuous 04/29/19 0757    04/29/19 0857  insulin aspart U-100 pen 0-5 Units      -- SubQ As needed (PRN) 04/29/19 0757          ASSESSMENT and PLAN    * Liver transplanted  Post op day 5  Managed per primary team  Avoid hypoglycemia        Type 2 diabetes mellitus with  hyperglycemia  BG goal 140 - 180     Plan:  Transition IV insulin infusion at 3 units/hr - (decreased overnight per protocol)   Novolog 4-6 units TID with meals (weight based using 0.5 modifier)  Low dose correction scale  BG monitoring AC/HS/0200    ** Please call Endocrine for any BG related issues   ** Please notify Endocrine for any change and/or advance in diet**    Discharge planning: TBD        Prophylactic immunotherapy  May increase insulin requirements      Adverse effect of corticosteroids  Glucocorticoids markedly increase glucoses.   May increase insulin requirements.        Class 1 obesity due to excess calories in adult  May increase insulin resistance.             Anat Leyva, PREM  Endocrinology  [facility]

## 2019-04-30 NOTE — SUBJECTIVE & OBJECTIVE
Scheduled Meds:   [START ON 5/6/2019] acyclovir  400 mg Oral BID    docusate sodium  100 mg Oral TID    ergocalciferol  50,000 Units Oral Q7 Days    famotidine  20 mg Oral QHS    furosemide  40 mg Oral Daily    heparin (porcine)  5,000 Units Subcutaneous Q8H    HYDROmorphone  1 mg Intravenous Once    insulin aspart U-100  4-6 Units Subcutaneous TIDWM    methylPREDNISolone sodium succinate  40 mg Intravenous Q12H    Followed by    [START ON 5/1/2019] methylPREDNISolone sodium succinate  20 mg Intravenous Q12H    Followed by    [START ON 5/2/2019] predniSONE  20 mg Oral Daily    mycophenolate  1,000 mg Oral BID    nystatin  500,000 Units Mouth/Throat TID PC    sulfamethoxazole-trimethoprim 400-80mg  1 tablet Oral Daily    tacrolimus  6 mg Oral BID     Continuous Infusions:   insulin (HUMAN R) infusion (adults) 3 Units/hr (04/30/19 1345)     PRN Meds:sodium chloride, dextrose 50%, dextrose 50%, glucagon (human recombinant), glucose, glucose, hydrALAZINE, insulin aspart U-100, oxyCODONE, oxyCODONE, simethicone    Review of Systems   Constitutional: Positive for activity change and appetite change. Negative for chills and fever.   Respiratory: Negative for shortness of breath and wheezing.    Cardiovascular: Positive for leg swelling.   Gastrointestinal: Positive for abdominal distention and abdominal pain. Negative for nausea and vomiting.   Genitourinary: Negative for decreased urine volume, difficulty urinating, dysuria and hematuria.   Skin: Positive for wound.   Allergic/Immunologic: Positive for immunocompromised state.   Neurological: Negative for dizziness and weakness.   Psychiatric/Behavioral: Negative for agitation and decreased concentration.     Objective:     Vital Signs (Most Recent):  Temp: 98.9 °F (37.2 °C) (04/30/19 1126)  Pulse: 69 (04/30/19 1144)  Resp: 15 (04/30/19 1144)  BP: (!) 164/91 (04/30/19 1144)  SpO2: 98 % (04/30/19 1144) Vital Signs (24h Range):  Temp:  [98 °F (36.7 °C)-98.9  °F (37.2 °C)] 98.9 °F (37.2 °C)  Pulse:  [63-77] 69  Resp:  [12-32] 15  SpO2:  [94 %-100 %] 98 %  BP: (128-172)/(72-91) 164/91     Weight: 90 kg (198 lb 6.6 oz)  Body mass index is 32.02 kg/m².    Intake/Output - Last 3 Shifts       04/28 0700 - 04/29 0659 04/29 0700 - 04/30 0659 04/30 0700 - 05/01 0659    P.O. 240 400     I.V. (mL/kg) 1906.6 (21.2) 544.2 (6)     Blood  166.7     NG/GT       IV Piggyback       Total Intake(mL/kg) 2146.6 (23.9) 1110.8 (12.3)     Urine (mL/kg/hr) 2100 (1) 580 (0.3)     Emesis/NG output  0     Drains 336 205 90    Other  0     Stool  0 0    Blood  0     Total Output 2436 785 90    Net -289.4 +325.8 -90           Urine Occurrence  3 x     Stool Occurrence  0 x 1 x    Emesis Occurrence  0 x           Physical Exam   Constitutional: He is oriented to person, place, and time. No distress.   Cardiovascular: Normal rate and regular rhythm.   No murmur heard.  Pulmonary/Chest: Effort normal and breath sounds normal. He has no wheezes. He has no rales.   Abdominal: Soft. He exhibits distension. There is no tenderness. There is no rebound and no guarding.       Neurological: He is alert and oriented to person, place, and time.   Skin: He is not diaphoretic.   Psychiatric: He has a normal mood and affect. His behavior is normal. Judgment and thought content normal.   Nursing note and vitals reviewed.      Laboratory:  Immunosuppressants         Stop Route Frequency     tacrolimus capsule 6 mg      -- Oral 2 times daily     mycophenolate capsule 1,000 mg      -- Oral 2 times daily        CBC:   Recent Labs   Lab 04/30/19  0530   WBC 3.99   RBC 2.27*   HGB 7.6*   HCT 21.3*   PLT 49*   MCV 94   MCH 33.5*   MCHC 35.7     CMP:   Recent Labs   Lab 04/30/19  0530   *   CALCIUM 8.2*   ALBUMIN 2.6*   PROT 5.1*   *   K 4.0   CO2 23      BUN 24*   CREATININE 0.9   ALKPHOS 93   *   *   BILITOT 1.6*     Labs within the past 24 hours have been reviewed.    Diagnostic  Results:  None

## 2019-04-30 NOTE — HOSPITAL COURSE
He is now s/p DBD  liver transplant on 4/26/2019. This patient will follow the Steroid Induction protocol.  This patients immunosuppression will include a steroid taper over 6 weeks, Cellcept for 3 months and Prograf maintenance.  Opportunistic infection prophylaxis will include acyclovir for 3 months (CMV D- R-), Bactrim for 6 months, and nystatin. Surgery was without complication. He was transferred to TSU on POD#3.     Interval History: No acute events overnight. Patient with nausea and abdominal distention today. Reporting minimal flatus and only small BM with suppository yesterday. Plan for KUB and enema. Continue to monitor.

## 2019-04-30 NOTE — PLAN OF CARE
Problem: Adult Inpatient Plan of Care  Goal: Plan of Care Review  Outcome: Ongoing (interventions implemented as appropriate)  -Pt AAOx4  -Vital signs stable  -BG monitoring ACHS and 0200; BG HS was 141, no SSI given  -Transitional insulin gtt going at 5 units/hr  -Chevron GLADYS with staples; CDI  -R sided JANICE putting out sanguinous output.  See flowsheet for output values  -Self med box put together, ready for teaching tomorrow  -Family at bedside  -Fall precautions maintained, non skid socks worn  -No acute events/falls/injuries this shift  -Pt aware to call for help with ambulating.    -Bed lowered, locked, siderails up x2, and call bell in reach.    See flowsheet for assessment findings.  Will continue to monitor pt.

## 2019-04-30 NOTE — NURSING
"Pt and caregiver taught how to paint patient's incision with betadine. The importance of not "windshield wipering" with swabs was stressed. Pt and caregiver verbalized understanding.  "

## 2019-04-30 NOTE — ANESTHESIA PREPROCEDURE EVALUATION
[facility]  Anesthesia Pre-Operative Evaluation         Patient Name: Cesario Bailey  YOB: 1965  MRN: [id number]    SUBJECTIVE:     Pre-operative evaluation for Procedure(s) (LRB):  LAPAROTOMY, EXPLORATORY (Bilateral)     04/30/2019    Cesario Bailey is a 53 y.o. male w/ a significant PMHx of ESLD (secondary to alcoholic cirrhosis) with portal HTN, esophageal varices, and HCC (s/p Tace 6/11/2018 and 2/4/2019) who is s/p transplant 4/26/2019. Patient has been doing well post-op with plans for imminent discharge. Plans to remove JANICE drain tomorrow, however was sutured intra-op and will have to be surgically removed.     Patient now presents for the above procedure(s).    LDA:   RIGHT IJ HOG 12Fr         Closed/Suction Drain Right Abdomen Bulb 19 Fr. (Active)   Site Description Unable to view 4/29/2019  8:00 PM   Dressing Type Gauze 4/30/2019  8:20 AM   Dressing Status Clean;Dry;Intact 4/30/2019  8:20 AM   Dressing Intervention Dressing reinforced 4/29/2019  8:00 PM   Drainage Serosanguineous 4/30/2019  8:20 AM   Status To bulb suction 4/30/2019  8:20 AM   Output (mL) 90 mL 4/30/2019 10:20 AM   Number of days:        Prev airway: Easy mask ventilation. Grade I view on DL, ETT placement uncomplicated.     Drips:    insulin (HUMAN R) infusion (adults) 3 Units/hr (04/30/19 1345)       Patient Active Problem List   Diagnosis    HCC (hepatocellular carcinoma)    Thrombocytopenia due to hypersplenism    Lung nodule seen on imaging study    Liver replaced by transplant    Long-term use of immunosuppressant medication    Prophylactic immunotherapy    Type 2 diabetes mellitus with hyperglycemia    Adverse effect of corticosteroids    Class 1 obesity due to excess calories in adult    Liver failure    Acute blood loss anemia       Review of patient's allergies indicates:   Allergen Reactions    Aspirin        Current Inpatient Medications:   [START ON 5/6/2019] acyclovir  400 mg  Oral BID    docusate sodium  100 mg Oral TID    ergocalciferol  50,000 Units Oral Q7 Days    famotidine  20 mg Oral QHS    furosemide  40 mg Oral Daily    heparin (porcine)  5,000 Units Subcutaneous Q8H    insulin aspart U-100  4-6 Units Subcutaneous TIDWM    methylPREDNISolone sodium succinate  40 mg Intravenous Q12H    Followed by    [START ON 5/1/2019] methylPREDNISolone sodium succinate  20 mg Intravenous Q12H    Followed by    [START ON 5/2/2019] predniSONE  20 mg Oral Daily    mycophenolate  1,000 mg Oral BID    nystatin  500,000 Units Mouth/Throat TID PC    sulfamethoxazole-trimethoprim 400-80mg  1 tablet Oral Daily    tacrolimus  6 mg Oral BID       Current Facility-Administered Medications on File Prior to Encounter   Medication Dose Route Frequency Provider Last Rate Last Dose    DOXOrubicin with LC beads chemoembolization  76 mg Intra-arterial Once Crystal Gomez DNP        And    DOXOrubicin with LC beads chemoembolization  76 mg Intra-arterial Once Crystal Gomez DNP         Current Outpatient Medications on File Prior to Encounter   Medication Sig Dispense Refill    furosemide (LASIX) 40 MG tablet Take 40 mg by mouth once daily.      insulin glargine (LANTUS) 100 unit/mL injection Inject 5 Units into the skin every evening.      insulin regular 100 unit/mL Inj injection Inject 20 Units into the skin 3 (three) times daily before meals.         Past Surgical History:   Procedure Laterality Date    COLONOSCOPY N/A 7/23/2018    Performed by Romain Gongora MD at [facility] (4TH FLR)    EMBOLIZATION      ESOPHAGOGASTRODUODENOSCOPY (EGD) N/A 7/23/2018    Performed by Romain Gongora MD at [facility] (4TH FLR)    HERNIA REPAIR      umbilical hernia repair, large mesh placed.     TRANSPLANT, LIVER N/A 4/26/2019    Performed by Thien Villanueva MD at [facility] OR 2ND FLR       Social History     Socioeconomic History    Marital status:      Spouse name: Not on file    Number  of children: Not on file    Years of education: Not on file    Highest education level: Not on file   Occupational History    Not on file   Social Needs    Financial resource strain: Not on file    Food insecurity:     Worry: Not on file     Inability: Not on file    Transportation needs:     Medical: Not on file     Non-medical: Not on file   Tobacco Use    Smoking status: Never Smoker    Smokeless tobacco: Never Used   Substance and Sexual Activity    Alcohol use: Never     Frequency: Never    Drug use: Never    Sexual activity: Not on file   Lifestyle    Physical activity:     Days per week: Not on file     Minutes per session: Not on file    Stress: Not on file   Relationships    Social connections:     Talks on phone: Not on file     Gets together: Not on file     Attends Sabianism service: Not on file     Active member of club or organization: Not on file     Attends meetings of clubs or organizations: Not on file     Relationship status: Not on file   Other Topics Concern    Not on file   Social History Narrative    Not on file       OBJECTIVE:     Vital Signs Range (Last 24H):  Temp:  [36.7 °C (98 °F)-37.2 °C (98.9 °F)]   Pulse:  [63-77]   Resp:  [12-32]   BP: (128-172)/(72-91)   SpO2:  [94 %-100 %]       Significant Labs:  Lab Results   Component Value Date    WBC 3.99 04/30/2019    HGB 7.6 (L) 04/30/2019    HCT 21.3 (L) 04/30/2019    PLT 49 (L) 04/30/2019     (H) 04/30/2019     (H) 04/30/2019     (L) 04/30/2019    K 4.0 04/30/2019     04/30/2019    CREATININE 0.9 04/30/2019    BUN 24 (H) 04/30/2019    CO2 23 04/30/2019    TSH 1.066 07/16/2018    PSA 0.08 07/16/2018    INR 1.1 04/30/2019    HGBA1C 6.9 (H) 04/26/2019       Diagnostic Studies: No relevant studies    EKG: Normal sinus rhythm    NM MULTI STUDY RX STRESS CARD COMPONENT  EKG Conclusions:  1. The EKG portion of this study is negative for ischemia at a peak heart rate of 64 bpm (38% of predicted).   2.  Sensitivity is impaired due to failure to reach target heart rate.   3. Blood pressure remained stable throughout the protocol  (Presenting BP: 138/82 Peak BP: 131/79).   4. No significant arrhythmias were present.   5. There were no symptoms of chest discomfort or significant dyspnea throughout the protocol.   6. Nuclear portion of this study will be reported seperately.    DST  CONCLUSIONS     1 - Normal left ventricular systolic function (EF 60-65%).     2 - Normal left ventricular diastolic function.     3 - Normal right ventricular systolic function .     4 - The estimated PA systolic pressure is greater than 20 mmHg.     5 - Moderate left atrial enlargement.     6 - No wall motion abnormalities.     7 - Mild left atrial enlargement.     No evidence of stress induced myocardial ischemia.     ASSESSMENT/PLAN:       Anesthesia Evaluation    I have reviewed the Patient Summary Reports.    I have reviewed the Nursing Notes.   I have reviewed the Medications.     Review of Systems  Anesthesia Hx:  History of prior surgery of interest to airway management or planning: Denies Family Hx of Anesthesia complications.   Denies Personal Hx of Anesthesia complications.   Hematology/Oncology:  Hematology Normal   Oncology Normal     EENT/Dental:EENT/Dental Normal   Cardiovascular:  Cardiovascular Normal     Pulmonary:  Pulmonary Normal    Renal/:  Renal/ Normal     Hepatic/GI:   Liver Disease,  Liver Disease, Alcoholic Liver Disease , Cirrhosis Portal Hypertension, Hepatocellular Carcinoma    Musculoskeletal:  Musculoskeletal Normal    Neurological:  Neurology Normal    Endocrine:  Endocrine Normal    Dermatological:  Skin Normal    Psych:  Psychiatric Normal              Anesthesia Plan  Type of Anesthesia, risks & benefits discussed:  Anesthesia Type:  general  Patient's Preference:   Intra-op Monitoring Plan: standard ASA monitors  Intra-op Monitoring Plan Comments:   Post Op Pain Control Plan: per primary service  following discharge from PACU, IV/PO Opioids PRN and multimodal analgesia  Post Op Pain Control Plan Comments:   Induction:   IV  Beta Blocker:  Patient is not currently on a Beta-Blocker (No further documentation required).     Beta Blocker Comments: Patient hemodynamically stable  Only on Insulin gtt  Right IJ Central Access  No Peripheral Access  Informed Consent: Patient understands risks and agrees with Anesthesia plan.  Questions answered. Anesthesia consent signed with patient.  ASA Score: 3     Day of Surgery Review of History & Physical:            Ready For Surgery From Anesthesia Perspective.

## 2019-04-30 NOTE — NURSING
RN taught patient and caregiver at bedside via  self meds. Pt and caregiver verbalized understanding and that they had no questions at this time. Pt stated via  that the dulcolax made his stomach hurt. Pt requests this medication not be given to him.

## 2019-04-30 NOTE — PHYSICIAN QUERY
"PT Name: Cesario Bailey  MR #: [id number]    Physician Query Form - Hematology Clarification      CDS/: Annamaria Bahena               Contact information: [email or web address]    This form is a permanent document in the medical record.      Query Date: April 30, 2019    By submitting this query, we are merely seeking further clarification of documentation. Please utilize your independent clinical judgment when addressing the question(s) below.    The Medical record contains the following:   Indicators  Supporting Clinical Findings Location in Medical Record   X "Anemia" documented Anemia    Liver Transplant PN 4/29   X H & H = Hemoglobin 14.7- 7.6  Hematocrit 41.1-21.3 Lab 4/26- 4/30    BP =                     HR=      "GI bleeding" documented     X Acute bleeding (Non GI site) EBL 1800ml   Op Note 4/26   X Transfusion(s) 1unit PRBC  Transfusion Record     X Treatment: Cell saver 600cc Op Note 4/26    Other:        Provider, please specify diagnosis or diagnoses associated with above clinical findings.    [X] Acute blood loss anemia expected post-operatively   [  ] Acute blood loss anemia     [  ] Other Hematological Diagnosis (please specify):     [  ] Clinically Undetermined       Please document in your progress notes daily for the duration of treatment, until resolved, and include in your discharge summary.                                                                                                      "

## 2019-04-30 NOTE — SUBJECTIVE & OBJECTIVE
"Interval HPI:   Overnight events[facility] BG well controlled on transition IV insulin infusion at 3 units/hr and scheduled insulin with meals. Receiving Solu Medrol 40 mg BID.  Eatin%  Nausea: No  Hypoglycemia and intervention: No  Fever: No  TPN and/or TF: No    /78   Pulse 63   Temp 98 °F (36.7 °C)   Resp 12   Ht 5' 6" (1.676 m)   Wt 90 kg (198 lb 6.6 oz)   SpO2 96%   BMI 32.02 kg/m²     Labs Reviewed and Include    Recent Labs   Lab 19  0530   *   CALCIUM 8.2*   ALBUMIN 2.6*   PROT 5.1*   *   K 4.0   CO2 23      BUN 24*   CREATININE 0.9   ALKPHOS 93   *   *   BILITOT 1.6*     Lab Results   Component Value Date    WBC 3.99 2019    HGB 7.6 (L) 2019    HCT 21.3 (L) 2019    MCV 94 2019    PLT 49 (L) 2019     No results for input(s): TSH, FREET4 in the last 168 hours.  Lab Results   Component Value Date    HGBA1C 6.9 (H) 2019       Nutritional status:   Body mass index is 32.02 kg/m².  Lab Results   Component Value Date    ALBUMIN 2.6 (L) 2019    ALBUMIN 2.4 (L) 2019    ALBUMIN 2.9 (L) 2019     No results found for: PREALBUMIN    Estimated Creatinine Clearance: 98.6 mL/min (based on SCr of 0.9 mg/dL).    Accu-Checks  Recent Labs     19  0407 19  0515 19  0617 19  0715 19  1104 19  1351 19  1817 19  2116 19  0204 19  0731   POCTGLUCOSE 271* 226* 164* 155* 136* 147* 190* 141* 108 137*       Current Medications and/or Treatments Impacting Glycemic Control  Immunotherapy:    Immunosuppressants         Stop Route Frequency     mycophenolate capsule 1,000 mg      -- Oral 2 times daily     tacrolimus capsule 5 mg      -- Oral 2 times daily        Steroids:   Hormones (From admission, onward)    Start     Stop Route Frequency Ordered    19 0900  predniSONE tablet 20 mg  (methylprednisolone taper panel)      -- Oral Daily " 04/27/19 0001    05/01/19 0900  methylPREDNISolone sodium succinate injection 20 mg  (methylprednisolone taper panel)      05/02 0859 IV Every 12 hours 04/27/19 0001    04/30/19 0900  methylPREDNISolone sodium succinate injection 40 mg  (methylprednisolone taper panel)      05/01 0859 IV Every 12 hours 04/27/19 0001        Pressors:    Autonomic Drugs (From admission, onward)    None        Hyperglycemia/Diabetes Medications:   Antihyperglycemics (From admission, onward)    Start     Stop Route Frequency Ordered    04/29/19 1130  insulin aspart U-100 pen 4-6 Units      -- SubQ 3 times daily with meals 04/29/19 0757    04/29/19 0900  insulin regular (Humulin R) 100 Units in sodium chloride 0.9% 100 mL infusion      -- IV Continuous 04/29/19 0757    04/29/19 0857  insulin aspart U-100 pen 0-5 Units      -- SubQ As needed (PRN) 04/29/19 0757

## 2019-04-30 NOTE — ASSESSMENT & PLAN NOTE
- Continue prograf, cellcept, steroids. Continue to monitor prograf level daily, monitor for toxic side effects, and adjust for therapeutic dose.

## 2019-04-30 NOTE — ASSESSMENT & PLAN NOTE
BG goal 140 - 180     Plan:  Transition IV insulin infusion at 3 units/hr - (decreased overnight per protocol)   Novolog 4-6 units TID with meals (weight based using 0.5 modifier)  Low dose correction scale  BG monitoring AC/HS/0200    ** Please call Endocrine for any BG related issues   ** Please notify Endocrine for any change and/or advance in diet**    Discharge planning: TBD

## 2019-04-30 NOTE — PROGRESS NOTES
[facility]  Liver Transplant  Progress Note    Patient Name: Cesario Bailey  MRN: [id number]  Admission Date: 2019  Hospital Length of Stay: 4 days  Code Status: Full Code  Primary Care Provider: Primary Doctor No  Post-Operative Day: 4    ORGAN:   LIVER  Disease Etiology: Primary Liver Malignancy: Hepatoma (HCC) and Cirrhosis  Donor Type:    - Brain Death  CDC High Risk:   No  Donor CMV Status:   Donor CMV Status: Negative  Donor HBcAB:   Negative  Donor HCV Status:   Negative  Donor HBV MARIA R: Negative  Donor HCV MARIA R: Negative  Whole or Partial: Whole Liver  Biliary Anastomosis: End to End  Arterial Anatomy: Standard  Subjective:     History of Present Illness:  Mr. Bailey is a 52 y/o M with ESLD 2/2 ETOH and HCC s/p tace x 2. He was admitted for liver transplant.     Hospital Course:  He is now s/p DBD  liver transplant on 2019. This patient will follow the Steroid Induction protocol.  This patients immunosuppression will include a steroid taper over 6 weeks, Cellcept for 3 months and Prograf maintenance.  Opportunistic infection prophylaxis will include acyclovir for 3 months (CMV D- R-), Bactrim for 6 months, and nystatin. Surgery was without complication. He was transferred to TSU on POD#3.     Interval History: No acute events overnight. Patient transferred to TSU from ICU yesterday. Today he reports feeling well without complaint, tolerating diet, passing flatus, +BM. LFTs improving. H/H remains on lower end but VSS. Hold on transfusing PRBC at this time per surgeon.     Of significance, attempted to remove medial JANICE drain today at bedside. Drain noted with significant resistance during attempt to remove..Transplant surgeon, Dr. Hoff, notified and presented to bedside. Dr. Hoff attempted to remove drain as well and was unsuccessful due to continued resistance. Therefore, decision made to make patient NPO at midnight and take back to OR tomorrow for drain  removal. Plan discussed with patient and caregiver via . Patient verbalized his understanding.     Scheduled Meds:   [START ON 5/6/2019] acyclovir  400 mg Oral BID    docusate sodium  100 mg Oral TID    ergocalciferol  50,000 Units Oral Q7 Days    famotidine  20 mg Oral QHS    furosemide  40 mg Oral Daily    heparin (porcine)  5,000 Units Subcutaneous Q8H    HYDROmorphone  1 mg Intravenous Once    insulin aspart U-100  4-6 Units Subcutaneous TIDWM    methylPREDNISolone sodium succinate  40 mg Intravenous Q12H    Followed by    [START ON 5/1/2019] methylPREDNISolone sodium succinate  20 mg Intravenous Q12H    Followed by    [START ON 5/2/2019] predniSONE  20 mg Oral Daily    mycophenolate  1,000 mg Oral BID    nystatin  500,000 Units Mouth/Throat TID PC    sulfamethoxazole-trimethoprim 400-80mg  1 tablet Oral Daily    tacrolimus  6 mg Oral BID     Continuous Infusions:   insulin (HUMAN R) infusion (adults) 3 Units/hr (04/30/19 1345)     PRN Meds:sodium chloride, dextrose 50%, dextrose 50%, glucagon (human recombinant), glucose, glucose, hydrALAZINE, insulin aspart U-100, oxyCODONE, oxyCODONE, simethicone    Review of Systems   Constitutional: Positive for activity change and appetite change. Negative for chills and fever.   Respiratory: Negative for shortness of breath and wheezing.    Cardiovascular: Positive for leg swelling.   Gastrointestinal: Positive for abdominal distention and abdominal pain. Negative for nausea and vomiting.   Genitourinary: Negative for decreased urine volume, difficulty urinating, dysuria and hematuria.   Skin: Positive for wound.   Allergic/Immunologic: Positive for immunocompromised state.   Neurological: Negative for dizziness and weakness.   Psychiatric/Behavioral: Negative for agitation and decreased concentration.     Objective:     Vital Signs (Most Recent):  Temp: 98.9 °F (37.2 °C) (04/30/19 1126)  Pulse: 69 (04/30/19 1144)  Resp: 15 (04/30/19 1144)  BP:  (!) 164/91 (04/30/19 1144)  SpO2: 98 % (04/30/19 1144) Vital Signs (24h Range):  Temp:  [98 °F (36.7 °C)-98.9 °F (37.2 °C)] 98.9 °F (37.2 °C)  Pulse:  [63-77] 69  Resp:  [12-32] 15  SpO2:  [94 %-100 %] 98 %  BP: (128-172)/(72-91) 164/91     Weight: 90 kg (198 lb 6.6 oz)  Body mass index is 32.02 kg/m².    Intake/Output - Last 3 Shifts       04/28 0700 - 04/29 0659 04/29 0700 - 04/30 0659 04/30 0700 - 05/01 0659    P.O. 240 400     I.V. (mL/kg) 1906.6 (21.2) 544.2 (6)     Blood  166.7     NG/GT       IV Piggyback       Total Intake(mL/kg) 2146.6 (23.9) 1110.8 (12.3)     Urine (mL/kg/hr) 2100 (1) 580 (0.3)     Emesis/NG output  0     Drains 336 205 90    Other  0     Stool  0 0    Blood  0     Total Output 2436 785 90    Net -289.4 +325.8 -90           Urine Occurrence  3 x     Stool Occurrence  0 x 1 x    Emesis Occurrence  0 x           Physical Exam   Constitutional: He is oriented to person, place, and time. No distress.   Cardiovascular: Normal rate and regular rhythm.   No murmur heard.  Pulmonary/Chest: Effort normal and breath sounds normal. He has no wheezes. He has no rales.   Abdominal: Soft. He exhibits distension. There is no tenderness. There is no rebound and no guarding.       Neurological: He is alert and oriented to person, place, and time.   Skin: He is not diaphoretic.   Psychiatric: He has a normal mood and affect. His behavior is normal. Judgment and thought content normal.   Nursing note and vitals reviewed.      Laboratory:  Immunosuppressants         Stop Route Frequency     tacrolimus capsule 6 mg      -- Oral 2 times daily     mycophenolate capsule 1,000 mg      -- Oral 2 times daily        CBC:   Recent Labs   Lab 04/30/19  0530   WBC 3.99   RBC 2.27*   HGB 7.6*   HCT 21.3*   PLT 49*   MCV 94   MCH 33.5*   MCHC 35.7     CMP:   Recent Labs   Lab 04/30/19  0530   *   CALCIUM 8.2*   ALBUMIN 2.6*   PROT 5.1*   *   K 4.0   CO2 23      BUN 24*   CREATININE 0.9   ALKPHOS 93    *   *   BILITOT 1.6*     Labs within the past 24 hours have been reviewed.    Diagnostic Results:  None    Assessment/Plan:     * Liver replaced by transplant  - LFTs improving  - Tolerating diet  - Passing flatus, +BM  - Medial JANICE drain with significant resistance during attempted removal today.   -Per surgeon, plan to take back to OR tomorrow for drain removal.   - NPO after midnight.        Anemia of chronic disease  - Due to hx of ESLD  - See acute blood loss anemia.   - Monitor with daily cbc.       Acidosis  -Improving. Continue to monitor with daily labs.       Hypomagnesemia  - Replace with IV Mg. Monitor with daily labs.       Acute blood loss anemia  Expected post operatively  H/H remains low  Hold off on transfusing at this time  Continue to monitor with daily cbc.       Type 2 diabetes mellitus with hyperglycemia  - Endocrine following for help with management. Appreciate their assistance.       Prophylactic immunotherapy  - See long term use of immunosuppressant medication.       Long-term use of immunosuppressant medication  - Continue prograf, cellcept, steroids. Continue to monitor prograf level daily, monitor for toxic side effects, and adjust for therapeutic dose.       Thrombocytopenia due to hypersplenism  - Likely to continue to improve post liver transplant.           Debility/Functional status: Patient debilitated by evidence of Weakness. Physical and occupational therapy ordered daily to evaluate and treat. Debility was: present on admission.    VTE Risk Mitigation (From admission, onward)        Ordered     heparin (porcine) injection 5,000 Units  Every 8 hours      04/27/19 0001     Place sequential compression device  Until discontinued      04/27/19 0001     IP VTE HIGH RISK PATIENT  Once      04/27/19 0001          The patients clinical status was discussed at multidisplinary rounds, involving transplant surgery, transplant medicine, pharmacy, nursing, nutrition, and  social work     Discharge Planning: Not a candidate for discharge at this time.   No Patient Care Coordination Note on file.      Toma Scott PA-C  Liver Transplant  [facility]

## 2019-04-30 NOTE — ASSESSMENT & PLAN NOTE
Expected post operatively  H/H remains low  Hold off on transfusing at this time  Continue to monitor with daily cbc.

## 2019-05-01 ENCOUNTER — ANESTHESIA (OUTPATIENT)
Dept: SURGERY | Facility: HOSPITAL | Age: 54
DRG: 005 | End: 2019-05-01
Payer: COMMERCIAL

## 2019-05-01 LAB
ABO + RH BLD: NORMAL
ALBUMIN SERPL BCP-MCNC: 2.6 G/DL (ref 3.5–5.2)
ALBUMIN SERPL BCP-MCNC: 2.6 G/DL (ref 3.5–5.2)
ALP SERPL-CCNC: 104 U/L (ref 55–135)
ALP SERPL-CCNC: 104 U/L (ref 55–135)
ALT SERPL W/O P-5'-P-CCNC: 359 U/L (ref 10–44)
ALT SERPL W/O P-5'-P-CCNC: 382 U/L (ref 10–44)
ANION GAP SERPL CALC-SCNC: 11 MMOL/L (ref 8–16)
ANION GAP SERPL CALC-SCNC: 8 MMOL/L (ref 8–16)
APTT BLDCRRT: 22.1 SEC (ref 21–32)
AST SERPL-CCNC: 66 U/L (ref 10–40)
AST SERPL-CCNC: 77 U/L (ref 10–40)
BASOPHILS # BLD AUTO: 0.01 K/UL (ref 0–0.2)
BASOPHILS # BLD AUTO: 0.01 K/UL (ref 0–0.2)
BASOPHILS NFR BLD: 0.2 % (ref 0–1.9)
BASOPHILS NFR BLD: 0.2 % (ref 0–1.9)
BILIRUB SERPL-MCNC: 1.4 MG/DL (ref 0.1–1)
BILIRUB SERPL-MCNC: 1.4 MG/DL (ref 0.1–1)
BLD GP AB SCN CELLS X3 SERPL QL: NORMAL
BUN SERPL-MCNC: 21 MG/DL (ref 6–20)
BUN SERPL-MCNC: 25 MG/DL (ref 6–20)
CALCIUM SERPL-MCNC: 8.1 MG/DL (ref 8.7–10.5)
CALCIUM SERPL-MCNC: 8.3 MG/DL (ref 8.7–10.5)
CHLORIDE SERPL-SCNC: 103 MMOL/L (ref 95–110)
CHLORIDE SERPL-SCNC: 105 MMOL/L (ref 95–110)
CO2 SERPL-SCNC: 20 MMOL/L (ref 23–29)
CO2 SERPL-SCNC: 21 MMOL/L (ref 23–29)
CREAT SERPL-MCNC: 0.8 MG/DL (ref 0.5–1.4)
CREAT SERPL-MCNC: 0.9 MG/DL (ref 0.5–1.4)
DIFFERENTIAL METHOD: ABNORMAL
DIFFERENTIAL METHOD: ABNORMAL
EOSINOPHIL # BLD AUTO: 0.5 K/UL (ref 0–0.5)
EOSINOPHIL # BLD AUTO: 1 K/UL (ref 0–0.5)
EOSINOPHIL NFR BLD: 10.7 % (ref 0–8)
EOSINOPHIL NFR BLD: 16.5 % (ref 0–8)
ERYTHROCYTE [DISTWIDTH] IN BLOOD BY AUTOMATED COUNT: 14.1 % (ref 11.5–14.5)
ERYTHROCYTE [DISTWIDTH] IN BLOOD BY AUTOMATED COUNT: 14.2 % (ref 11.5–14.5)
EST. GFR  (AFRICAN AMERICAN): >60 ML/MIN/1.73 M^2
EST. GFR  (AFRICAN AMERICAN): >60 ML/MIN/1.73 M^2
EST. GFR  (NON AFRICAN AMERICAN): >60 ML/MIN/1.73 M^2
EST. GFR  (NON AFRICAN AMERICAN): >60 ML/MIN/1.73 M^2
GLUCOSE SERPL-MCNC: 181 MG/DL (ref 70–110)
GLUCOSE SERPL-MCNC: 205 MG/DL (ref 70–110)
HCT VFR BLD AUTO: 22.4 % (ref 40–54)
HCT VFR BLD AUTO: 23.5 % (ref 40–54)
HGB BLD-MCNC: 7.9 G/DL (ref 14–18)
HGB BLD-MCNC: 8.4 G/DL (ref 14–18)
IMM GRANULOCYTES # BLD AUTO: 0.03 K/UL (ref 0–0.04)
IMM GRANULOCYTES # BLD AUTO: 0.03 K/UL (ref 0–0.04)
IMM GRANULOCYTES NFR BLD AUTO: 0.5 % (ref 0–0.5)
IMM GRANULOCYTES NFR BLD AUTO: 0.7 % (ref 0–0.5)
INR PPP: 1.1 (ref 0.8–1.2)
LYMPHOCYTES # BLD AUTO: 0.2 K/UL (ref 1–4.8)
LYMPHOCYTES # BLD AUTO: 0.3 K/UL (ref 1–4.8)
LYMPHOCYTES NFR BLD: 4.1 % (ref 18–48)
LYMPHOCYTES NFR BLD: 5.1 % (ref 18–48)
MAGNESIUM SERPL-MCNC: 2 MG/DL (ref 1.6–2.6)
MCH RBC QN AUTO: 33.1 PG (ref 27–31)
MCH RBC QN AUTO: 33.6 PG (ref 27–31)
MCHC RBC AUTO-ENTMCNC: 35.3 G/DL (ref 32–36)
MCHC RBC AUTO-ENTMCNC: 35.7 G/DL (ref 32–36)
MCV RBC AUTO: 94 FL (ref 82–98)
MCV RBC AUTO: 94 FL (ref 82–98)
MONOCYTES # BLD AUTO: 0.3 K/UL (ref 0.3–1)
MONOCYTES # BLD AUTO: 0.4 K/UL (ref 0.3–1)
MONOCYTES NFR BLD: 6 % (ref 4–15)
MONOCYTES NFR BLD: 6.8 % (ref 4–15)
NEUTROPHILS # BLD AUTO: 3.3 K/UL (ref 1.8–7.7)
NEUTROPHILS # BLD AUTO: 4.5 K/UL (ref 1.8–7.7)
NEUTROPHILS NFR BLD: 72.7 % (ref 38–73)
NEUTROPHILS NFR BLD: 76.5 % (ref 38–73)
NRBC BLD-RTO: 0 /100 WBC
NRBC BLD-RTO: 0 /100 WBC
PHOSPHATE SERPL-MCNC: 4.1 MG/DL (ref 2.7–4.5)
PLATELET # BLD AUTO: 53 K/UL (ref 150–350)
PLATELET # BLD AUTO: 62 K/UL (ref 150–350)
PMV BLD AUTO: 11.4 FL (ref 9.2–12.9)
PMV BLD AUTO: 11.5 FL (ref 9.2–12.9)
POCT GLUCOSE: 191 MG/DL (ref 70–110)
POCT GLUCOSE: 213 MG/DL (ref 70–110)
POCT GLUCOSE: 215 MG/DL (ref 70–110)
POCT GLUCOSE: 223 MG/DL (ref 70–110)
POCT GLUCOSE: 297 MG/DL (ref 70–110)
POTASSIUM SERPL-SCNC: 4 MMOL/L (ref 3.5–5.1)
POTASSIUM SERPL-SCNC: 4 MMOL/L (ref 3.5–5.1)
PROT SERPL-MCNC: 5 G/DL (ref 6–8.4)
PROT SERPL-MCNC: 5.1 G/DL (ref 6–8.4)
PROTHROMBIN TIME: 11 SEC (ref 9–12.5)
RBC # BLD AUTO: 2.39 M/UL (ref 4.6–6.2)
RBC # BLD AUTO: 2.5 M/UL (ref 4.6–6.2)
SODIUM SERPL-SCNC: 134 MMOL/L (ref 136–145)
SODIUM SERPL-SCNC: 134 MMOL/L (ref 136–145)
TACROLIMUS BLD-MCNC: 2.1 NG/ML (ref 5–15)
WBC # BLD AUTO: 4.28 K/UL (ref 3.9–12.7)
WBC # BLD AUTO: 6.17 K/UL (ref 3.9–12.7)

## 2019-05-01 PROCEDURE — 71000033 HC RECOVERY, INTIAL HOUR: Performed by: SURGERY

## 2019-05-01 PROCEDURE — 99233 PR SUBSEQUENT HOSPITAL CARE,LEVL III: ICD-10-PCS | Mod: 24,,, | Performed by: NURSE PRACTITIONER

## 2019-05-01 PROCEDURE — 25000003 PHARM REV CODE 250: Performed by: STUDENT IN AN ORGANIZED HEALTH CARE EDUCATION/TRAINING PROGRAM

## 2019-05-01 PROCEDURE — 80053 COMPREHEN METABOLIC PANEL: CPT | Mod: 91

## 2019-05-01 PROCEDURE — 85610 PROTHROMBIN TIME: CPT

## 2019-05-01 PROCEDURE — 27000221 HC OXYGEN, UP TO 24 HOURS

## 2019-05-01 PROCEDURE — S5571 INSULIN DISPOS PEN 3 ML: HCPCS | Performed by: NURSE PRACTITIONER

## 2019-05-01 PROCEDURE — 36000707: Performed by: SURGERY

## 2019-05-01 PROCEDURE — 37000009 HC ANESTHESIA EA ADD 15 MINS: Performed by: SURGERY

## 2019-05-01 PROCEDURE — 27201423 OPTIME MED/SURG SUP & DEVICES STERILE SUPPLY: Performed by: SURGERY

## 2019-05-01 PROCEDURE — 63600175 PHARM REV CODE 636 W HCPCS: Performed by: TRANSPLANT SURGERY

## 2019-05-01 PROCEDURE — 63600175 PHARM REV CODE 636 W HCPCS: Performed by: PEDIATRICS

## 2019-05-01 PROCEDURE — 99232 SBSQ HOSP IP/OBS MODERATE 35: CPT | Mod: ,,, | Performed by: NURSE PRACTITIONER

## 2019-05-01 PROCEDURE — 63600175 PHARM REV CODE 636 W HCPCS: Performed by: NURSE ANESTHETIST, CERTIFIED REGISTERED

## 2019-05-01 PROCEDURE — 63600175 PHARM REV CODE 636 W HCPCS

## 2019-05-01 PROCEDURE — 63600175 PHARM REV CODE 636 W HCPCS: Performed by: PHYSICIAN ASSISTANT

## 2019-05-01 PROCEDURE — D9220A PRA ANESTHESIA: ICD-10-PCS | Mod: ,,, | Performed by: ANESTHESIOLOGY

## 2019-05-01 PROCEDURE — 36000706: Performed by: SURGERY

## 2019-05-01 PROCEDURE — 25000003 PHARM REV CODE 250: Performed by: NURSE ANESTHETIST, CERTIFIED REGISTERED

## 2019-05-01 PROCEDURE — 25000003 PHARM REV CODE 250: Performed by: TRANSPLANT SURGERY

## 2019-05-01 PROCEDURE — 20600001 HC STEP DOWN PRIVATE ROOM

## 2019-05-01 PROCEDURE — 49000 EXPLORATION OF ABDOMEN: CPT | Mod: 78,,, | Performed by: SURGERY

## 2019-05-01 PROCEDURE — 37000008 HC ANESTHESIA 1ST 15 MINUTES: Performed by: SURGERY

## 2019-05-01 PROCEDURE — 63600175 PHARM REV CODE 636 W HCPCS: Performed by: NURSE PRACTITIONER

## 2019-05-01 PROCEDURE — 49000 PR EXPLORATORY OF ABDOMEN: ICD-10-PCS | Mod: 78,,, | Performed by: SURGERY

## 2019-05-01 PROCEDURE — 71000039 HC RECOVERY, EACH ADD'L HOUR: Performed by: SURGERY

## 2019-05-01 PROCEDURE — 25000003 PHARM REV CODE 250: Performed by: NURSE PRACTITIONER

## 2019-05-01 PROCEDURE — 80053 COMPREHEN METABOLIC PANEL: CPT

## 2019-05-01 PROCEDURE — 85025 COMPLETE CBC W/AUTO DIFF WBC: CPT

## 2019-05-01 PROCEDURE — 94761 N-INVAS EAR/PLS OXIMETRY MLT: CPT

## 2019-05-01 PROCEDURE — 99232 PR SUBSEQUENT HOSPITAL CARE,LEVL II: ICD-10-PCS | Mod: ,,, | Performed by: NURSE PRACTITIONER

## 2019-05-01 PROCEDURE — 25000003 PHARM REV CODE 250: Performed by: PEDIATRICS

## 2019-05-01 PROCEDURE — 88300 TISSUE SPECIMEN TO PATHOLOGY - SURGERY: ICD-10-PCS | Mod: 26,,, | Performed by: PATHOLOGY

## 2019-05-01 PROCEDURE — 63600175 PHARM REV CODE 636 W HCPCS: Performed by: STUDENT IN AN ORGANIZED HEALTH CARE EDUCATION/TRAINING PROGRAM

## 2019-05-01 PROCEDURE — 80197 ASSAY OF TACROLIMUS: CPT

## 2019-05-01 PROCEDURE — 85730 THROMBOPLASTIN TIME PARTIAL: CPT

## 2019-05-01 PROCEDURE — 82962 GLUCOSE BLOOD TEST: CPT | Performed by: TRANSPLANT SURGERY

## 2019-05-01 PROCEDURE — 88300 SURGICAL PATH GROSS: CPT | Performed by: PATHOLOGY

## 2019-05-01 PROCEDURE — 84100 ASSAY OF PHOSPHORUS: CPT

## 2019-05-01 PROCEDURE — 63600175 PHARM REV CODE 636 W HCPCS: Performed by: ANESTHESIOLOGY

## 2019-05-01 PROCEDURE — 83735 ASSAY OF MAGNESIUM: CPT

## 2019-05-01 PROCEDURE — C1729 CATH, DRAINAGE: HCPCS | Performed by: SURGERY

## 2019-05-01 PROCEDURE — 88300 SURGICAL PATH GROSS: CPT | Mod: 26,,, | Performed by: PATHOLOGY

## 2019-05-01 PROCEDURE — 86850 RBC ANTIBODY SCREEN: CPT

## 2019-05-01 PROCEDURE — D9220A PRA ANESTHESIA: Mod: ,,, | Performed by: ANESTHESIOLOGY

## 2019-05-01 PROCEDURE — 99233 SBSQ HOSP IP/OBS HIGH 50: CPT | Mod: 24,,, | Performed by: NURSE PRACTITIONER

## 2019-05-01 RX ORDER — GLYCOPYRROLATE 0.2 MG/ML
INJECTION INTRAMUSCULAR; INTRAVENOUS
Status: DISCONTINUED | OUTPATIENT
Start: 2019-05-01 | End: 2019-05-01

## 2019-05-01 RX ORDER — HYDROMORPHONE HYDROCHLORIDE 1 MG/ML
0.5 INJECTION, SOLUTION INTRAMUSCULAR; INTRAVENOUS; SUBCUTANEOUS ONCE
Status: COMPLETED | OUTPATIENT
Start: 2019-05-01 | End: 2019-05-01

## 2019-05-01 RX ORDER — TACROLIMUS 1 MG/1
8 CAPSULE ORAL 2 TIMES DAILY
Status: DISCONTINUED | OUTPATIENT
Start: 2019-05-01 | End: 2019-05-04 | Stop reason: HOSPADM

## 2019-05-01 RX ORDER — ONDANSETRON 2 MG/ML
INJECTION INTRAMUSCULAR; INTRAVENOUS
Status: DISCONTINUED | OUTPATIENT
Start: 2019-05-01 | End: 2019-05-01

## 2019-05-01 RX ORDER — FENTANYL CITRATE 50 UG/ML
25 INJECTION, SOLUTION INTRAMUSCULAR; INTRAVENOUS EVERY 5 MIN PRN
Status: COMPLETED | OUTPATIENT
Start: 2019-05-01 | End: 2019-05-01

## 2019-05-01 RX ORDER — NEOSTIGMINE METHYLSULFATE 1 MG/ML
INJECTION, SOLUTION INTRAVENOUS
Status: DISCONTINUED | OUTPATIENT
Start: 2019-05-01 | End: 2019-05-01

## 2019-05-01 RX ORDER — FENTANYL CITRATE 50 UG/ML
INJECTION, SOLUTION INTRAMUSCULAR; INTRAVENOUS
Status: DISCONTINUED | OUTPATIENT
Start: 2019-05-01 | End: 2019-05-01

## 2019-05-01 RX ORDER — LIDOCAINE HCL/PF 100 MG/5ML
SYRINGE (ML) INTRAVENOUS
Status: DISCONTINUED | OUTPATIENT
Start: 2019-05-01 | End: 2019-05-01

## 2019-05-01 RX ORDER — HYDROCODONE BITARTRATE AND ACETAMINOPHEN 5; 325 MG/1; MG/1
1 TABLET ORAL EVERY 4 HOURS PRN
Status: DISCONTINUED | OUTPATIENT
Start: 2019-05-01 | End: 2019-05-04 | Stop reason: HOSPADM

## 2019-05-01 RX ORDER — DOCUSATE SODIUM 100 MG/1
100 CAPSULE, LIQUID FILLED ORAL 3 TIMES DAILY PRN
Refills: 0 | COMMUNITY
Start: 2019-05-01 | End: 2019-05-01 | Stop reason: HOSPADM

## 2019-05-01 RX ORDER — ROCURONIUM BROMIDE 10 MG/ML
INJECTION, SOLUTION INTRAVENOUS
Status: DISCONTINUED | OUTPATIENT
Start: 2019-05-01 | End: 2019-05-01

## 2019-05-01 RX ORDER — DOCUSATE SODIUM 100 MG/1
100 CAPSULE, LIQUID FILLED ORAL 3 TIMES DAILY PRN
Status: DISCONTINUED | OUTPATIENT
Start: 2019-05-01 | End: 2019-05-02

## 2019-05-01 RX ORDER — SODIUM CHLORIDE 0.9 % (FLUSH) 0.9 %
10 SYRINGE (ML) INJECTION
Status: DISCONTINUED | OUTPATIENT
Start: 2019-05-01 | End: 2019-05-01 | Stop reason: HOSPADM

## 2019-05-01 RX ORDER — TACROLIMUS 1 MG/1
2 CAPSULE ORAL ONCE
Status: COMPLETED | OUTPATIENT
Start: 2019-05-01 | End: 2019-05-01

## 2019-05-01 RX ORDER — SODIUM CHLORIDE 9 MG/ML
INJECTION, SOLUTION INTRAVENOUS CONTINUOUS
Status: DISCONTINUED | OUTPATIENT
Start: 2019-05-01 | End: 2019-05-01

## 2019-05-01 RX ORDER — OXYCODONE HYDROCHLORIDE 10 MG/1
5-10 TABLET ORAL EVERY 4 HOURS PRN
Qty: 40 TABLET | Refills: 0 | Status: SHIPPED | OUTPATIENT
Start: 2019-05-01 | End: 2019-06-03 | Stop reason: ALTCHOICE

## 2019-05-01 RX ORDER — HYDROMORPHONE HYDROCHLORIDE 1 MG/ML
INJECTION, SOLUTION INTRAMUSCULAR; INTRAVENOUS; SUBCUTANEOUS
Status: COMPLETED
Start: 2019-05-01 | End: 2019-05-01

## 2019-05-01 RX ORDER — MIDAZOLAM HYDROCHLORIDE 1 MG/ML
INJECTION, SOLUTION INTRAMUSCULAR; INTRAVENOUS
Status: DISCONTINUED | OUTPATIENT
Start: 2019-05-01 | End: 2019-05-01

## 2019-05-01 RX ORDER — PROPOFOL 10 MG/ML
VIAL (ML) INTRAVENOUS
Status: DISCONTINUED | OUTPATIENT
Start: 2019-05-01 | End: 2019-05-01

## 2019-05-01 RX ORDER — FENTANYL CITRATE 50 UG/ML
INJECTION, SOLUTION INTRAMUSCULAR; INTRAVENOUS
Status: COMPLETED
Start: 2019-05-01 | End: 2019-05-01

## 2019-05-01 RX ORDER — CEFAZOLIN SODIUM 1 G/3ML
1 INJECTION, POWDER, FOR SOLUTION INTRAMUSCULAR; INTRAVENOUS ONCE
Status: COMPLETED | OUTPATIENT
Start: 2019-05-01 | End: 2019-05-01

## 2019-05-01 RX ORDER — SODIUM CHLORIDE 9 MG/ML
INJECTION, SOLUTION INTRAVENOUS CONTINUOUS PRN
Status: DISCONTINUED | OUTPATIENT
Start: 2019-05-01 | End: 2019-05-01

## 2019-05-01 RX ORDER — HYDROMORPHONE HYDROCHLORIDE 1 MG/ML
0.2 INJECTION, SOLUTION INTRAMUSCULAR; INTRAVENOUS; SUBCUTANEOUS EVERY 5 MIN PRN
Status: COMPLETED | OUTPATIENT
Start: 2019-05-01 | End: 2019-05-01

## 2019-05-01 RX ADMIN — ROCURONIUM BROMIDE 10 MG: 10 INJECTION, SOLUTION INTRAVENOUS at 08:05

## 2019-05-01 RX ADMIN — HYDROMORPHONE HYDROCHLORIDE 0.2 MG: 1 INJECTION, SOLUTION INTRAMUSCULAR; INTRAVENOUS; SUBCUTANEOUS at 09:05

## 2019-05-01 RX ADMIN — FENTANYL CITRATE 25 MCG: 50 INJECTION INTRAMUSCULAR; INTRAVENOUS at 10:05

## 2019-05-01 RX ADMIN — FENTANYL CITRATE 25 MCG: 50 INJECTION INTRAMUSCULAR; INTRAVENOUS at 09:05

## 2019-05-01 RX ADMIN — HEPARIN SODIUM 5000 UNITS: 5000 INJECTION, SOLUTION INTRAVENOUS; SUBCUTANEOUS at 02:05

## 2019-05-01 RX ADMIN — GLYCOPYRROLATE 0.6 MG: 0.2 INJECTION, SOLUTION INTRAMUSCULAR; INTRAVENOUS at 08:05

## 2019-05-01 RX ADMIN — INSULIN ASPART 2 UNITS: 100 INJECTION, SOLUTION INTRAVENOUS; SUBCUTANEOUS at 08:05

## 2019-05-01 RX ADMIN — TACROLIMUS 8 MG: 1 CAPSULE ORAL at 05:05

## 2019-05-01 RX ADMIN — SIMETHICONE CHEW TAB 80 MG 80 MG: 80 TABLET ORAL at 08:05

## 2019-05-01 RX ADMIN — TACROLIMUS 6 MG: 5 CAPSULE ORAL at 11:05

## 2019-05-01 RX ADMIN — HYDROMORPHONE HYDROCHLORIDE 0.5 MG: 1 INJECTION, SOLUTION INTRAMUSCULAR; INTRAVENOUS; SUBCUTANEOUS at 11:05

## 2019-05-01 RX ADMIN — OXYCODONE HYDROCHLORIDE 10 MG: 10 TABLET ORAL at 08:05

## 2019-05-01 RX ADMIN — NYSTATIN 500000 UNITS: 500000 SUSPENSION ORAL at 02:05

## 2019-05-01 RX ADMIN — SODIUM CHLORIDE: 0.9 INJECTION, SOLUTION INTRAVENOUS at 07:05

## 2019-05-01 RX ADMIN — LIDOCAINE HYDROCHLORIDE 100 MG: 20 INJECTION, SOLUTION INTRAVENOUS at 08:05

## 2019-05-01 RX ADMIN — MYCOPHENOLATE MOFETIL 1000 MG: 250 CAPSULE ORAL at 08:05

## 2019-05-01 RX ADMIN — PROPOFOL 150 MG: 10 INJECTION, EMULSION INTRAVENOUS at 08:05

## 2019-05-01 RX ADMIN — INSULIN DETEMIR 16 UNITS: 100 INJECTION, SOLUTION SUBCUTANEOUS at 08:05

## 2019-05-01 RX ADMIN — FENTANYL CITRATE 25 MCG: 50 INJECTION, SOLUTION INTRAMUSCULAR; INTRAVENOUS at 08:05

## 2019-05-01 RX ADMIN — METHYLPREDNISOLONE SODIUM SUCCINATE 20 MG: 40 INJECTION, POWDER, FOR SOLUTION INTRAMUSCULAR; INTRAVENOUS at 08:05

## 2019-05-01 RX ADMIN — INSULIN ASPART 6 UNITS: 100 INJECTION, SOLUTION INTRAVENOUS; SUBCUTANEOUS at 05:05

## 2019-05-01 RX ADMIN — PROPOFOL 200 MG: 10 INJECTION, EMULSION INTRAVENOUS at 08:05

## 2019-05-01 RX ADMIN — FAMOTIDINE 20 MG: 20 TABLET, FILM COATED ORAL at 08:05

## 2019-05-01 RX ADMIN — INSULIN ASPART 3 UNITS: 100 INJECTION, SOLUTION INTRAVENOUS; SUBCUTANEOUS at 05:05

## 2019-05-01 RX ADMIN — CEFAZOLIN 2 G: 330 INJECTION, POWDER, FOR SOLUTION INTRAMUSCULAR; INTRAVENOUS at 08:05

## 2019-05-01 RX ADMIN — MYCOPHENOLATE MOFETIL 1000 MG: 250 CAPSULE ORAL at 11:05

## 2019-05-01 RX ADMIN — FENTANYL CITRATE 50 MCG: 50 INJECTION, SOLUTION INTRAMUSCULAR; INTRAVENOUS at 08:05

## 2019-05-01 RX ADMIN — MIDAZOLAM HYDROCHLORIDE 2 MG: 1 INJECTION, SOLUTION INTRAMUSCULAR; INTRAVENOUS at 08:05

## 2019-05-01 RX ADMIN — METHYLPREDNISOLONE SODIUM SUCCINATE 20 MG: 40 INJECTION, POWDER, FOR SOLUTION INTRAMUSCULAR; INTRAVENOUS at 11:05

## 2019-05-01 RX ADMIN — NEOSTIGMINE METHYLSULFATE 5 MG: 1 INJECTION INTRAVENOUS at 08:05

## 2019-05-01 RX ADMIN — FUROSEMIDE 40 MG: 40 TABLET ORAL at 11:05

## 2019-05-01 RX ADMIN — SULFAMETHOXAZOLE AND TRIMETHOPRIM 1 TABLET: 400; 80 TABLET ORAL at 11:05

## 2019-05-01 RX ADMIN — ONDANSETRON 4 MG: 2 INJECTION INTRAMUSCULAR; INTRAVENOUS at 08:05

## 2019-05-01 RX ADMIN — HEPARIN SODIUM 5000 UNITS: 5000 INJECTION, SOLUTION INTRAVENOUS; SUBCUTANEOUS at 08:05

## 2019-05-01 RX ADMIN — TACROLIMUS 2 MG: 1 CAPSULE ORAL at 12:05

## 2019-05-01 RX ADMIN — OXYCODONE HYDROCHLORIDE 10 MG: 10 TABLET ORAL at 09:05

## 2019-05-01 RX ADMIN — SODIUM CHLORIDE: 0.9 INJECTION, SOLUTION INTRAVENOUS at 09:05

## 2019-05-01 RX ADMIN — ROCURONIUM BROMIDE 40 MG: 10 INJECTION, SOLUTION INTRAVENOUS at 08:05

## 2019-05-01 RX ADMIN — NYSTATIN 500000 UNITS: 500000 SUSPENSION ORAL at 05:05

## 2019-05-01 NOTE — PROGRESS NOTES
EDUCATION NOTE:    Met with Cesario Bailey and his caregivers with Janee (Maldivian interpretor) to provide teaching re: immunosuppressant medications.  Reviewed medication section of the Liver Transplant Education book that was provided.  Emphasized the importance of compliance, role of the blue medication card, concerns for drug interactions, and process of obtaining refills.  Counseled regarding Prograf, Cellcept , prednisone, including directions for use, monitoring, how to handle missed doses, and side effects.  He and wife verbalized understanding and had the opportunity to ask questions.

## 2019-05-01 NOTE — PROGRESS NOTES
Met with patient and his wife for  discharge teaching.  Reviewed My New Journey: Living Smart After My Liver Transplant.  Maya form [facility] translated this session, Sections reviewed were: First Steps, Appointments and Prevention.  Medication will be reviewed by Pharm D.  Allowed time for questions and answers.  Asked patient to complete the review questions in the discharge book.

## 2019-05-01 NOTE — PT/OT/SLP PROGRESS
Physical Therapy  Pt Not Seen    Patient Name:  Cesario Bailey   MRN:  [id number]    Patient not seen today secondary to  Other (Comment)(Pt INDIRA having surgery (exp lap) 8:45 am.   PT to write discharge summary.  New orders required to resume services. ).     Danielle Muñoz, PTA  5/1/2019

## 2019-05-01 NOTE — TRANSFER OF CARE
"Anesthesia Transfer of Care Note    Patient: Cesario Bailey    Procedure(s) Performed: Procedure(s) (LRB):  LAPAROTOMY, EXPLORATORY (Bilateral)    Patient location: PACU    Anesthesia Type: general    Transport from OR: Transported from OR on 6-10 L/min O2 by face mask with adequate spontaneous ventilation    Post pain: adequate analgesia    Post assessment: no apparent anesthetic complications and tolerated procedure well    Post vital signs: stable    Level of consciousness: awake and alert    Nausea/Vomiting: no nausea/vomiting    Complications: none    Transfer of care protocol was followed      Last vitals:   Visit Vitals  BP (!) 145/67 (BP Location: Left arm, Patient Position: Lying)   Pulse 76   Temp 37.1 °C (98.7 °F) (Oral)   Resp 16   Ht 5' 6" (1.676 m)   Wt 88.1 kg (194 lb 3.6 oz)   SpO2 98%   BMI 31.35 kg/m²     "

## 2019-05-01 NOTE — PROGRESS NOTES
[facility]  Liver Transplant  Progress Note    Patient Name: Cesario Bailey  MRN: [id number]  Admission Date: 2019  Hospital Length of Stay: 5 days  Code Status: Full Code  Primary Care Provider: Primary Doctor No  Post-Operative Day: 5    ORGAN:   LIVER  Disease Etiology: Primary Liver Malignancy: Hepatoma (HCC) and Cirrhosis  Donor Type:    - Brain Death  CDC High Risk:   No  Donor CMV Status:   Donor CMV Status: Negative  Donor HBcAB:   Negative  Donor HCV Status:   Negative  Donor HBV MARIA R: Negative  Donor HCV MARIA R: Negative  Whole or Partial: Whole Liver  Biliary Anastomosis: End to End  Arterial Anatomy: Standard  Subjective:     History of Present Illness:  Mr. Bailey is a 54 y/o M with ESLD 2/2 ETOH and HCC s/p tace x 2. He was admitted for liver transplant.     Hospital Course:  He is now s/p DBD  liver transplant on 2019. This patient will follow the Steroid Induction protocol.  This patients immunosuppression will include a steroid taper over 6 weeks, Cellcept for 3 months and Prograf maintenance.  Opportunistic infection prophylaxis will include acyclovir for 3 months (CMV D- R-), Bactrim for 6 months, and nystatin. Surgery was without complication. He was transferred to TSU on POD#3.     Interval History: No acute events overnight. Patient returned from Or ~1115, medical JANICE removed.  Treated w Dilaudid 0.5mg Iv x1.  Cont PO PRN pain meds.  LFTs cont to improve.  Pt w good renal function.  Good UOP. Tolerating diet, passing flatus, +BM.  H/H improved, no overt signs of bleeding.  Repeat CBC improved (8.4).  No plan to transfuse.  Tentative plan to d/c tomorrow.    Scheduled Meds:   [START ON 2019] acyclovir  400 mg Oral BID    ergocalciferol  50,000 Units Oral Q7 Days    famotidine  20 mg Oral QHS    furosemide  40 mg Oral Daily    heparin (porcine)  5,000 Units Subcutaneous Q8H    HYDROmorphone  1 mg Intravenous Once    insulin aspart U-100  4-6 Units  Subcutaneous TIDWM    methylPREDNISolone sodium succinate  20 mg Intravenous Q12H    Followed by    [START ON 5/2/2019] predniSONE  20 mg Oral Daily    mycophenolate  1,000 mg Oral BID    nystatin  500,000 Units Mouth/Throat TID PC    sulfamethoxazole-trimethoprim 400-80mg  1 tablet Oral Daily    tacrolimus  8 mg Oral BID     Continuous Infusions:   sodium chloride 0.9% 75 mL/hr at 05/01/19 0929    insulin (HUMAN R) infusion (adults) 1 Units/hr (05/01/19 1120)     PRN Meds:sodium chloride, dextrose 50%, dextrose 50%, docusate sodium, glucagon (human recombinant), glucose, glucose, hydrALAZINE, HYDROcodone-acetaminophen, insulin aspart U-100, oxyCODONE, oxyCODONE, simethicone    Review of Systems   Constitutional: Positive for activity change and appetite change. Negative for chills and fever.   Respiratory: Negative for shortness of breath and wheezing.    Cardiovascular: Positive for leg swelling.   Gastrointestinal: Positive for abdominal distention and abdominal pain. Negative for nausea and vomiting.   Genitourinary: Negative for decreased urine volume, difficulty urinating, dysuria and hematuria.   Skin: Positive for wound.   Allergic/Immunologic: Positive for immunocompromised state.   Neurological: Negative for dizziness and weakness.   Psychiatric/Behavioral: Negative for agitation and decreased concentration.     Objective:     Vital Signs (Most Recent):  Temp: 98.3 °F (36.8 °C) (05/01/19 1120)  Pulse: 72 (05/01/19 1120)  Resp: 16 (05/01/19 1120)  BP: (!) 141/67 (05/01/19 1120)  SpO2: 98 % (05/01/19 1120) Vital Signs (24h Range):  Temp:  [97.5 °F (36.4 °C)-98.7 °F (37.1 °C)] 98.3 °F (36.8 °C)  Pulse:  [64-87] 72  Resp:  [11-17] 16  SpO2:  [95 %-100 %] 98 %  BP: (133-160)/(67-83) 141/67     Weight: 88.1 kg (194 lb 3.6 oz)  Body mass index is 31.35 kg/m².    Intake/Output - Last 3 Shifts       04/29 0700 - 04/30 0659 04/30 0700 - 05/01 0659 05/01 0700 - 05/02 0659    P.O. 400 1460     I.V. (mL/kg)  544.2 (6) 90.7 (1) 700 (7.9)    Blood 166.7      Total Intake(mL/kg) 1110.8 (12.3) 1550.7 (17.6) 700 (7.9)    Urine (mL/kg/hr) 580 (0.3) 0 (0)     Emesis/NG output 0 0     Drains 205 90     Other 0 0     Stool 0 0     Blood 0 0     Total Output 785 90     Net +325.8 +1460.7 +700           Urine Occurrence 3 x 10 x     Stool Occurrence 0 x 3 x 0 x    Emesis Occurrence 0 x 0 x           Physical Exam   Constitutional: He is oriented to person, place, and time. No distress.   Cardiovascular: Normal rate and regular rhythm.   No murmur heard.  Pulmonary/Chest: Effort normal and breath sounds normal. He has no wheezes. He has no rales.   Abdominal: Soft. He exhibits distension. There is no tenderness. There is no rebound and no guarding.       Neurological: He is alert and oriented to person, place, and time.   Skin: He is not diaphoretic.   Psychiatric: He has a normal mood and affect. His behavior is normal. Judgment and thought content normal.   Nursing note and vitals reviewed.      Laboratory:  Immunosuppressants         Stop Route Frequency     tacrolimus capsule 8 mg      -- Oral 2 times daily     mycophenolate capsule 1,000 mg      -- Oral 2 times daily        CBC:   Recent Labs   Lab 05/01/19  0914   WBC 6.17   RBC 2.50*   HGB 8.4*   HCT 23.5*   PLT 62*   MCV 94   MCH 33.6*   MCHC 35.7     CMP:   Recent Labs   Lab 05/01/19  0914   *   CALCIUM 8.1*   ALBUMIN 2.6*   PROT 5.1*   *   K 4.0   CO2 21*      BUN 21*   CREATININE 0.8   ALKPHOS 104   *   AST 66*   BILITOT 1.4*     Tacrolimus Lvl   Date Value Ref Range Status   05/01/2019 2.1 (L) 5.0 - 15.0 ng/mL Final     Comment:     Testing performed by Liquid Chromatography-Tandem  Mass Spectrometry (LC-MS/MS).  This test was developed and its performance characteristics  determined by [facility], Department of Pathology  and Laboratory Medicine in a manner consistent with CLIA  requirements. It has not been cleared or approved by  the [place]  Food and Drug Administration.  This test is used for clinical   purposes.  It should not be regarded as investigational or for  research.     04/30/2019 2.8 (L) 5.0 - 15.0 ng/mL Final     Comment:     Testing performed by Liquid Chromatography-Tandem  Mass Spectrometry (LC-MS/MS).  This test was developed and its performance characteristics  determined by [facility], Department of Pathology  and Laboratory Medicine in a manner consistent with CLIA  requirements. It has not been cleared or approved by the [place]  Food and Drug Administration.  This test is used for clinical   purposes.  It should not be regarded as investigational or for  research.     04/29/2019 <1.5 (L) 5.0 - 15.0 ng/mL Final     Comment:     Testing performed by Liquid Chromatography-Tandem  Mass Spectrometry (LC-MS/MS).  This test was developed and its performance characteristics  determined by [facility], Department of Pathology  and Laboratory Medicine in a manner consistent with CLIA  requirements. It has not been cleared or approved by the [place]  Food and Drug Administration.  This test is used for clinical   purposes.  It should not be regarded as investigational or for  research.     04/28/2019 <1.5 (L) 5.0 - 15.0 ng/mL Final     Comment:     Testing performed by Liquid Chromatography-Tandem  Mass Spectrometry (LC-MS/MS).  This test was developed and its performance characteristics  determined by [facility], Department of Pathology  and Laboratory Medicine in a manner consistent with CLIA  requirements. It has not been cleared or approved by the [place]  Food and Drug Administration.  This test is used for clinical   purposes.  It should not be regarded as investigational or for  research.         Labs within the past 24 hours have been reviewed.    Diagnostic Results:  None    Assessment/Plan:     * Liver replaced by transplant  - LFTs improving daily  - Tolerating diet  - Passing flatus, +BM  - Medial JANICE  drain with significant resistance during attempted removal POD #4, to OR to remove today.         Anemia of chronic disease  - Due to hx of ESLD  - See acute blood loss anemia.   - Monitor with daily cbc.       Acidosis  - Improving. Continue to monitor with daily labs.       Hypomagnesemia  - Replace with IV Mg. Monitor with daily labs.       Acute blood loss anemia  - Expected post operatively  - H/H now improving.  Cont to monitor.  No plan to transfuse.       Type 2 diabetes mellitus with hyperglycemia  - Endocrine following for help with management. Appreciate their assistance.         Prophylactic immunotherapy  - See long term use of immunosuppressant medication.       Long-term use of immunosuppressant medication  - Continue prograf, cellcept, steroids. Continue to monitor prograf level daily, monitor for toxic side effects, and adjust for therapeutic dose.       Thrombocytopenia due to hypersplenism  - Likely to continue to improve post liver transplant.           Debility/Functional status: Patient debilitated by evidence of Muscle wasting and atrophy and Weakness. Physical and occupational therapy ordered daily to evaluate and treat. Debility was: present on admission.    VTE Risk Mitigation (From admission, onward)        Ordered     heparin (porcine) injection 5,000 Units  Every 8 hours      04/27/19 0001     Place sequential compression device  Until discontinued      04/27/19 0001     IP VTE HIGH RISK PATIENT  Once      04/27/19 0001          The patients clinical status was discussed at multidisplinary rounds, involving transplant surgery, transplant medicine, pharmacy, nursing, nutrition, and social work    Discharge Planning:  Tentative plan to discharge tomorrow.    Sarah Persaud NP  Liver Transplant  [facility]

## 2019-05-01 NOTE — SUBJECTIVE & OBJECTIVE
Scheduled Meds:   [START ON 5/6/2019] acyclovir  400 mg Oral BID    ergocalciferol  50,000 Units Oral Q7 Days    famotidine  20 mg Oral QHS    furosemide  40 mg Oral Daily    heparin (porcine)  5,000 Units Subcutaneous Q8H    HYDROmorphone  1 mg Intravenous Once    insulin aspart U-100  4-6 Units Subcutaneous TIDWM    methylPREDNISolone sodium succinate  20 mg Intravenous Q12H    Followed by    [START ON 5/2/2019] predniSONE  20 mg Oral Daily    mycophenolate  1,000 mg Oral BID    nystatin  500,000 Units Mouth/Throat TID PC    sulfamethoxazole-trimethoprim 400-80mg  1 tablet Oral Daily    tacrolimus  8 mg Oral BID     Continuous Infusions:   sodium chloride 0.9% 75 mL/hr at 05/01/19 0929    insulin (HUMAN R) infusion (adults) 1 Units/hr (05/01/19 1120)     PRN Meds:sodium chloride, dextrose 50%, dextrose 50%, docusate sodium, glucagon (human recombinant), glucose, glucose, hydrALAZINE, HYDROcodone-acetaminophen, insulin aspart U-100, oxyCODONE, oxyCODONE, simethicone    Review of Systems   Constitutional: Positive for activity change and appetite change. Negative for chills and fever.   Respiratory: Negative for shortness of breath and wheezing.    Cardiovascular: Positive for leg swelling.   Gastrointestinal: Positive for abdominal distention and abdominal pain. Negative for nausea and vomiting.   Genitourinary: Negative for decreased urine volume, difficulty urinating, dysuria and hematuria.   Skin: Positive for wound.   Allergic/Immunologic: Positive for immunocompromised state.   Neurological: Negative for dizziness and weakness.   Psychiatric/Behavioral: Negative for agitation and decreased concentration.     Objective:     Vital Signs (Most Recent):  Temp: 98.3 °F (36.8 °C) (05/01/19 1120)  Pulse: 72 (05/01/19 1120)  Resp: 16 (05/01/19 1120)  BP: (!) 141/67 (05/01/19 1120)  SpO2: 98 % (05/01/19 1120) Vital Signs (24h Range):  Temp:  [97.5 °F (36.4 °C)-98.7 °F (37.1 °C)] 98.3 °F (36.8 °C)  Pulse:   [64-87] 72  Resp:  [11-17] 16  SpO2:  [95 %-100 %] 98 %  BP: (133-160)/(67-83) 141/67     Weight: 88.1 kg (194 lb 3.6 oz)  Body mass index is 31.35 kg/m².    Intake/Output - Last 3 Shifts       04/29 0700 - 04/30 0659 04/30 0700 - 05/01 0659 05/01 0700 - 05/02 0659    P.O. 400 1460     I.V. (mL/kg) 544.2 (6) 90.7 (1) 700 (7.9)    Blood 166.7      Total Intake(mL/kg) 1110.8 (12.3) 1550.7 (17.6) 700 (7.9)    Urine (mL/kg/hr) 580 (0.3) 0 (0)     Emesis/NG output 0 0     Drains 205 90     Other 0 0     Stool 0 0     Blood 0 0     Total Output 785 90     Net +325.8 +1460.7 +700           Urine Occurrence 3 x 10 x     Stool Occurrence 0 x 3 x 0 x    Emesis Occurrence 0 x 0 x           Physical Exam   Constitutional: He is oriented to person, place, and time. No distress.   Cardiovascular: Normal rate and regular rhythm.   No murmur heard.  Pulmonary/Chest: Effort normal and breath sounds normal. He has no wheezes. He has no rales.   Abdominal: Soft. He exhibits distension. There is no tenderness. There is no rebound and no guarding.       Neurological: He is alert and oriented to person, place, and time.   Skin: He is not diaphoretic.   Psychiatric: He has a normal mood and affect. His behavior is normal. Judgment and thought content normal.   Nursing note and vitals reviewed.      Laboratory:  Immunosuppressants         Stop Route Frequency     tacrolimus capsule 8 mg      -- Oral 2 times daily     mycophenolate capsule 1,000 mg      -- Oral 2 times daily        CBC:   Recent Labs   Lab 05/01/19  0914   WBC 6.17   RBC 2.50*   HGB 8.4*   HCT 23.5*   PLT 62*   MCV 94   MCH 33.6*   MCHC 35.7     CMP:   Recent Labs   Lab 05/01/19 0914   *   CALCIUM 8.1*   ALBUMIN 2.6*   PROT 5.1*   *   K 4.0   CO2 21*      BUN 21*   CREATININE 0.8   ALKPHOS 104   *   AST 66*   BILITOT 1.4*     Tacrolimus Lvl   Date Value Ref Range Status   05/01/2019 2.1 (L) 5.0 - 15.0 ng/mL Final     Comment:     Testing  performed by Liquid Chromatography-Tandem  Mass Spectrometry (LC-MS/MS).  This test was developed and its performance characteristics  determined by [facility], Department of Pathology  and Laboratory Medicine in a manner consistent with CLIA  requirements. It has not been cleared or approved by the [place]  Food and Drug Administration.  This test is used for clinical   purposes.  It should not be regarded as investigational or for  research.     04/30/2019 2.8 (L) 5.0 - 15.0 ng/mL Final     Comment:     Testing performed by Liquid Chromatography-Tandem  Mass Spectrometry (LC-MS/MS).  This test was developed and its performance characteristics  determined by [facility], Department of Pathology  and Laboratory Medicine in a manner consistent with CLIA  requirements. It has not been cleared or approved by the [place]  Food and Drug Administration.  This test is used for clinical   purposes.  It should not be regarded as investigational or for  research.     04/29/2019 <1.5 (L) 5.0 - 15.0 ng/mL Final     Comment:     Testing performed by Liquid Chromatography-Tandem  Mass Spectrometry (LC-MS/MS).  This test was developed and its performance characteristics  determined by [facility], Department of Pathology  and Laboratory Medicine in a manner consistent with CLIA  requirements. It has not been cleared or approved by the [place]  Food and Drug Administration.  This test is used for clinical   purposes.  It should not be regarded as investigational or for  research.     04/28/2019 <1.5 (L) 5.0 - 15.0 ng/mL Final     Comment:     Testing performed by Liquid Chromatography-Tandem  Mass Spectrometry (LC-MS/MS).  This test was developed and its performance characteristics  determined by [facility], Department of Pathology  and Laboratory Medicine in a manner consistent with CLIA  requirements. It has not been cleared or approved by the [place]  Food and Drug Administration.  This test is used for  clinical   purposes.  It should not be regarded as investigational or for  research.         Labs within the past 24 hours have been reviewed.    Diagnostic Results:  None

## 2019-05-01 NOTE — PROGRESS NOTES
"[facility]  Adult Nutrition  Progress Note    SUMMARY       Recommendations    Recommendation/Intervention: 1.) Advance diet as tolerated to diabetic. 2.) Pt to receive additional diet instruction at follow up   Goals: Pt to consume/tolerate >75% EEN and EPN.   Nutrition Goal Status: progressing towards goal  Communication of RD Recs: (POC)    Reason for Assessment    Reason For Assessment: RD follow-up  Diagnosis: transplant/postoperative complications(s/p OLTx 4/26)  Relevant Medical History: ETOH cirrhosis, ESLD  Interdisciplinary Rounds: attended  General Information Comments: Pt currently NPO for drain removal. Per chart, pt with good PO intake of diabetic diet. Noted wt increased 10kg. Attribute to fluid. No GI distress per chart. No family in room. Will perform NFPE at follow up.   Nutrition Discharge Planning: Provided post-transplant diet handouts to bedside in [place]. Will attempt to follow up for further diet instruction.     Nutrition Risk Screen    Nutrition Risk Screen: no indicators present    Nutrition/Diet History    Spiritual, Cultural Beliefs, Pent[employer]alism Practices, Values that Affect Care: no  Factors Affecting Nutritional Intake: clear liquid diet    Anthropometrics    Temp: 97.5 °F (36.4 °C)  Height Method: Stated  Height: 5' 6" (167.6 cm)  Height (inches): 66 in  Weight Method: Bed Scale  Weight: 88.1 kg (194 lb 3.6 oz)  Weight (lb): 194.23 lb  Ideal Body Weight (IBW), Male: 142 lb  % Ideal Body Weight, Male (lb): 136.78 lb  BMI (Calculated): 31.4  BMI Grade: 25 - 29.9 - overweight       Lab/Procedures/Meds    Pertinent Labs Reviewed: reviewed  Pertinent Labs Comments: Na 132, BUN 24, Glu 128, Ca 8.2, Mg 1.5, , , HbA1C 6.9  Pertinent Medications Reviewed: reviewed  Pertinent Medications Comments: famotidine, lasix, insulin, methylprednisone, mycophenolate, statin, tacrolimus      Estimated/Assessed Needs    Weight Used For Calorie Calculations: 87.2 kg (192 lb " 3.9 oz)  Energy Calorie Requirements (kcal): 2067  Energy Need Method: [place]-St Jeor(1.25 PAL)  Protein Requirements: 87-104g (1-1.2g/kg)  Weight Used For Protein Calculations: 87.2 kg (192 lb 3.9 oz)  Fluid Requirements (mL): Per MD     RDA Method (mL): 2067         Nutrition Prescription Ordered    Current Diet Order: NPO    Evaluation of Received Nutrient/Fluid Intake    I/O: +7.4L since admit  Comments: LBM 4/30  % Intake of Estimated Energy Needs: 75 - 100 %  % Meal Intake: NPO    Nutrition Risk    Level of Risk/Frequency of Follow-up: moderate     Assessment and Plan   Nutrition Problem  Increased energy needs     Related to (etiology):   healing     Signs and Symptoms (as evidenced by):   Post-liver tx      Interventions/Recommendations (treatment strategy):  Collaboration of care     Nutrition Diagnosis Status:   Continues           Monitor and Evaluation    Food and Nutrient Intake: energy intake, food and beverage intake  Food and Nutrient Adminstration: diet order  Knowledge/Beliefs/Attitudes: food and nutrition knowledge/skill  Physical Activity and Function: nutrition-related ADLs and IADLs  Anthropometric Measurements: weight, weight change, body mass index  Biochemical Data, Medical Tests and Procedures: electrolyte and renal panel, gastrointestinal profile, glucose/endocrine profile, lipid profile  Nutrition-Focused Physical Findings: overall appearance     Nutrition Follow-Up    RD Follow-up?: Yes

## 2019-05-01 NOTE — ASSESSMENT & PLAN NOTE
- LFTs improving daily  - Tolerating diet  - Passing flatus, +BM  - Medial JANICE drain with significant resistance during attempted removal POD #4, to OR to remove today.

## 2019-05-01 NOTE — SUBJECTIVE & OBJECTIVE
"Interval HPI:   Overnight events: BG below goal overnight and transition IV insulin infusion stopped per protocol. BG elevated above goal this morning and insulin infusion restarted at 1 unit/hr.   Eating:   NPO  Nausea: No  Hypoglycemia and intervention: No  Fever: No  TPN and/or TF: No    /68 (BP Location: Left arm, Patient Position: Lying)   Pulse 66   Temp 97.6 °F (36.4 °C) (Temporal)   Resp 15   Ht 5' 6" (1.676 m)   Wt 88.1 kg (194 lb 3.6 oz)   SpO2 97%   BMI 31.35 kg/m²     Labs Reviewed and Include    Recent Labs   Lab 05/01/19  0914   *   CALCIUM 8.1*   ALBUMIN 2.6*   PROT 5.1*   *   K 4.0   CO2 21*      BUN 21*   CREATININE 0.8   ALKPHOS 104   *   AST 66*   BILITOT 1.4*     Lab Results   Component Value Date    WBC 6.17 05/01/2019    HGB 8.4 (L) 05/01/2019    HCT 23.5 (L) 05/01/2019    MCV 94 05/01/2019    PLT 62 (L) 05/01/2019     No results for input(s): TSH, FREET4 in the last 168 hours.  Lab Results   Component Value Date    HGBA1C 6.9 (H) 04/26/2019       Nutritional status:   Body mass index is 31.35 kg/m².  Lab Results   Component Value Date    ALBUMIN 2.6 (L) 05/01/2019    ALBUMIN 2.6 (L) 05/01/2019    ALBUMIN 2.6 (L) 04/30/2019     No results found for: PREALBUMIN    Estimated Creatinine Clearance: 109.7 mL/min (based on SCr of 0.8 mg/dL).    Accu-Checks  Recent Labs     04/29/19  1817 04/29/19  2116 04/30/19  0204 04/30/19  0731 04/30/19  1129 04/30/19  1652 04/30/19  2122 04/30/19  2204 05/01/19  0213 05/01/19  0741   POCTGLUCOSE 190* 141* 108 137* 149* 151* 99 108 215* 223*       Current Medications and/or Treatments Impacting Glycemic Control  Immunotherapy:    Immunosuppressants         Stop Route Frequency     tacrolimus capsule 6 mg      -- Oral 2 times daily     mycophenolate capsule 1,000 mg      -- Oral 2 times daily        Steroids:   Hormones (From admission, onward)    Start     Stop Route Frequency Ordered    05/02/19 0900  predniSONE tablet 20 " mg  (methylprednisolone taper panel)      -- Oral Daily 04/27/19 0001    05/01/19 0900  methylPREDNISolone sodium succinate injection 20 mg  (methylprednisolone taper panel)      05/02 0859 IV Every 12 hours 04/27/19 0001        Pressors:    Autonomic Drugs (From admission, onward)    None        Hyperglycemia/Diabetes Medications:   Antihyperglycemics (From admission, onward)    Start     Stop Route Frequency Ordered    04/29/19 1130  insulin aspart U-100 pen 4-6 Units      -- SubQ 3 times daily with meals 04/29/19 0757    04/29/19 0900  insulin regular (Humulin R) 100 Units in sodium chloride 0.9% 100 mL infusion      -- IV Continuous 04/29/19 0757    04/29/19 0857  insulin aspart U-100 pen 0-5 Units      -- SubQ As needed (PRN) 04/29/19 0757

## 2019-05-01 NOTE — PROGRESS NOTES
"[facility]  Endocrinology  Progress Note    Admit Date: 4/26/2019     Reason for Consult: Management of T2DM, Hyperglycemia     Surgical Procedure and Date: Liver Transplant 4/26/19    Diabetes diagnosis year: > 5 years ago  Lab Results   Component Value Date    HGBA1C 6.9 (H) 04/26/2019     Home Diabetes Medications:  Humulin 20 units TID with meals, Lantus 20-25 units q HS    How often checking glucose at home? in the AM/ when he feels bad   BG readings on regimen: 80s-100  Hypoglycemia on the regimen?  No  Missed doses on regimen?  No    Diabetes Complications include:     Hyperglycemia    Complicating diabetes co morbidities:   CIRRHOSIS      HPI:   Patient is a 53 y.o. male with a diagnosis of T2DM, etoh cirrhosis, portal HTN, esophageal varices, and ESLD who presented for liver transplant. Father with DM per chart review.  Endocrinology consulted for management of T2DM/hyperglycemia.                 Interval HPI:   Overnight events: BG below goal overnight and transition IV insulin infusion stopped per protocol. BG elevated above goal this morning and insulin infusion restarted at 1 unit/hr.   Eating:   NPO  Nausea: No  Hypoglycemia and intervention: No  Fever: No  TPN and/or TF: No    /68 (BP Location: Left arm, Patient Position: Lying)   Pulse 66   Temp 97.6 °F (36.4 °C) (Temporal)   Resp 15   Ht 5' 6" (1.676 m)   Wt 88.1 kg (194 lb 3.6 oz)   SpO2 97%   BMI 31.35 kg/m²      Labs Reviewed and Include    Recent Labs   Lab 05/01/19  0914   *   CALCIUM 8.1*   ALBUMIN 2.6*   PROT 5.1*   *   K 4.0   CO2 21*      BUN 21*   CREATININE 0.8   ALKPHOS 104   *   AST 66*   BILITOT 1.4*     Lab Results   Component Value Date    WBC 6.17 05/01/2019    HGB 8.4 (L) 05/01/2019    HCT 23.5 (L) 05/01/2019    MCV 94 05/01/2019    PLT 62 (L) 05/01/2019     No results for input(s): TSH, FREET4 in the last 168 hours.  Lab Results   Component Value Date    HGBA1C 6.9 (H) " 04/26/2019       Nutritional status:   Body mass index is 31.35 kg/m².  Lab Results   Component Value Date    ALBUMIN 2.6 (L) 05/01/2019    ALBUMIN 2.6 (L) 05/01/2019    ALBUMIN 2.6 (L) 04/30/2019     No results found for: PREALBUMIN    Estimated Creatinine Clearance: 109.7 mL/min (based on SCr of 0.8 mg/dL).    Accu-Checks  Recent Labs     04/29/19  1817 04/29/19  2116 04/30/19  0204 04/30/19  0731 04/30/19  1129 04/30/19  1652 04/30/19  2122 04/30/19  2204 05/01/19  0213 05/01/19  0741   POCTGLUCOSE 190* 141* 108 137* 149* 151* 99 108 215* 223*       Current Medications and/or Treatments Impacting Glycemic Control  Immunotherapy:    Immunosuppressants         Stop Route Frequency     tacrolimus capsule 6 mg      -- Oral 2 times daily     mycophenolate capsule 1,000 mg      -- Oral 2 times daily        Steroids:   Hormones (From admission, onward)    Start     Stop Route Frequency Ordered    05/02/19 0900  predniSONE tablet 20 mg  (methylprednisolone taper panel)      -- Oral Daily 04/27/19 0001    05/01/19 0900  methylPREDNISolone sodium succinate injection 20 mg  (methylprednisolone taper panel)      05/02 0859 IV Every 12 hours 04/27/19 0001        Pressors:    Autonomic Drugs (From admission, onward)    None        Hyperglycemia/Diabetes Medications:   Antihyperglycemics (From admission, onward)    Start     Stop Route Frequency Ordered    04/29/19 1130  insulin aspart U-100 pen 4-6 Units      -- SubQ 3 times daily with meals 04/29/19 0757    04/29/19 0900  insulin regular (Humulin R) 100 Units in sodium chloride 0.9% 100 mL infusion      -- IV Continuous 04/29/19 0757    04/29/19 0857  insulin aspart U-100 pen 0-5 Units      -- SubQ As needed (PRN) 04/29/19 0757          ASSESSMENT and PLAN    * Liver replaced by transplant  Post op day 6  Managed per primary team  Avoid hypoglycemia        Type 2 diabetes mellitus with hyperglycemia  BG goal 140 - 180     Plan:  Restart transition IV insulin infusion at 1  units/hr with stepdown parameters  Novolog 4-6 units TID with meals (weight based using 0.5 modifier) - hold if NPO  Low dose correction scale    ADDENDUM:   Stop transition IV insulin infusion at 2100 tonight  Start Levemir 16 units q HS (reduced home basal by 20%)  Continue Novolog 4-6 units with meals  Low dose correction scale  BG monitoring AC/HS    ** Please call Endocrine for any BG related issues   ** Please notify Endocrine for any change and/or advance in diet**    Discharge planning: TBD        Prophylactic immunotherapy  May increase insulin requirements      Adverse effect of corticosteroids  Glucocorticoids markedly increase glucoses.   May increase insulin requirements.        Class 1 obesity due to excess calories in adult  May increase insulin resistance.             Anat Leyva NP  Endocrinology  [facility]

## 2019-05-01 NOTE — NURSING TRANSFER
Nursing Transfer Note      5/1/2019     Transfer To: [id number]    Transfer via stretcher    Transfer with brittni    Transported by pct    Medicines sent: no    Chart send with patient: Yes    Notified: spouse

## 2019-05-01 NOTE — PHYSICIAN QUERY
"PT Name: Cesario Bailey  MR #: [id number]     Physician Query Form - Diagnosis Clarification      CDS/: Annamaria Bahena               Contact information: [email or web address]     This form is a permanent document in the medical record.     Query Date: May 1, 2019    By submitting this query, we are merely seeking further clarification of documentation.  Please utilize your independent clinical judgment when addressing the question(s) below.     The medical record contains the following:      Findings Supporting Clinical Information Location in Medical Record   protein calorie malnutrition         Height: 5' 6" (167.6 cm)  Weight: 87.2 kg (192 lb 3.9 oz)  BMI (Calculated): 27.8    % Intake of Estimated Energy Needs: Other: 100% clears this AM  % Meal Intake: Other: 100%          Liver Transplant PN 4/29    Nutrition Consult 4/28        Nutrition Consult 4/28     Please clarify if the _protein calorie malnutrition____ diagnosis has been:    [X] Ruled In, mild malnutrition   [  ] Ruled In, Now Resolved   [  ] Ruled Out   [  ] Other/Clarification of findings (please specify):   [  ] Clinically insignificant     [  ] Clinically undetermined     Please document in your progress notes daily for the duration of treatment, until resolved, and include in your discharge summary.                                                                                "

## 2019-05-01 NOTE — ASSESSMENT & PLAN NOTE
BG goal 140 - 180     Plan:  Restart transition IV insulin infusion at 1 units/hr with stepdown parameters  Novolog 4-6 units TID with meals (weight based using 0.5 modifier) - hold if NPO  Low dose correction scale    ADDENDUM:   Stop transition IV insulin infusion at 2100 tonight  Start Levemir 16 units q HS (reduced home basal by 20%)  Continue Novolog 4-6 units with meals  Low dose correction scale  BG monitoring AC/HS    ** Please call Endocrine for any BG related issues   ** Please notify Endocrine for any change and/or advance in diet**    Discharge planning: TBD

## 2019-05-01 NOTE — PROGRESS NOTES
GENI met with patient and pts wife today at bedside with  Janee via phone.   Pt presents just returned from OR where he had drain removed this am.   Pt presents A&ox4 with good eye contact and engaging.  Pts wife Rd Rogers engaged as well.   SW discussed dc planning needs with patient and patients wife today, as team feels he will be ready for dc from hospital tomorrow.   Pts wife will be able to check into [facility] transplant [id number] tomorrow at 11:30 am.  Pts wife verbalized understanding of information provided to her today and that this is an ADA apt with smaller beds in the bedrooms.   Pt and pts wife are fine with this arrangement.   Pts wife aware that the manager at [facility] speaks [place], so no  needed for check in tomorrow.    Pt with no other psychosocial needs identified for dc tomorrow.  Pt with no coping issues identified at this time.   GENI remains available for all transplant resources, education, psychosocial support and assist with all dc planning needs.

## 2019-05-01 NOTE — OP NOTE
preop dx : retained drain  Post op dx: same  Procedure: ex lap and removal of drain    Procedure: under general anesthesia, patient was prepped and draped in standard fashion. The right side of the transplant incision was opened. Retained drain was removed and handed off for pathology.  Fascia  Closed with #[id number]  Skin closed with staples  Sterile dressing applied.    Needle, sponge count and instrument count was correct    Specimen : drain

## 2019-05-01 NOTE — PT/OT/SLP PROGRESS
Occupational Therapy      Patient Name:  Cesario Bailey   MRN:  [id number]    Patient not seen today secondary to pt off floor in AM for exp laparotomy. New orders to be obtained to resume OT services. Will follow-up when medically appropriate.     Debra Glynn, OT  5/1/2019

## 2019-05-01 NOTE — PLAN OF CARE
Problem: Adult Inpatient Plan of Care  Goal: Plan of Care Review  Recommendations     Recommendation/Intervention: 1.) Advance diet as tolerated to diabetic. 2.) Pt to receive additional diet instruction at follow up   Goals: Pt to consume/tolerate >75% EEN and EPN.   Nutrition Goal Status: progressing towards goal  Communication of RD Recs: (POC)       Assessment and Plan   Nutrition Problem  Increased energy needs     Related to (etiology):   healing     Signs and Symptoms (as evidenced by):   Post-liver tx      Interventions/Recommendations (treatment strategy):  Collaboration of care     Nutrition Diagnosis Status:   Continues

## 2019-05-01 NOTE — PLAN OF CARE
Problem: Adult Inpatient Plan of Care  Goal: Plan of Care Review  Outcome: Ongoing (interventions implemented as appropriate)  Pt AAOx4, VSS, afebrile.  Pt NPO at MN. OR in am for JANICE removal. Blood sugar 99 HS, Insulin gtt turned off per endocrine MD.  Pt in lowest bed position setting, lighting adjusted, pt to wear nonskid socks when ambulating, side rails up x2.  Pt remain free from falls during shift.  Pt verbalize understanding to call when needed assistance. Call light within reach.  Will continue to monitor.

## 2019-05-01 NOTE — PT/OT/SLP DISCHARGE
Occupational Therapy Discharge Summary    Cesario Bailey  MRN: [id number]   Principal Problem: Liver replaced by transplant      Patient Discharged from acute Occupational Therapy on 5/1/19.  Please refer to prior OT note dated 4/29/19 for functional status.    Assessment:      Goals partially met. Surgical procedure on 5/1/19    Objective:     GOALS:   Multidisciplinary Problems     Occupational Therapy Goals        Problem: Occupational Therapy Goal    Goal Priority Disciplines Outcome Interventions   Occupational Therapy Goal     OT, PT/OT Ongoing (interventions implemented as appropriate)    Description:  Goals to be met by: 5/10/2019     Patient will increase functional independence with ADLs by performing:    UE Dressing with Supervision.  LE Dressing with Supervision.  Grooming while standing at sink with Supervision.  Toileting from toilet with Supervision for hygiene and clothing management.   Toilet transfer to toilet with Supervision.                      Reasons for Discontinuation of Therapy Services  Exp laparatomy performed on 5/1/19.      Plan:     Patient Discharged to: Back to the floor in room [address]342      Debra Glynn, OT  5/1/2019

## 2019-05-02 DIAGNOSIS — Z94.4 LIVER REPLACED BY TRANSPLANT: Primary | ICD-10-CM

## 2019-05-02 LAB
ALBUMIN SERPL BCP-MCNC: 2.5 G/DL (ref 3.5–5.2)
ALP SERPL-CCNC: 103 U/L (ref 55–135)
ALT SERPL W/O P-5'-P-CCNC: 251 U/L (ref 10–44)
ANION GAP SERPL CALC-SCNC: 7 MMOL/L (ref 8–16)
AST SERPL-CCNC: 40 U/L (ref 10–40)
BASOPHILS # BLD AUTO: 0.01 K/UL (ref 0–0.2)
BASOPHILS NFR BLD: 0.2 % (ref 0–1.9)
BILIRUB SERPL-MCNC: 1.3 MG/DL (ref 0.1–1)
BUN SERPL-MCNC: 17 MG/DL (ref 6–20)
CALCIUM SERPL-MCNC: 8.1 MG/DL (ref 8.7–10.5)
CHLORIDE SERPL-SCNC: 103 MMOL/L (ref 95–110)
CO2 SERPL-SCNC: 23 MMOL/L (ref 23–29)
CREAT SERPL-MCNC: 0.9 MG/DL (ref 0.5–1.4)
DIFFERENTIAL METHOD: ABNORMAL
EOSINOPHIL # BLD AUTO: 0.5 K/UL (ref 0–0.5)
EOSINOPHIL NFR BLD: 9.1 % (ref 0–8)
ERYTHROCYTE [DISTWIDTH] IN BLOOD BY AUTOMATED COUNT: 14.6 % (ref 11.5–14.5)
EST. GFR  (AFRICAN AMERICAN): >60 ML/MIN/1.73 M^2
EST. GFR  (NON AFRICAN AMERICAN): >60 ML/MIN/1.73 M^2
GLUCOSE SERPL-MCNC: 192 MG/DL (ref 70–110)
HCT VFR BLD AUTO: 22.7 % (ref 40–54)
HGB BLD-MCNC: 7.8 G/DL (ref 14–18)
IMM GRANULOCYTES # BLD AUTO: 0.05 K/UL (ref 0–0.04)
IMM GRANULOCYTES NFR BLD AUTO: 0.9 % (ref 0–0.5)
LYMPHOCYTES # BLD AUTO: 0.4 K/UL (ref 1–4.8)
LYMPHOCYTES NFR BLD: 7 % (ref 18–48)
MAGNESIUM SERPL-MCNC: 1.6 MG/DL (ref 1.6–2.6)
MCH RBC QN AUTO: 32.2 PG (ref 27–31)
MCHC RBC AUTO-ENTMCNC: 34.4 G/DL (ref 32–36)
MCV RBC AUTO: 94 FL (ref 82–98)
MONOCYTES # BLD AUTO: 0.4 K/UL (ref 0.3–1)
MONOCYTES NFR BLD: 7.1 % (ref 4–15)
NEUTROPHILS # BLD AUTO: 4.3 K/UL (ref 1.8–7.7)
NEUTROPHILS NFR BLD: 75.7 % (ref 38–73)
NRBC BLD-RTO: 0 /100 WBC
PHOSPHATE SERPL-MCNC: 3.4 MG/DL (ref 2.7–4.5)
PLATELET # BLD AUTO: 68 K/UL (ref 150–350)
PMV BLD AUTO: 11.9 FL (ref 9.2–12.9)
POCT GLUCOSE: 184 MG/DL (ref 70–110)
POCT GLUCOSE: 235 MG/DL (ref 70–110)
POCT GLUCOSE: 251 MG/DL (ref 70–110)
POCT GLUCOSE: 278 MG/DL (ref 70–110)
POCT GLUCOSE: 354 MG/DL (ref 70–110)
POTASSIUM SERPL-SCNC: 4 MMOL/L (ref 3.5–5.1)
PROT SERPL-MCNC: 5 G/DL (ref 6–8.4)
RBC # BLD AUTO: 2.42 M/UL (ref 4.6–6.2)
SODIUM SERPL-SCNC: 133 MMOL/L (ref 136–145)
TACROLIMUS BLD-MCNC: 3.6 NG/ML (ref 5–15)
WBC # BLD AUTO: 5.61 K/UL (ref 3.9–12.7)

## 2019-05-02 PROCEDURE — 20600001 HC STEP DOWN PRIVATE ROOM

## 2019-05-02 PROCEDURE — 84100 ASSAY OF PHOSPHORUS: CPT

## 2019-05-02 PROCEDURE — 63600175 PHARM REV CODE 636 W HCPCS: Performed by: STUDENT IN AN ORGANIZED HEALTH CARE EDUCATION/TRAINING PROGRAM

## 2019-05-02 PROCEDURE — 83735 ASSAY OF MAGNESIUM: CPT

## 2019-05-02 PROCEDURE — 80053 COMPREHEN METABOLIC PANEL: CPT

## 2019-05-02 PROCEDURE — 25000003 PHARM REV CODE 250: Performed by: PHYSICIAN ASSISTANT

## 2019-05-02 PROCEDURE — 25000003 PHARM REV CODE 250: Performed by: NURSE PRACTITIONER

## 2019-05-02 PROCEDURE — 25000003 PHARM REV CODE 250: Performed by: STUDENT IN AN ORGANIZED HEALTH CARE EDUCATION/TRAINING PROGRAM

## 2019-05-02 PROCEDURE — 85025 COMPLETE CBC W/AUTO DIFF WBC: CPT

## 2019-05-02 PROCEDURE — 80197 ASSAY OF TACROLIMUS: CPT

## 2019-05-02 PROCEDURE — 25000003 PHARM REV CODE 250: Performed by: TRANSPLANT SURGERY

## 2019-05-02 PROCEDURE — 99233 SBSQ HOSP IP/OBS HIGH 50: CPT | Mod: 24,,, | Performed by: PHYSICIAN ASSISTANT

## 2019-05-02 PROCEDURE — 99233 PR SUBSEQUENT HOSPITAL CARE,LEVL III: ICD-10-PCS | Mod: 24,,, | Performed by: PHYSICIAN ASSISTANT

## 2019-05-02 PROCEDURE — 99232 SBSQ HOSP IP/OBS MODERATE 35: CPT | Mod: ,,, | Performed by: NURSE PRACTITIONER

## 2019-05-02 PROCEDURE — 99232 PR SUBSEQUENT HOSPITAL CARE,LEVL II: ICD-10-PCS | Mod: ,,, | Performed by: NURSE PRACTITIONER

## 2019-05-02 PROCEDURE — 63600175 PHARM REV CODE 636 W HCPCS: Performed by: NURSE PRACTITIONER

## 2019-05-02 PROCEDURE — 63600175 PHARM REV CODE 636 W HCPCS: Performed by: TRANSPLANT SURGERY

## 2019-05-02 RX ORDER — DOCUSATE SODIUM 100 MG/1
100 CAPSULE, LIQUID FILLED ORAL
Status: DISCONTINUED | OUTPATIENT
Start: 2019-05-02 | End: 2019-05-04 | Stop reason: HOSPADM

## 2019-05-02 RX ORDER — BISACODYL 10 MG
10 SUPPOSITORY, RECTAL RECTAL ONCE
Status: COMPLETED | OUTPATIENT
Start: 2019-05-02 | End: 2019-05-02

## 2019-05-02 RX ORDER — INSULIN ASPART 100 [IU]/ML
8 INJECTION, SOLUTION INTRAVENOUS; SUBCUTANEOUS
Status: DISCONTINUED | OUTPATIENT
Start: 2019-05-03 | End: 2019-05-03

## 2019-05-02 RX ADMIN — INSULIN ASPART 4 UNITS: 100 INJECTION, SOLUTION INTRAVENOUS; SUBCUTANEOUS at 12:05

## 2019-05-02 RX ADMIN — SULFAMETHOXAZOLE AND TRIMETHOPRIM 1 TABLET: 400; 80 TABLET ORAL at 08:05

## 2019-05-02 RX ADMIN — OXYCODONE HYDROCHLORIDE 10 MG: 10 TABLET ORAL at 04:05

## 2019-05-02 RX ADMIN — MYCOPHENOLATE MOFETIL 1000 MG: 250 CAPSULE ORAL at 08:05

## 2019-05-02 RX ADMIN — INSULIN DETEMIR 16 UNITS: 100 INJECTION, SOLUTION SUBCUTANEOUS at 08:05

## 2019-05-02 RX ADMIN — HEPARIN SODIUM 5000 UNITS: 5000 INJECTION, SOLUTION INTRAVENOUS; SUBCUTANEOUS at 08:05

## 2019-05-02 RX ADMIN — OXYCODONE HYDROCHLORIDE 10 MG: 10 TABLET ORAL at 08:05

## 2019-05-02 RX ADMIN — INSULIN ASPART 2 UNITS: 100 INJECTION, SOLUTION INTRAVENOUS; SUBCUTANEOUS at 02:05

## 2019-05-02 RX ADMIN — INSULIN ASPART 6 UNITS: 100 INJECTION, SOLUTION INTRAVENOUS; SUBCUTANEOUS at 06:05

## 2019-05-02 RX ADMIN — FUROSEMIDE 40 MG: 40 TABLET ORAL at 08:05

## 2019-05-02 RX ADMIN — DOCUSATE SODIUM 100 MG: 100 CAPSULE, LIQUID FILLED ORAL at 02:05

## 2019-05-02 RX ADMIN — INSULIN ASPART 3 UNITS: 100 INJECTION, SOLUTION INTRAVENOUS; SUBCUTANEOUS at 12:05

## 2019-05-02 RX ADMIN — HEPARIN SODIUM 5000 UNITS: 5000 INJECTION, SOLUTION INTRAVENOUS; SUBCUTANEOUS at 02:05

## 2019-05-02 RX ADMIN — DOCUSATE SODIUM 100 MG: 100 CAPSULE, LIQUID FILLED ORAL at 10:05

## 2019-05-02 RX ADMIN — Medication 10 MG: at 10:05

## 2019-05-02 RX ADMIN — SIMETHICONE CHEW TAB 80 MG 80 MG: 80 TABLET ORAL at 08:05

## 2019-05-02 RX ADMIN — OXYCODONE HYDROCHLORIDE 10 MG: 10 TABLET ORAL at 10:05

## 2019-05-02 RX ADMIN — NYSTATIN 500000 UNITS: 500000 SUSPENSION ORAL at 08:05

## 2019-05-02 RX ADMIN — FAMOTIDINE 20 MG: 20 TABLET, FILM COATED ORAL at 08:05

## 2019-05-02 RX ADMIN — INSULIN ASPART 5 UNITS: 100 INJECTION, SOLUTION INTRAVENOUS; SUBCUTANEOUS at 06:05

## 2019-05-02 RX ADMIN — NYSTATIN 500000 UNITS: 500000 SUSPENSION ORAL at 02:05

## 2019-05-02 RX ADMIN — TACROLIMUS 8 MG: 1 CAPSULE ORAL at 08:05

## 2019-05-02 RX ADMIN — INSULIN ASPART 3 UNITS: 100 INJECTION, SOLUTION INTRAVENOUS; SUBCUTANEOUS at 09:05

## 2019-05-02 RX ADMIN — TACROLIMUS 8 MG: 1 CAPSULE ORAL at 05:05

## 2019-05-02 RX ADMIN — DOCUSATE SODIUM 100 MG: 100 CAPSULE, LIQUID FILLED ORAL at 08:05

## 2019-05-02 RX ADMIN — PREDNISONE 20 MG: 10 TABLET ORAL at 08:05

## 2019-05-02 RX ADMIN — HEPARIN SODIUM 5000 UNITS: 5000 INJECTION, SOLUTION INTRAVENOUS; SUBCUTANEOUS at 05:05

## 2019-05-02 RX ADMIN — NYSTATIN 500000 UNITS: 500000 SUSPENSION ORAL at 10:05

## 2019-05-02 RX ADMIN — KETOCONAZOLE 100 MG: 200 TABLET ORAL at 04:05

## 2019-05-02 NOTE — PROGRESS NOTES
[facility]  Liver Transplant  Progress Note    Patient Name: Cesario Bailey  MRN: [id number]  Admission Date: 2019  Hospital Length of Stay: 6 days  Code Status: Full Code  Primary Care Provider: Primary Doctor No  Post-Operative Day: 6    ORGAN:   LIVER  Disease Etiology: Primary Liver Malignancy: Hepatoma (HCC) and Cirrhosis  Donor Type:    - Brain Death  CDC High Risk:   No  Donor CMV Status:   Donor CMV Status: Negative  Donor HBcAB:   Negative  Donor HCV Status:   Negative  Donor HBV MARIA R: Negative  Donor HCV MARIA R: Negative  Whole or Partial: Whole Liver  Biliary Anastomosis: End to End  Arterial Anatomy: Standard  Subjective:     History of Present Illness:  Mr. Bailey is a 54 y/o M with ESLD 2/2 ETOH and HCC s/p tace x 2. He was admitted for liver transplant.     Hospital Course:  He is now s/p DBD  liver transplant on 2019. This patient will follow the Steroid Induction protocol.  This patients immunosuppression will include a steroid taper over 6 weeks, Cellcept for 3 months and Prograf maintenance.  Opportunistic infection prophylaxis will include acyclovir for 3 months (CMV D- R-), Bactrim for 6 months, and nystatin. Surgery was without complication. He was transferred to TSU on POD#3.     Interval History: No acute events overnight. Patient with increased pain to RUQ, along area that was opened up yesterday in OR for drain removal. Liver US ordered to further assess. LFTs cont to improve. Continue to monitor.     Scheduled Meds:   [START ON 2019] acyclovir  400 mg Oral BID    docusate sodium  100 mg Oral TID PC    ergocalciferol  50,000 Units Oral Q7 Days    famotidine  20 mg Oral QHS    furosemide  40 mg Oral Daily    heparin (porcine)  5,000 Units Subcutaneous Q8H    insulin aspart U-100  4-6 Units Subcutaneous TIDWM    insulin detemir U-100  16 Units Subcutaneous QHS    ketoconazole  100 mg Oral Daily    mycophenolate  1,000 mg Oral BID     nystatin  500,000 Units Mouth/Throat TID PC    predniSONE  20 mg Oral Daily    sulfamethoxazole-trimethoprim 400-80mg  1 tablet Oral Daily    tacrolimus  8 mg Oral BID     Continuous Infusions:    PRN Meds:sodium chloride, dextrose 50%, dextrose 50%, glucagon (human recombinant), glucose, glucose, hydrALAZINE, HYDROcodone-acetaminophen, insulin aspart U-100, oxyCODONE, oxyCODONE, simethicone    Review of Systems   Constitutional: Positive for activity change and appetite change. Negative for chills and fever.   Respiratory: Negative for shortness of breath and wheezing.    Cardiovascular: Positive for leg swelling.   Gastrointestinal: Positive for abdominal distention and abdominal pain. Negative for nausea and vomiting.   Genitourinary: Negative for decreased urine volume, difficulty urinating, dysuria and hematuria.   Skin: Positive for wound.   Allergic/Immunologic: Positive for immunocompromised state.   Neurological: Negative for dizziness and weakness.   Psychiatric/Behavioral: Negative for agitation and decreased concentration.     Objective:     Vital Signs (Most Recent):  Temp: 98.4 °F (36.9 °C) (05/02/19 1226)  Pulse: 82 (05/02/19 1226)  Resp: 15 (05/02/19 1226)  BP: (!) 144/73 (05/02/19 1226)  SpO2: 97 % (05/02/19 1226) Vital Signs (24h Range):  Temp:  [97.6 °F (36.4 °C)-98.4 °F (36.9 °C)] 98.4 °F (36.9 °C)  Pulse:  [68-83] 82  Resp:  [12-19] 15  SpO2:  [94 %-100 %] 97 %  BP: (116-158)/(71-94) 144/73     Weight: 88.6 kg (195 lb 5.2 oz)  Body mass index is 31.53 kg/m².    Intake/Output - Last 3 Shifts       04/30 0700 - 05/01 0659 05/01 0700 - 05/02 0659 05/02 0700 - 05/03 0659    P.O. 1460 180     I.V. (mL/kg) 90.7 (1) 721.2 (8.1)     Blood       Total Intake(mL/kg) 1550.7 (17.6) 901.2 (10.2)     Urine (mL/kg/hr) 0 (0)      Emesis/NG output 0      Drains 90      Other 0      Stool 0      Blood 0      Total Output 90      Net +1460.7 +901.2            Urine Occurrence 10 x 2 x     Stool Occurrence 3 x 1  x 0 x    Emesis Occurrence 0 x            Physical Exam   Constitutional: He is oriented to person, place, and time. No distress.   Cardiovascular: Normal rate and regular rhythm.   No murmur heard.  Pulmonary/Chest: Effort normal and breath sounds normal. He has no wheezes. He has no rales.   Abdominal: Soft. He exhibits distension. There is no tenderness. There is no rebound and no guarding.       Neurological: He is alert and oriented to person, place, and time.   Skin: He is not diaphoretic.   Psychiatric: He has a normal mood and affect. His behavior is normal. Judgment and thought content normal.   Nursing note and vitals reviewed.      Laboratory:  Immunosuppressants         Stop Route Frequency     tacrolimus capsule 8 mg      -- Oral 2 times daily     mycophenolate capsule 1,000 mg      -- Oral 2 times daily        CBC:   Recent Labs   Lab 05/02/19  0600   WBC 5.61   RBC 2.42*   HGB 7.8*   HCT 22.7*   PLT 68*   MCV 94   MCH 32.2*   MCHC 34.4     CMP:   Recent Labs   Lab 05/02/19  0600   *   CALCIUM 8.1*   ALBUMIN 2.5*   PROT 5.0*   *   K 4.0   CO2 23      BUN 17   CREATININE 0.9   ALKPHOS 103   *   AST 40   BILITOT 1.3*     Tacrolimus Lvl   Date Value Ref Range Status   05/02/2019 3.6 (L) 5.0 - 15.0 ng/mL Final     Comment:     Testing performed by Liquid Chromatography-Tandem  Mass Spectrometry (LC-MS/MS).  This test was developed and its performance characteristics  determined by [facility], Department of Pathology  and Laboratory Medicine in a manner consistent with CLIA  requirements. It has not been cleared or approved by the [place]  Food and Drug Administration.  This test is used for clinical   purposes.  It should not be regarded as investigational or for  research.     05/01/2019 2.1 (L) 5.0 - 15.0 ng/mL Final     Comment:     Testing performed by Liquid Chromatography-Tandem  Mass Spectrometry (LC-MS/MS).  This test was developed and its performance  characteristics  determined by [facility], Department of Pathology  and Laboratory Medicine in a manner consistent with CLIA  requirements. It has not been cleared or approved by the [place]  Food and Drug Administration.  This test is used for clinical   purposes.  It should not be regarded as investigational or for  research.     04/30/2019 2.8 (L) 5.0 - 15.0 ng/mL Final     Comment:     Testing performed by Liquid Chromatography-Tandem  Mass Spectrometry (LC-MS/MS).  This test was developed and its performance characteristics  determined by [facility], Department of Pathology  and Laboratory Medicine in a manner consistent with CLIA  requirements. It has not been cleared or approved by the [place]  Food and Drug Administration.  This test is used for clinical   purposes.  It should not be regarded as investigational or for  research.     04/29/2019 <1.5 (L) 5.0 - 15.0 ng/mL Final     Comment:     Testing performed by Liquid Chromatography-Tandem  Mass Spectrometry (LC-MS/MS).  This test was developed and its performance characteristics  determined by [facility], Department of Pathology  and Laboratory Medicine in a manner consistent with CLIA  requirements. It has not been cleared or approved by the [place]  Food and Drug Administration.  This test is used for clinical   purposes.  It should not be regarded as investigational or for  research.         Labs within the past 24 hours have been reviewed.    Diagnostic Results:  None    Assessment/Plan:     * Liver replaced by transplant  - LFTs improving daily  - Tolerating diet  - Passing flatus, +BM  - Medial JANICE drain with significant resistance during attempted removal POD #4, removed in OR on 5/1 (POD#5)  - With increased pain along RUQ, will plan for liver US to further assess  - Continue to monitor.         Anemia of chronic disease  - Due to hx of ESLD  - See acute blood loss anemia.   - Monitor with daily cbc.       Acidosis  - Improving.  Continue to monitor with daily labs.       Hypomagnesemia  - Improved. Monitor with daily labs.       Acute blood loss anemia  - Expected post operatively  - H/H stable.  Cont to monitor.      Type 2 diabetes mellitus with hyperglycemia  - Endocrine following for help with management. Appreciate their assistance.         Prophylactic immunotherapy  - See long term use of immunosuppressant medication.       Long-term use of immunosuppressant medication  - Continue prograf, cellcept, steroids. Continue to monitor prograf level daily, monitor for toxic side effects, and adjust for therapeutic dose.       Thrombocytopenia due to hypersplenism  - Likely to continue to improve post liver transplant.           Debility/Functional status: Patient debilitated by evidence of Weakness. Physical and occupational therapy ordered daily to evaluate and treat. Debility was: present on admission.    VTE Risk Mitigation (From admission, onward)        Ordered     heparin (porcine) injection 5,000 Units  Every 8 hours      04/27/19 0001     Place sequential compression device  Until discontinued      04/27/19 0001     IP VTE HIGH RISK PATIENT  Once      04/27/19 0001          The patients clinical status was discussed at multidisplinary rounds, involving transplant surgery, transplant medicine, pharmacy, nursing, nutrition, and social work    Discharge Planning: Not a candidate for discharge at this time. Likely discharge tomorrow.   No Patient Care Coordination Note on file.      Toma Scott PA-C  Liver Transplant  [facility]

## 2019-05-02 NOTE — SUBJECTIVE & OBJECTIVE
"Interval HPI:   Overnight events:  BG above goal. Patient endorsed eating hamburger today between meals. Possible discharge scheduled for Sat according to patient.     Eatin%  Nausea: No  Hypoglycemia and intervention: No  Fever: No  TPN and/or TF: No  If yes, type of TF/TPN and rate: None    BP (!) 150/89 (BP Location: Left arm, Patient Position: Sitting)   Pulse 78   Temp 98.4 °F (36.9 °C) (Oral)   Resp (!) 24   Ht 5' 6" (1.676 m)   Wt 88.6 kg (195 lb 5.2 oz)   SpO2 100%   BMI 31.53 kg/m²     Labs Reviewed and Include    Recent Labs   Lab 19  0600   *   CALCIUM 8.1*   ALBUMIN 2.5*   PROT 5.0*   *   K 4.0   CO2 23      BUN 17   CREATININE 0.9   ALKPHOS 103   *   AST 40   BILITOT 1.3*     Lab Results   Component Value Date    WBC 5.61 2019    HGB 7.8 (L) 2019    HCT 22.7 (L) 2019    MCV 94 2019    PLT 68 (L) 2019     No results for input(s): TSH, FREET4 in the last 168 hours.  Lab Results   Component Value Date    HGBA1C 6.9 (H) 2019       Nutritional status:   Body mass index is 31.53 kg/m².  Lab Results   Component Value Date    ALBUMIN 2.5 (L) 2019    ALBUMIN 2.6 (L) 2019    ALBUMIN 2.6 (L) 2019     No results found for: PREALBUMIN    Estimated Creatinine Clearance: 97.8 mL/min (based on SCr of 0.9 mg/dL).    Accu-Checks  Recent Labs     19  2122 19  2204 19  0213 19  0741 19  1108 19  1648 19  2044 19  0201 19  0809 19  1145   POCTGLUCOSE 99 108 215* 223* 191* 297* 213* 235* 184* 251*       Current Medications and/or Treatments Impacting Glycemic Control  Immunotherapy:    Immunosuppressants         Stop Route Frequency     tacrolimus capsule 8 mg      -- Oral 2 times daily     mycophenolate capsule 1,000 mg      -- Oral 2 times daily        Steroids:   Hormones (From admission, onward)    Start     Stop Route Frequency Ordered    19 0900  " predniSONE tablet 20 mg  (methylprednisolone taper panel)      -- Oral Daily 04/27/19 0001        Pressors:    Autonomic Drugs (From admission, onward)    None        Hyperglycemia/Diabetes Medications:   Antihyperglycemics (From admission, onward)    Start     Stop Route Frequency Ordered    05/01/19 2100  insulin detemir U-100 pen 16 Units      -- SubQ Nightly 05/01/19 1606    04/29/19 1130  insulin aspart U-100 pen 4-6 Units      -- SubQ 3 times daily with meals 04/29/19 0757    04/29/19 0900  insulin regular (Humulin R) 100 Units in sodium chloride 0.9% 100 mL infusion      05/01 2100 IV Continuous 04/29/19 0757    04/29/19 0857  insulin aspart U-100 pen 0-5 Units      -- SubQ As needed (PRN) 04/29/19 0757

## 2019-05-02 NOTE — ASSESSMENT & PLAN NOTE
- LFTs improving daily  - Tolerating diet  - Passing flatus, +BM  - Medial JANICE drain with significant resistance during attempted removal POD #4, removed in OR on 5/1 (POD#5)  - With increased pain along RUQ, will plan for liver US to further assess  - Continue to monitor.

## 2019-05-02 NOTE — ASSESSMENT & PLAN NOTE
BG goal 140 - 180     ADDENDUM:   Start Levemir 16 units q HS (reduced home basal by 20%)  Increase Novolog to 8 units with meals. 20% increase. Patient is now eating 100% of meals, and has shown prandial excursions. Patient also remains on steroid therapy.   Low dose correction scale  BG monitoring AC/HS    ** Please call Endocrine for any BG related issues   ** Please notify Endocrine for any change and/or advance in diet**    Discharge planning:   MDI: TBD  BG logs and follow-up with Endo discharge clinic.

## 2019-05-02 NOTE — SUBJECTIVE & OBJECTIVE
Scheduled Meds:   [START ON 5/6/2019] acyclovir  400 mg Oral BID    docusate sodium  100 mg Oral TID PC    ergocalciferol  50,000 Units Oral Q7 Days    famotidine  20 mg Oral QHS    furosemide  40 mg Oral Daily    heparin (porcine)  5,000 Units Subcutaneous Q8H    insulin aspart U-100  4-6 Units Subcutaneous TIDWM    insulin detemir U-100  16 Units Subcutaneous QHS    ketoconazole  100 mg Oral Daily    mycophenolate  1,000 mg Oral BID    nystatin  500,000 Units Mouth/Throat TID PC    predniSONE  20 mg Oral Daily    sulfamethoxazole-trimethoprim 400-80mg  1 tablet Oral Daily    tacrolimus  8 mg Oral BID     Continuous Infusions:    PRN Meds:sodium chloride, dextrose 50%, dextrose 50%, glucagon (human recombinant), glucose, glucose, hydrALAZINE, HYDROcodone-acetaminophen, insulin aspart U-100, oxyCODONE, oxyCODONE, simethicone    Review of Systems   Constitutional: Positive for activity change and appetite change. Negative for chills and fever.   Respiratory: Negative for shortness of breath and wheezing.    Cardiovascular: Positive for leg swelling.   Gastrointestinal: Positive for abdominal distention and abdominal pain. Negative for nausea and vomiting.   Genitourinary: Negative for decreased urine volume, difficulty urinating, dysuria and hematuria.   Skin: Positive for wound.   Allergic/Immunologic: Positive for immunocompromised state.   Neurological: Negative for dizziness and weakness.   Psychiatric/Behavioral: Negative for agitation and decreased concentration.     Objective:     Vital Signs (Most Recent):  Temp: 98.4 °F (36.9 °C) (05/02/19 1226)  Pulse: 82 (05/02/19 1226)  Resp: 15 (05/02/19 1226)  BP: (!) 144/73 (05/02/19 1226)  SpO2: 97 % (05/02/19 1226) Vital Signs (24h Range):  Temp:  [97.6 °F (36.4 °C)-98.4 °F (36.9 °C)] 98.4 °F (36.9 °C)  Pulse:  [68-83] 82  Resp:  [12-19] 15  SpO2:  [94 %-100 %] 97 %  BP: (116-158)/(71-94) 144/73     Weight: 88.6 kg (195 lb 5.2 oz)  Body mass index is  31.53 kg/m².    Intake/Output - Last 3 Shifts       04/30 0700 - 05/01 0659 05/01 0700 - 05/02 0659 05/02 0700 - 05/03 0659    P.O. 1460 180     I.V. (mL/kg) 90.7 (1) 721.2 (8.1)     Blood       Total Intake(mL/kg) 1550.7 (17.6) 901.2 (10.2)     Urine (mL/kg/hr) 0 (0)      Emesis/NG output 0      Drains 90      Other 0      Stool 0      Blood 0      Total Output 90      Net +1460.7 +901.2            Urine Occurrence 10 x 2 x     Stool Occurrence 3 x 1 x 0 x    Emesis Occurrence 0 x            Physical Exam   Constitutional: He is oriented to person, place, and time. No distress.   Cardiovascular: Normal rate and regular rhythm.   No murmur heard.  Pulmonary/Chest: Effort normal and breath sounds normal. He has no wheezes. He has no rales.   Abdominal: Soft. He exhibits distension. There is no tenderness. There is no rebound and no guarding.       Neurological: He is alert and oriented to person, place, and time.   Skin: He is not diaphoretic.   Psychiatric: He has a normal mood and affect. His behavior is normal. Judgment and thought content normal.   Nursing note and vitals reviewed.      Laboratory:  Immunosuppressants         Stop Route Frequency     tacrolimus capsule 8 mg      -- Oral 2 times daily     mycophenolate capsule 1,000 mg      -- Oral 2 times daily        CBC:   Recent Labs   Lab 05/02/19  0600   WBC 5.61   RBC 2.42*   HGB 7.8*   HCT 22.7*   PLT 68*   MCV 94   MCH 32.2*   MCHC 34.4     CMP:   Recent Labs   Lab 05/02/19  0600   *   CALCIUM 8.1*   ALBUMIN 2.5*   PROT 5.0*   *   K 4.0   CO2 23      BUN 17   CREATININE 0.9   ALKPHOS 103   *   AST 40   BILITOT 1.3*     Tacrolimus Lvl   Date Value Ref Range Status   05/02/2019 3.6 (L) 5.0 - 15.0 ng/mL Final     Comment:     Testing performed by Liquid Chromatography-Tandem  Mass Spectrometry (LC-MS/MS).  This test was developed and its performance characteristics  determined by [facility], Department of Pathology  and  Laboratory Medicine in a manner consistent with CLIA  requirements. It has not been cleared or approved by the [place]  Food and Drug Administration.  This test is used for clinical   purposes.  It should not be regarded as investigational or for  research.     05/01/2019 2.1 (L) 5.0 - 15.0 ng/mL Final     Comment:     Testing performed by Liquid Chromatography-Tandem  Mass Spectrometry (LC-MS/MS).  This test was developed and its performance characteristics  determined by [facility], Department of Pathology  and Laboratory Medicine in a manner consistent with CLIA  requirements. It has not been cleared or approved by the [place]  Food and Drug Administration.  This test is used for clinical   purposes.  It should not be regarded as investigational or for  research.     04/30/2019 2.8 (L) 5.0 - 15.0 ng/mL Final     Comment:     Testing performed by Liquid Chromatography-Tandem  Mass Spectrometry (LC-MS/MS).  This test was developed and its performance characteristics  determined by [facility], Department of Pathology  and Laboratory Medicine in a manner consistent with CLIA  requirements. It has not been cleared or approved by the [place]  Food and Drug Administration.  This test is used for clinical   purposes.  It should not be regarded as investigational or for  research.     04/29/2019 <1.5 (L) 5.0 - 15.0 ng/mL Final     Comment:     Testing performed by Liquid Chromatography-Tandem  Mass Spectrometry (LC-MS/MS).  This test was developed and its performance characteristics  determined by [facility], Department of Pathology  and Laboratory Medicine in a manner consistent with CLIA  requirements. It has not been cleared or approved by the [place]  Food and Drug Administration.  This test is used for clinical   purposes.  It should not be regarded as investigational or for  research.         Labs within the past 24 hours have been reviewed.    Diagnostic Results:  None

## 2019-05-02 NOTE — PROGRESS NOTES
"[facility]  Endocrinology  Progress Note    Admit Date: 2019     Reason for Consult: Management of T2DM, Hyperglycemia     Surgical Procedure and Date: Liver Transplant 19    Diabetes diagnosis year: > 5 years ago  Lab Results   Component Value Date    HGBA1C 6.9 (H) 2019     Home Diabetes Medications:  Humulin 20 units TID with meals, Lantus 20-25 units q HS    How often checking glucose at home? in the AM/ when he feels bad   BG readings on regimen: 80s-100  Hypoglycemia on the regimen?  No  Missed doses on regimen?  No    Diabetes Complications include:     Hyperglycemia    Complicating diabetes co morbidities:   CIRRHOSIS      HPI:   Patient is a 53 y.o. male with a diagnosis of T2DM, etoh cirrhosis, portal HTN, esophageal varices, and ESLD who presented for liver transplant. Father with DM per chart review.  Endocrinology consulted for management of T2DM/hyperglycemia.                 Interval HPI:   Overnight events:  BG above goal. Patient endorsed eating hamburger today between meals. Possible discharge scheduled for Sat according to patient.     Eatin%  Nausea: No  Hypoglycemia and intervention: No  Fever: No  TPN and/or TF: No  If yes, type of TF/TPN and rate: None    BP (!) 150/89 (BP Location: Left arm, Patient Position: Sitting)   Pulse 78   Temp 98.4 °F (36.9 °C) (Oral)   Resp (!) 24   Ht 5' 6" (1.676 m)   Wt 88.6 kg (195 lb 5.2 oz)   SpO2 100%   BMI 31.53 kg/m²      Labs Reviewed and Include    Recent Labs   Lab 19  0600   *   CALCIUM 8.1*   ALBUMIN 2.5*   PROT 5.0*   *   K 4.0   CO2 23      BUN 17   CREATININE 0.9   ALKPHOS 103   *   AST 40   BILITOT 1.3*     Lab Results   Component Value Date    WBC 5.61 2019    HGB 7.8 (L) 2019    HCT 22.7 (L) 2019    MCV 94 2019    PLT 68 (L) 2019     No results for input(s): TSH, FREET4 in the last 168 hours.  Lab Results   Component Value Date    " HGBA1C 6.9 (H) 04/26/2019       Nutritional status:   Body mass index is 31.53 kg/m².  Lab Results   Component Value Date    ALBUMIN 2.5 (L) 05/02/2019    ALBUMIN 2.6 (L) 05/01/2019    ALBUMIN 2.6 (L) 05/01/2019     No results found for: PREALBUMIN    Estimated Creatinine Clearance: 97.8 mL/min (based on SCr of 0.9 mg/dL).    Accu-Checks  Recent Labs     04/30/19  2122 04/30/19  2204 05/01/19  0213 05/01/19  0741 05/01/19  1108 05/01/19  1648 05/01/19  2044 05/02/19  0201 05/02/19  0809 05/02/19  1145   POCTGLUCOSE 99 108 215* 223* 191* 297* 213* 235* 184* 251*       Current Medications and/or Treatments Impacting Glycemic Control  Immunotherapy:    Immunosuppressants         Stop Route Frequency     tacrolimus capsule 8 mg      -- Oral 2 times daily     mycophenolate capsule 1,000 mg      -- Oral 2 times daily        Steroids:   Hormones (From admission, onward)    Start     Stop Route Frequency Ordered    05/02/19 0900  predniSONE tablet 20 mg  (methylprednisolone taper panel)      -- Oral Daily 04/27/19 0001        Pressors:    Autonomic Drugs (From admission, onward)    None        Hyperglycemia/Diabetes Medications:   Antihyperglycemics (From admission, onward)    Start     Stop Route Frequency Ordered    05/01/19 2100  insulin detemir U-100 pen 16 Units      -- SubQ Nightly 05/01/19 1606    04/29/19 1130  insulin aspart U-100 pen 4-6 Units      -- SubQ 3 times daily with meals 04/29/19 0757    04/29/19 0900  insulin regular (Humulin R) 100 Units in sodium chloride 0.9% 100 mL infusion      05/01 2100 IV Continuous 04/29/19 0757    04/29/19 0857  insulin aspart U-100 pen 0-5 Units      -- SubQ As needed (PRN) 04/29/19 0757          ASSESSMENT and PLAN    * Liver transplanted  Managed per primary team  Avoid hypoglycemia        Type 2 diabetes mellitus with hyperglycemia  BG goal 140 - 180     ADDENDUM:   Start Levemir 16 units q HS (reduced home basal by 20%)  Increase Novolog to 8 units with meals. 20%  increase. Patient is now eating 100% of meals, and has shown prandial excursions. Patient also remains on steroid therapy.   Low dose correction scale  BG monitoring AC/HS    ** Please call Endocrine for any BG related issues   ** Please notify Endocrine for any change and/or advance in diet**    Discharge planning:   MDI: TBD  BG logs and follow-up with Endo discharge clinic.         Long-term use of immunosuppressant medication  On immunosepresive therapy per transplant team; may elevate BG readings        Prophylactic immunotherapy  May increase insulin requirements      Adverse effect of corticosteroids  Glucocorticoids markedly increase glucoses.   May increase insulin requirements.        Class 1 obesity due to excess calories in adult  May increase insulin resistance.   Body mass index is 31.53 kg/m².            Krunal Sharma, NP  Endocrinology  [facility]

## 2019-05-02 NOTE — PLAN OF CARE
Problem: Adult Inpatient Plan of Care  Goal: Plan of Care Review  Outcome: Ongoing (interventions implemented as appropriate)  No acute events overnight. VSS.  Insulin gtt d/c'd following Levemir administration per order.   Chevron with surgical dressing CDI.   Fall precautions maintained. Bed wheels locked, bed in lowest position, upper SR up x 2, call light in reach, non-skid socks when OOB. Instructed pt to call for assistance as needed.   Tentative d/c today.

## 2019-05-03 DIAGNOSIS — Z94.4 LIVER REPLACED BY TRANSPLANT: Primary | ICD-10-CM

## 2019-05-03 LAB
ALBUMIN SERPL BCP-MCNC: 2.5 G/DL (ref 3.5–5.2)
ALP SERPL-CCNC: 95 U/L (ref 55–135)
ALT SERPL W/O P-5'-P-CCNC: 178 U/L (ref 10–44)
ANION GAP SERPL CALC-SCNC: 5 MMOL/L (ref 8–16)
AST SERPL-CCNC: 30 U/L (ref 10–40)
BASOPHILS # BLD AUTO: 0.02 K/UL (ref 0–0.2)
BASOPHILS NFR BLD: 0.3 % (ref 0–1.9)
BILIRUB SERPL-MCNC: 1.3 MG/DL (ref 0.1–1)
BUN SERPL-MCNC: 13 MG/DL (ref 6–20)
CALCIUM SERPL-MCNC: 8.3 MG/DL (ref 8.7–10.5)
CHLORIDE SERPL-SCNC: 102 MMOL/L (ref 95–110)
CMV IGG SERPL QL IA: REACTIVE
CO2 SERPL-SCNC: 26 MMOL/L (ref 23–29)
CREAT SERPL-MCNC: 0.8 MG/DL (ref 0.5–1.4)
DIFFERENTIAL METHOD: ABNORMAL
EOSINOPHIL # BLD AUTO: 1.3 K/UL (ref 0–0.5)
EOSINOPHIL NFR BLD: 18.7 % (ref 0–8)
ERYTHROCYTE [DISTWIDTH] IN BLOOD BY AUTOMATED COUNT: 15.2 % (ref 11.5–14.5)
EST. GFR  (AFRICAN AMERICAN): >60 ML/MIN/1.73 M^2
EST. GFR  (NON AFRICAN AMERICAN): >60 ML/MIN/1.73 M^2
GLUCOSE SERPL-MCNC: 138 MG/DL (ref 70–110)
HCT VFR BLD AUTO: 23 % (ref 40–54)
HGB BLD-MCNC: 8.1 G/DL (ref 14–18)
IMM GRANULOCYTES # BLD AUTO: 0.14 K/UL (ref 0–0.04)
IMM GRANULOCYTES NFR BLD AUTO: 2.1 % (ref 0–0.5)
LYMPHOCYTES # BLD AUTO: 0.7 K/UL (ref 1–4.8)
LYMPHOCYTES NFR BLD: 9.7 % (ref 18–48)
MAGNESIUM SERPL-MCNC: 1.5 MG/DL (ref 1.6–2.6)
MCH RBC QN AUTO: 32.4 PG (ref 27–31)
MCHC RBC AUTO-ENTMCNC: 35.2 G/DL (ref 32–36)
MCV RBC AUTO: 92 FL (ref 82–98)
MONOCYTES # BLD AUTO: 0.6 K/UL (ref 0.3–1)
MONOCYTES NFR BLD: 8.2 % (ref 4–15)
NEUTROPHILS # BLD AUTO: 4.2 K/UL (ref 1.8–7.7)
NEUTROPHILS NFR BLD: 61 % (ref 38–73)
NRBC BLD-RTO: 0 /100 WBC
PHOSPHATE SERPL-MCNC: 2.4 MG/DL (ref 2.7–4.5)
PLATELET # BLD AUTO: 95 K/UL (ref 150–350)
PMV BLD AUTO: 11.5 FL (ref 9.2–12.9)
POCT GLUCOSE: 138 MG/DL (ref 70–110)
POCT GLUCOSE: 160 MG/DL (ref 70–110)
POCT GLUCOSE: 263 MG/DL (ref 70–110)
POCT GLUCOSE: 267 MG/DL (ref 70–110)
POCT GLUCOSE: 275 MG/DL (ref 70–110)
POTASSIUM SERPL-SCNC: 3.4 MMOL/L (ref 3.5–5.1)
PROT SERPL-MCNC: 5.1 G/DL (ref 6–8.4)
RBC # BLD AUTO: 2.5 M/UL (ref 4.6–6.2)
SODIUM SERPL-SCNC: 133 MMOL/L (ref 136–145)
TACROLIMUS BLD-MCNC: 6.1 NG/ML (ref 5–15)
WBC # BLD AUTO: 6.8 K/UL (ref 3.9–12.7)

## 2019-05-03 PROCEDURE — 25000003 PHARM REV CODE 250: Performed by: TRANSPLANT SURGERY

## 2019-05-03 PROCEDURE — 25000003 PHARM REV CODE 250: Performed by: PHYSICIAN ASSISTANT

## 2019-05-03 PROCEDURE — 63600175 PHARM REV CODE 636 W HCPCS: Performed by: PHYSICIAN ASSISTANT

## 2019-05-03 PROCEDURE — 63600175 PHARM REV CODE 636 W HCPCS: Performed by: NURSE PRACTITIONER

## 2019-05-03 PROCEDURE — 25000003 PHARM REV CODE 250: Performed by: STUDENT IN AN ORGANIZED HEALTH CARE EDUCATION/TRAINING PROGRAM

## 2019-05-03 PROCEDURE — 99233 SBSQ HOSP IP/OBS HIGH 50: CPT | Mod: 24,,, | Performed by: PHYSICIAN ASSISTANT

## 2019-05-03 PROCEDURE — 84100 ASSAY OF PHOSPHORUS: CPT

## 2019-05-03 PROCEDURE — 63600175 PHARM REV CODE 636 W HCPCS: Performed by: TRANSPLANT SURGERY

## 2019-05-03 PROCEDURE — S5571 INSULIN DISPOS PEN 3 ML: HCPCS | Performed by: NURSE PRACTITIONER

## 2019-05-03 PROCEDURE — 63600175 PHARM REV CODE 636 W HCPCS: Performed by: STUDENT IN AN ORGANIZED HEALTH CARE EDUCATION/TRAINING PROGRAM

## 2019-05-03 PROCEDURE — 80197 ASSAY OF TACROLIMUS: CPT

## 2019-05-03 PROCEDURE — 20600001 HC STEP DOWN PRIVATE ROOM

## 2019-05-03 PROCEDURE — 99233 PR SUBSEQUENT HOSPITAL CARE,LEVL III: ICD-10-PCS | Mod: 24,,, | Performed by: PHYSICIAN ASSISTANT

## 2019-05-03 PROCEDURE — 85025 COMPLETE CBC W/AUTO DIFF WBC: CPT

## 2019-05-03 PROCEDURE — 80053 COMPREHEN METABOLIC PANEL: CPT

## 2019-05-03 PROCEDURE — 25000003 PHARM REV CODE 250: Performed by: NURSE PRACTITIONER

## 2019-05-03 PROCEDURE — 83735 ASSAY OF MAGNESIUM: CPT

## 2019-05-03 RX ORDER — INSULIN ASPART 100 [IU]/ML
1-10 INJECTION, SOLUTION INTRAVENOUS; SUBCUTANEOUS
Status: DISCONTINUED | OUTPATIENT
Start: 2019-05-03 | End: 2019-05-04 | Stop reason: HOSPADM

## 2019-05-03 RX ORDER — SYRING-NEEDL,DISP,INSUL,0.3 ML 29 G X1/2"
300 SYRINGE, EMPTY DISPOSABLE MISCELLANEOUS ONCE
Status: COMPLETED | OUTPATIENT
Start: 2019-05-03 | End: 2019-05-03

## 2019-05-03 RX ORDER — PSEUDOEPHEDRINE/ACETAMINOPHEN 30MG-500MG
100 TABLET ORAL ONCE
Status: COMPLETED | OUTPATIENT
Start: 2019-05-03 | End: 2019-05-03

## 2019-05-03 RX ORDER — INSULIN ASPART 100 [IU]/ML
8 INJECTION, SOLUTION INTRAVENOUS; SUBCUTANEOUS
Status: DISCONTINUED | OUTPATIENT
Start: 2019-05-04 | End: 2019-05-04 | Stop reason: HOSPADM

## 2019-05-03 RX ORDER — DOCUSATE SODIUM 100 MG/1
100 CAPSULE, LIQUID FILLED ORAL
Refills: 0 | COMMUNITY
Start: 2019-05-03

## 2019-05-03 RX ORDER — POTASSIUM CHLORIDE 750 MG/1
40 CAPSULE, EXTENDED RELEASE ORAL ONCE
Status: COMPLETED | OUTPATIENT
Start: 2019-05-03 | End: 2019-05-03

## 2019-05-03 RX ORDER — BISACODYL 5 MG
10 TABLET, DELAYED RELEASE (ENTERIC COATED) ORAL DAILY
Status: DISCONTINUED | OUTPATIENT
Start: 2019-05-03 | End: 2019-05-04 | Stop reason: HOSPADM

## 2019-05-03 RX ORDER — IBUPROFEN 200 MG
16 TABLET ORAL
Status: DISCONTINUED | OUTPATIENT
Start: 2019-05-03 | End: 2019-05-04 | Stop reason: HOSPADM

## 2019-05-03 RX ORDER — KETOCONAZOLE 200 MG/1
100 TABLET ORAL DAILY
Qty: 15 TABLET | Refills: 0 | Status: SHIPPED | OUTPATIENT
Start: 2019-05-03 | End: 2019-05-09

## 2019-05-03 RX ORDER — IBUPROFEN 200 MG
24 TABLET ORAL
Status: DISCONTINUED | OUTPATIENT
Start: 2019-05-03 | End: 2019-05-04 | Stop reason: HOSPADM

## 2019-05-03 RX ORDER — TACROLIMUS 1 MG/1
8 CAPSULE ORAL EVERY 12 HOURS
Qty: 480 CAPSULE | Refills: 11 | Status: SHIPPED | OUTPATIENT
Start: 2019-05-03 | End: 2019-05-30 | Stop reason: SDUPTHER

## 2019-05-03 RX ORDER — MAGNESIUM SULFATE HEPTAHYDRATE 40 MG/ML
2 INJECTION, SOLUTION INTRAVENOUS ONCE
Status: COMPLETED | OUTPATIENT
Start: 2019-05-03 | End: 2019-05-03

## 2019-05-03 RX ORDER — GLUCAGON 1 MG
1 KIT INJECTION
Status: DISCONTINUED | OUTPATIENT
Start: 2019-05-03 | End: 2019-05-04 | Stop reason: HOSPADM

## 2019-05-03 RX ADMIN — DOCUSATE SODIUM 100 MG: 100 CAPSULE, LIQUID FILLED ORAL at 02:05

## 2019-05-03 RX ADMIN — HEPARIN SODIUM 5000 UNITS: 5000 INJECTION, SOLUTION INTRAVENOUS; SUBCUTANEOUS at 08:05

## 2019-05-03 RX ADMIN — Medication 100 ML: at 12:05

## 2019-05-03 RX ADMIN — DOCUSATE SODIUM 100 MG: 100 CAPSULE, LIQUID FILLED ORAL at 09:05

## 2019-05-03 RX ADMIN — OXYCODONE HYDROCHLORIDE 10 MG: 10 TABLET ORAL at 11:05

## 2019-05-03 RX ADMIN — MYCOPHENOLATE MOFETIL 1000 MG: 250 CAPSULE ORAL at 08:05

## 2019-05-03 RX ADMIN — SIMETHICONE CHEW TAB 80 MG 80 MG: 80 TABLET ORAL at 05:05

## 2019-05-03 RX ADMIN — INSULIN ASPART 8 UNITS: 100 INJECTION, SOLUTION INTRAVENOUS; SUBCUTANEOUS at 05:05

## 2019-05-03 RX ADMIN — FAMOTIDINE 20 MG: 20 TABLET, FILM COATED ORAL at 08:05

## 2019-05-03 RX ADMIN — NYSTATIN 500000 UNITS: 500000 SUSPENSION ORAL at 08:05

## 2019-05-03 RX ADMIN — TACROLIMUS 8 MG: 1 CAPSULE ORAL at 08:05

## 2019-05-03 RX ADMIN — MAGNESIUM CITRATE 300 ML: 1.75 LIQUID ORAL at 12:05

## 2019-05-03 RX ADMIN — NYSTATIN 500000 UNITS: 500000 SUSPENSION ORAL at 09:05

## 2019-05-03 RX ADMIN — PREDNISONE 20 MG: 10 TABLET ORAL at 08:05

## 2019-05-03 RX ADMIN — BISACODYL 10 MG: 5 TABLET, COATED ORAL at 12:05

## 2019-05-03 RX ADMIN — NYSTATIN 500000 UNITS: 500000 SUSPENSION ORAL at 02:05

## 2019-05-03 RX ADMIN — INSULIN ASPART 3 UNITS: 100 INJECTION, SOLUTION INTRAVENOUS; SUBCUTANEOUS at 08:05

## 2019-05-03 RX ADMIN — MAGNESIUM SULFATE IN WATER 2 G: 40 INJECTION, SOLUTION INTRAVENOUS at 09:05

## 2019-05-03 RX ADMIN — HEPARIN SODIUM 5000 UNITS: 5000 INJECTION, SOLUTION INTRAVENOUS; SUBCUTANEOUS at 02:05

## 2019-05-03 RX ADMIN — OXYCODONE HYDROCHLORIDE 10 MG: 10 TABLET ORAL at 02:05

## 2019-05-03 RX ADMIN — FUROSEMIDE 40 MG: 40 TABLET ORAL at 08:05

## 2019-05-03 RX ADMIN — KETOCONAZOLE 100 MG: 200 TABLET ORAL at 09:05

## 2019-05-03 RX ADMIN — INSULIN ASPART 1 UNITS: 100 INJECTION, SOLUTION INTRAVENOUS; SUBCUTANEOUS at 11:05

## 2019-05-03 RX ADMIN — DOCUSATE SODIUM 100 MG: 100 CAPSULE, LIQUID FILLED ORAL at 08:05

## 2019-05-03 RX ADMIN — SULFAMETHOXAZOLE AND TRIMETHOPRIM 1 TABLET: 400; 80 TABLET ORAL at 08:05

## 2019-05-03 RX ADMIN — POTASSIUM CHLORIDE 40 MEQ: 750 CAPSULE, EXTENDED RELEASE ORAL at 09:05

## 2019-05-03 RX ADMIN — INSULIN DETEMIR 16 UNITS: 100 INJECTION, SOLUTION SUBCUTANEOUS at 09:05

## 2019-05-03 RX ADMIN — INSULIN ASPART 8 UNITS: 100 INJECTION, SOLUTION INTRAVENOUS; SUBCUTANEOUS at 08:05

## 2019-05-03 RX ADMIN — INSULIN ASPART 8 UNITS: 100 INJECTION, SOLUTION INTRAVENOUS; SUBCUTANEOUS at 11:05

## 2019-05-03 RX ADMIN — TACROLIMUS 8 MG: 1 CAPSULE ORAL at 05:05

## 2019-05-03 RX ADMIN — SODIUM CHLORIDE 500 ML: 0.9 INJECTION, SOLUTION INTRAVENOUS at 10:05

## 2019-05-03 NOTE — SUBJECTIVE & OBJECTIVE
Scheduled Meds:   [START ON 5/6/2019] acyclovir  400 mg Oral BID    bisacodyl  10 mg Oral Daily    docusate sodium  100 mg Oral TID PC    ergocalciferol  50,000 Units Oral Q7 Days    famotidine  20 mg Oral QHS    furosemide  40 mg Oral Daily    heparin (porcine)  5,000 Units Subcutaneous Q8H    insulin aspart U-100  8 Units Subcutaneous TIDWM    insulin detemir U-100  16 Units Subcutaneous QHS    ketoconazole  100 mg Oral Daily    mycophenolate  1,000 mg Oral BID    nystatin  500,000 Units Mouth/Throat TID PC    predniSONE  20 mg Oral Daily    sulfamethoxazole-trimethoprim 400-80mg  1 tablet Oral Daily    tacrolimus  8 mg Oral BID     Continuous Infusions:    PRN Meds:sodium chloride, dextrose 50%, dextrose 50%, glucagon (human recombinant), glucose, glucose, hydrALAZINE, HYDROcodone-acetaminophen, insulin aspart U-100, oxyCODONE, oxyCODONE, simethicone    Review of Systems   Constitutional: Positive for activity change and appetite change. Negative for chills and fever.   Respiratory: Negative for shortness of breath and wheezing.    Cardiovascular: Positive for leg swelling.   Gastrointestinal: Positive for abdominal distention and abdominal pain. Negative for nausea and vomiting.   Genitourinary: Negative for decreased urine volume, difficulty urinating, dysuria and hematuria.   Skin: Positive for wound.   Allergic/Immunologic: Positive for immunocompromised state.   Neurological: Negative for dizziness and weakness.   Psychiatric/Behavioral: Negative for agitation and decreased concentration.     Objective:     Vital Signs (Most Recent):  Temp: 97.6 °F (36.4 °C) (05/03/19 1155)  Pulse: 98 (05/03/19 1210)  Resp: 14 (05/03/19 1210)  BP: (!) 149/80 (05/03/19 1210)  SpO2: 99 % (05/03/19 1210) Vital Signs (24h Range):  Temp:  [97.6 °F (36.4 °C)-98.5 °F (36.9 °C)] 97.6 °F (36.4 °C)  Pulse:  [73-98] 98  Resp:  [11-24] 14  SpO2:  [96 %-100 %] 99 %  BP: (132-169)/(71-89) 149/80     Weight: 87.2 kg (192 lb  3.9 oz)  Body mass index is 31.03 kg/m².    Intake/Output - Last 3 Shifts       05/01 0700 - 05/02 0659 05/02 0700 - 05/03 0659 05/03 0700 - 05/04 0659    P.O. 180 180     I.V. (mL/kg) 721.2 (8.1)      Total Intake(mL/kg) 901.2 (10.2) 180 (2.1)     Urine (mL/kg/hr)       Emesis/NG output       Drains       Other       Stool       Blood       Total Output       Net +901.2 +180            Urine Occurrence 2 x 2 x     Stool Occurrence 1 x 1 x           Physical Exam   Constitutional: He is oriented to person, place, and time. No distress.   Cardiovascular: Normal rate and regular rhythm.   No murmur heard.  Pulmonary/Chest: Effort normal and breath sounds normal. He has no wheezes. He has no rales.   Abdominal: Soft. He exhibits distension. There is no tenderness. There is no rebound and no guarding.       Neurological: He is alert and oriented to person, place, and time.   Skin: He is not diaphoretic.   Psychiatric: He has a normal mood and affect. His behavior is normal. Judgment and thought content normal.   Nursing note and vitals reviewed.      Laboratory:  Immunosuppressants         Stop Route Frequency     tacrolimus capsule 8 mg      -- Oral 2 times daily     mycophenolate capsule 1,000 mg      -- Oral 2 times daily        CBC:   Recent Labs   Lab 05/03/19  0600   WBC 6.80   RBC 2.50*   HGB 8.1*   HCT 23.0*   PLT 95*   MCV 92   MCH 32.4*   MCHC 35.2     CMP:   Recent Labs   Lab 05/03/19  0600   *   CALCIUM 8.3*   ALBUMIN 2.5*   PROT 5.1*   *   K 3.4*   CO2 26      BUN 13   CREATININE 0.8   ALKPHOS 95   *   AST 30   BILITOT 1.3*     Tacrolimus Lvl   Date Value Ref Range Status   05/03/2019 6.1 5.0 - 15.0 ng/mL Final     Comment:     Testing performed by Liquid Chromatography-Tandem  Mass Spectrometry (LC-MS/MS).  This test was developed and its performance characteristics  determined by [facility], Department of Pathology  and Laboratory Medicine in a manner consistent with  CLIA  requirements. It has not been cleared or approved by the [place]  Food and Drug Administration.  This test is used for clinical   purposes.  It should not be regarded as investigational or for  research.     05/02/2019 3.6 (L) 5.0 - 15.0 ng/mL Final     Comment:     Testing performed by Liquid Chromatography-Tandem  Mass Spectrometry (LC-MS/MS).  This test was developed and its performance characteristics  determined by [facility], Department of Pathology  and Laboratory Medicine in a manner consistent with CLIA  requirements. It has not been cleared or approved by the [place]  Food and Drug Administration.  This test is used for clinical   purposes.  It should not be regarded as investigational or for  research.     05/01/2019 2.1 (L) 5.0 - 15.0 ng/mL Final     Comment:     Testing performed by Liquid Chromatography-Tandem  Mass Spectrometry (LC-MS/MS).  This test was developed and its performance characteristics  determined by [facility], Department of Pathology  and Laboratory Medicine in a manner consistent with CLIA  requirements. It has not been cleared or approved by the [place]  Food and Drug Administration.  This test is used for clinical   purposes.  It should not be regarded as investigational or for  research.     04/30/2019 2.8 (L) 5.0 - 15.0 ng/mL Final     Comment:     Testing performed by Liquid Chromatography-Tandem  Mass Spectrometry (LC-MS/MS).  This test was developed and its performance characteristics  determined by [facility], Department of Pathology  and Laboratory Medicine in a manner consistent with CLIA  requirements. It has not been cleared or approved by the [place]  Food and Drug Administration.  This test is used for clinical   purposes.  It should not be regarded as investigational or for  research.         Labs within the past 24 hours have been reviewed.    Diagnostic Results:  None

## 2019-05-03 NOTE — PROGRESS NOTES
Met with patient and his wife in preparation for discharge tomorrow.  Maya from [facility] interpreted this session.  Reviewed their answers to the review questions in the discharge book. These questions cover post op care, medication management, infection prevention and emergency contacts.  Reviewed outpatient appointments.  These appointments were printed in [facility].  Allowed time for questions and answers.

## 2019-05-03 NOTE — CARE UPDATE
BG goal 140 - 180   BG reasonably controlled with current SQ insulin regimen.     Continue Levemir 16 units q HS     Continue Novolog to 8 units with meals. 20% increase. Patient has shown prandial excursions. Patient also remains on steroid therapy.     Increase to Moderate Dose SQ Insulin Correction Scale.    BG monitoring AC/HS     ** Please call Endocrine for any BG related issues   ** Please notify Endocrine for any change and/or advance in diet**     Discharge planning: Please notify Endo prior to patient discharge. Dosage adjustments may be needed.     1). Continue hospital MDI  2). BG logs and follow-up with Endo discharge clinic.

## 2019-05-03 NOTE — DISCHARGE SUMMARY
[facility]  Liver Transplant  Discharge Summary      Patient Name: Cesario Bailey  MRN: [id number]  Admission Date: 2019  Hospital Length of Stay: 7 days  Discharge Date and Time:  2019 12:26 PM  Attending Physician: Thien Villanueva MD   Discharging Provider: HEIDI Francois  Primary Care Provider: Primary Doctor No  Post-Operative Day: 7     ORGAN:   LIVER  Disease Etiology: Primary Liver Malignancy: Hepatoma (HCC) and Cirrhosis  Donor Type:    - Brain Death  CDC High Risk:   No  Donor CMV Status:   Donor CMV Status: Negative  Donor HBcAB:   Negative  Donor HCV Status:   Negative  Whole or Partial: Whole Liver  Biliary Anastomosis: End to End  Arterial Anatomy: Standard    HPI:   Mr. Bailey is a 54 y/o M with ESLD 2/2 ETOH and HCC s/p tace x 2. He was admitted for liver transplant.     Procedure(s) (LRB):  LAPAROTOMY, EXPLORATORY (Bilateral)  REMOVAL, DRAIN (N/A)     Hospital Course: He is now s/p DBD liver transplant on 2019. This patient will follow the Steroid Induction protocol.  This patients immunosuppression will include a steroid taper over 6 weeks, Cellcept for 3 months and Prograf maintenance.  Opportunistic infection prophylaxis will include acyclovir for 3 months (CMV D- R-), Bactrim for 6 months, and nystatin. He was transferred to [facility] on POD#3. Patient progressed well post operatively. Of note, medial JANICE drain with significant resistance during attempted removal POD #4 and required removal in OR on  (POD#5). Patient noted with constipation requiring enema on POD#7. He is now passing flatus, +BM. On day of discharge, patient feeling well, tolerating diet, pain controlled, ambulating. Chevron incision clean, dry, intact with staples in place. He is stable and ready for discharge. He will follow up with labs on 19 and transplant clinic appointment on 19. Patient verbalized his understanding prior to discharge.     Final Active  Diagnoses:    Diagnosis Date Noted POA    PRINCIPAL PROBLEM:  Liver replaced by transplant [Z94.4] 04/26/2019 Not Applicable    Acute blood loss anemia [D62] 04/30/2019 No    Hypomagnesemia [E83.42] 04/30/2019 No    Acidosis [E87.2] 04/30/2019 No    Anemia of chronic disease [D63.8] 04/30/2019 Yes    Type 2 diabetes mellitus with hyperglycemia [E11.65] 04/27/2019 Yes    Adverse effect of corticosteroids [T38.0X5A] 04/27/2019 No    Class 1 obesity due to excess calories in adult [E66.09] 04/27/2019 Yes    Long-term use of immunosuppressant medication [Z79.899] 04/26/2019 Not Applicable    Prophylactic immunotherapy [Z29.8] 04/26/2019 Not Applicable    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] 07/16/2018 Yes      Problems Resolved During this Admission:    Diagnosis Date Noted Date Resolved POA    Liver failure [K72.90] 04/28/2019 04/30/2019 Yes    Liver failure [K72.90] 04/26/2019 04/26/2019 Yes       Consults (From admission, onward)        Status Ordering Provider     Inpatient consult to Endocrinology  Once     Provider:  (Not yet assigned)    Completed LORI JUNIOR     Inpatient consult to Registered Dietitian/Nutritionist  Once     Provider:  (Not yet assigned)    Completed DANIEL DE LA CRUZ     Inpatient consult to Registered Dietitian/Nutritionist  Once     Provider:  (Not yet assigned)    Completed LORI JUNIOR          Pending Diagnostic Studies:     Procedure Component Value Units Date/Time    Freeze and Hold -BB HEP [[id number]] Collected:  04/27/19 0003    Order Status:  Sent Lab Status:  No result     Specimen:  Blood     Freeze and Hold -BB HEP [[id number]] Collected:  04/26/19 1325    Order Status:  Sent Lab Status:  No result     Specimen:  Blood     Hepatitis B surface antigen [[id number]] Collected:  04/26/19 1325    Order Status:  Sent Lab Status:  In process Updated:  04/26/19 1326    Specimen:  Blood         Significant Diagnostic Studies: Labs:   CMP   Recent Labs   Lab  05/03/19  0600 05/04/19  0530   * 136   K 3.4* 3.4*    105   CO2 26 23   * 116*   BUN 13 12   CREATININE 0.8 0.8   CALCIUM 8.3* 8.4*   PROT 5.1* 4.7*   ALBUMIN 2.5* 2.4*   BILITOT 1.3* 1.2*   ALKPHOS 95 83   AST 30 25   * 125*   ANIONGAP 5* 8   ESTGFRAFRICA >60.0 >60.0   EGFRNONAA >60.0 >60.0   CBC   Recent Labs   Lab 05/03/19  0600 05/04/19  0530 05/04/19  0803   WBC 6.80 5.57 5.92   HGB 8.1* 7.8* 8.1*   HCT 23.0* 22.3* 22.8*   PLT 95* 116* 117*    INR   Lab Results   Component Value Date    INR 1.1 05/01/2019    INR 1.1 04/30/2019    INR 1.1 04/29/2019     All labs within the past 24 hours have been reviewed    Microbiology:   Blood Culture No results found for: LABBLOO and Urine Culture    Lab Results   Component Value Date    LABURIN No growth 04/26/2019       The patients clinical status was discussed at multidisplinary rounds, involving transplant surgery, transplant medicine, pharmacy, nursing, nutrition, and social work    Discharged Condition: good    Disposition:     Follow Up:    Patient Instructions:   No discharge procedures on file.  Medications:  Reconciled Home Medications:      Medication List      START taking these medications    acyclovir 400 MG tablet  Commonly known as:  ZOVIRAX  Take 1 tablet (400 mg total) by mouth 2 (two) times daily. STOP 7/25/19     docusate sodium 100 MG capsule  Commonly known as:  COLACE  Take 1 capsule (100 mg total) by mouth 3 (three) times daily after meals.     ergocalciferol 50,000 unit Cap  Commonly known as:  ERGOCALCIFEROL  Take 1 capsule (50,000 Units total) by mouth every 7 days. elvin  Start taking on:  5/7/2019     famotidine 20 MG tablet  Commonly known as:  PEPCID  Take 1 tablet (20 mg total) by mouth every evening.     insulin aspart U-100 100 unit/mL (3 mL) Inpn pen  Commonly known as:  NovoLOG  Inject 8 Units into the skin 3 (three) times daily with meals.     ketoconazole 200 mg Tab  Commonly known as:  NIZORAL  Take 0.5  tablets (100 mg total) by mouth once daily.     mycophenolate 250 mg Cap  Commonly known as:  CELLCEPT  Take 4 capsules (1,000 mg total) by mouth 2 (two) times daily.     oxyCODONE 10 mg Tab immediate release tablet  Commonly known as:  ROXICODONE  Take 0.5-1 tablets (5-10 mg total) by mouth every 4 (four) hours as needed.     predniSONE 10 MG tablet  Commonly known as:  DELTASONE  Take by mouth daily:  20mg 5/2-5/8, 15mg 5/9-5/15, 10mg 5/16-5/22, 5mg 5/23-5/29, STOP 5/30     sulfamethoxazole-trimethoprim 400-80mg 400-80 mg per tablet  Commonly known as:  BACTRIM,SEPTRA  Take 1 tablet by mouth once daily. STOP 10/24/19     tacrolimus 1 MG Cap  Commonly known as:  PROGRAF  Take 8 capsules (8 mg total) by mouth every 12 (twelve) hours.        CHANGE how you take these medications    furosemide 40 MG tablet  Commonly known as:  LASIX  Take 1 tablet (40 mg total) by mouth 2 (two) times daily.  What changed:  when to take this     insulin glargine 100 unit/mL injection  Commonly known as:  LANTUS  Inject 16 Units into the skin every evening.  What changed:  how much to take        STOP taking these medications    ALDACTONE 100 MG tablet  Generic drug:  spironolactone     insulin regular 100 unit/mL injection  Commonly known as:  Humulin R     lactulose 20 gram/30 mL Soln  Commonly known as:  CHRONULAC     nadolol 20 MG tablet  Commonly known as:  CORGARD     XIFAXAN 550 mg Tab  Generic drug:  rifAXIMin          Time spent caring for patient (Greater than 1/2 spent in direct face-to-face contact): > 30 minutes    Sarah Persaud NP-C  Liver Transplant  [facility]

## 2019-05-03 NOTE — ASSESSMENT & PLAN NOTE
- LFTs improving daily  - Tolerating diet  - Medial JANICE drain with significant resistance during attempted removal POD #4, removed in OR on 5/1 (POD#5)  - Liver US 5/2 with good perfusion  - With increased abdominal distention and constipation- plan for KUB and enema  - Continue to monitor.

## 2019-05-03 NOTE — PLAN OF CARE
Problem: Adult Inpatient Plan of Care  Goal: Plan of Care Review  Outcome: Ongoing (interventions implemented as appropriate)  No acute events overnight. VSS. Pain better controlled.    Chevron with surgical dressing CDI.   Fall precautions maintained. Bed wheels locked, bed in lowest position, upper SR up x 2, call light in reach, non-skid socks when OOB. Instructed pt to call for assistance as needed.   Tentative d/c today.

## 2019-05-03 NOTE — PROGRESS NOTES
Discharge Note: GENI met with patient and pts wife today at bedside with  Janee via phone to discuss discharge plans.   Pt presents A&ox4 with good eye contact and engaging.  Pts wife Rd Rogers engaged as well.   SW discussed dc planning needs with patient and patients wife today, as team feels he will be ready for dc today.   Pts wife check into [facility] [id number].  Pt with no other psychosocial needs identified for dc.   Pt with no coping issues identified at this time.   GENI remains available for all transplant resources, education, psychosocial support and assist with all dc planning needs.

## 2019-05-03 NOTE — PLAN OF CARE
Problem: Adult Inpatient Plan of Care  Goal: Plan of Care Review  Outcome: Ongoing (interventions implemented as appropriate)  Pt AAOx4, BP elevated (140-150 systolic). All other VSS, afebrile. Pt c/o RLQ pain over old JANICE drain site - controlled with PRN medication. Surgical dsg still covering site - no drainage noted. Pt up to chair and ambulates independently. Suppository given - 1 BM this shift. Liver US today. Chevron incision GLADYS with staples - pt's wife painted with betadine. Pt in no apparent distress. No evidence of skin breakdown noted. Will continue to monitor.

## 2019-05-03 NOTE — PROGRESS NOTES
[facility]  Liver Transplant  Progress Note    Patient Name: Cesario Bailey  MRN: [id number]  Admission Date: 2019  Hospital Length of Stay: 7 days  Code Status: Full Code  Primary Care Provider: Primary Doctor No  Post-Operative Day: 7    ORGAN:   LIVER  Disease Etiology: Primary Liver Malignancy: Hepatoma (HCC) and Cirrhosis  Donor Type:    - Brain Death  CDC High Risk:   No  Donor CMV Status:   Donor CMV Status: Negative  Donor HBcAB:   Negative  Donor HCV Status:   Negative  Donor HBV MARIA R: Negative  Donor HCV MARIA R: Negative  Whole or Partial: Whole Liver  Biliary Anastomosis: End to End  Arterial Anatomy: Standard  Subjective:     History of Present Illness:  Mr. Bailey is a 52 y/o M with ESLD 2/2 ETOH and HCC s/p tace x 2. He was admitted for liver transplant.     Hospital Course:  He is now s/p DBD  liver transplant on 2019. This patient will follow the Steroid Induction protocol.  This patients immunosuppression will include a steroid taper over 6 weeks, Cellcept for 3 months and Prograf maintenance.  Opportunistic infection prophylaxis will include acyclovir for 3 months (CMV D- R-), Bactrim for 6 months, and nystatin. Surgery was without complication. He was transferred to TSU on POD#3.     Interval History: No acute events overnight. Patient with nausea and abdominal distention today. Reporting minimal flatus and only small BM with suppository yesterday. Plan for KUB and enema. Continue to monitor.     Scheduled Meds:   [START ON 2019] acyclovir  400 mg Oral BID    bisacodyl  10 mg Oral Daily    docusate sodium  100 mg Oral TID PC    ergocalciferol  50,000 Units Oral Q7 Days    famotidine  20 mg Oral QHS    furosemide  40 mg Oral Daily    heparin (porcine)  5,000 Units Subcutaneous Q8H    insulin aspart U-100  8 Units Subcutaneous TIDWM    insulin detemir U-100  16 Units Subcutaneous QHS    ketoconazole  100 mg Oral Daily    mycophenolate  1,000 mg  Oral BID    nystatin  500,000 Units Mouth/Throat TID PC    predniSONE  20 mg Oral Daily    sulfamethoxazole-trimethoprim 400-80mg  1 tablet Oral Daily    tacrolimus  8 mg Oral BID     Continuous Infusions:    PRN Meds:sodium chloride, dextrose 50%, dextrose 50%, glucagon (human recombinant), glucose, glucose, hydrALAZINE, HYDROcodone-acetaminophen, insulin aspart U-100, oxyCODONE, oxyCODONE, simethicone    Review of Systems   Constitutional: Positive for activity change and appetite change. Negative for chills and fever.   Respiratory: Negative for shortness of breath and wheezing.    Cardiovascular: Positive for leg swelling.   Gastrointestinal: Positive for abdominal distention and abdominal pain. Negative for nausea and vomiting.   Genitourinary: Negative for decreased urine volume, difficulty urinating, dysuria and hematuria.   Skin: Positive for wound.   Allergic/Immunologic: Positive for immunocompromised state.   Neurological: Negative for dizziness and weakness.   Psychiatric/Behavioral: Negative for agitation and decreased concentration.     Objective:     Vital Signs (Most Recent):  Temp: 97.6 °F (36.4 °C) (05/03/19 1155)  Pulse: 98 (05/03/19 1210)  Resp: 14 (05/03/19 1210)  BP: (!) 149/80 (05/03/19 1210)  SpO2: 99 % (05/03/19 1210) Vital Signs (24h Range):  Temp:  [97.6 °F (36.4 °C)-98.5 °F (36.9 °C)] 97.6 °F (36.4 °C)  Pulse:  [73-98] 98  Resp:  [11-24] 14  SpO2:  [96 %-100 %] 99 %  BP: (132-169)/(71-89) 149/80     Weight: 87.2 kg (192 lb 3.9 oz)  Body mass index is 31.03 kg/m².    Intake/Output - Last 3 Shifts       05/01 0700 - 05/02 0659 05/02 0700 - 05/03 0659 05/03 0700 - 05/04 0659    P.O. 180 180     I.V. (mL/kg) 721.2 (8.1)      Total Intake(mL/kg) 901.2 (10.2) 180 (2.1)     Urine (mL/kg/hr)       Emesis/NG output       Drains       Other       Stool       Blood       Total Output       Net +901.2 +180            Urine Occurrence 2 x 2 x     Stool Occurrence 1 x 1 x           Physical Exam    Constitutional: He is oriented to person, place, and time. No distress.   Cardiovascular: Normal rate and regular rhythm.   No murmur heard.  Pulmonary/Chest: Effort normal and breath sounds normal. He has no wheezes. He has no rales.   Abdominal: Soft. He exhibits distension. There is no tenderness. There is no rebound and no guarding.       Neurological: He is alert and oriented to person, place, and time.   Skin: He is not diaphoretic.   Psychiatric: He has a normal mood and affect. His behavior is normal. Judgment and thought content normal.   Nursing note and vitals reviewed.      Laboratory:  Immunosuppressants         Stop Route Frequency     tacrolimus capsule 8 mg      -- Oral 2 times daily     mycophenolate capsule 1,000 mg      -- Oral 2 times daily        CBC:   Recent Labs   Lab 05/03/19  0600   WBC 6.80   RBC 2.50*   HGB 8.1*   HCT 23.0*   PLT 95*   MCV 92   MCH 32.4*   MCHC 35.2     CMP:   Recent Labs   Lab 05/03/19  0600   *   CALCIUM 8.3*   ALBUMIN 2.5*   PROT 5.1*   *   K 3.4*   CO2 26      BUN 13   CREATININE 0.8   ALKPHOS 95   *   AST 30   BILITOT 1.3*     Tacrolimus Lvl   Date Value Ref Range Status   05/03/2019 6.1 5.0 - 15.0 ng/mL Final     Comment:     Testing performed by Liquid Chromatography-Tandem  Mass Spectrometry (LC-MS/MS).  This test was developed and its performance characteristics  determined by [facility], Department of Pathology  and Laboratory Medicine in a manner consistent with CLIA  requirements. It has not been cleared or approved by the [place]  Food and Drug Administration.  This test is used for clinical   purposes.  It should not be regarded as investigational or for  research.     05/02/2019 3.6 (L) 5.0 - 15.0 ng/mL Final     Comment:     Testing performed by Liquid Chromatography-Tandem  Mass Spectrometry (LC-MS/MS).  This test was developed and its performance characteristics  determined by [facility], Department of  Pathology  and Laboratory Medicine in a manner consistent with CLIA  requirements. It has not been cleared or approved by the [place]  Food and Drug Administration.  This test is used for clinical   purposes.  It should not be regarded as investigational or for  research.     05/01/2019 2.1 (L) 5.0 - 15.0 ng/mL Final     Comment:     Testing performed by Liquid Chromatography-Tandem  Mass Spectrometry (LC-MS/MS).  This test was developed and its performance characteristics  determined by [facility], Department of Pathology  and Laboratory Medicine in a manner consistent with CLIA  requirements. It has not been cleared or approved by the [place]  Food and Drug Administration.  This test is used for clinical   purposes.  It should not be regarded as investigational or for  research.     04/30/2019 2.8 (L) 5.0 - 15.0 ng/mL Final     Comment:     Testing performed by Liquid Chromatography-Tandem  Mass Spectrometry (LC-MS/MS).  This test was developed and its performance characteristics  determined by [facility], Department of Pathology  and Laboratory Medicine in a manner consistent with CLIA  requirements. It has not been cleared or approved by the [place]  Food and Drug Administration.  This test is used for clinical   purposes.  It should not be regarded as investigational or for  research.         Labs within the past 24 hours have been reviewed.    Diagnostic Results:  None    Assessment/Plan:     * Liver transplanted  - LFTs improving daily  - Tolerating diet  - Medial JANICE drain with significant resistance during attempted removal POD #4, removed in OR on 5/1 (POD#5)  - Liver US 5/2 with good perfusion  - With increased abdominal distention and constipation- plan for KUB and enema  - Continue to monitor.         Anemia of chronic disease  - Due to hx of ESLD  - See acute blood loss anemia.   - Monitor with daily cbc.       Acidosis  - Improving. Continue to monitor with daily labs.       Hypomagnesemia  -  Improved. Monitor with daily labs.       Acute blood loss anemia  - Expected post operatively  - H/H stable.  Cont to monitor.      Type 2 diabetes mellitus with hyperglycemia  - Endocrine following for help with management. Appreciate their assistance.         Prophylactic immunotherapy  - See long term use of immunosuppressant medication.       Long-term use of immunosuppressant medication  - Continue prograf, cellcept, steroids. Continue to monitor prograf level daily, monitor for toxic side effects, and adjust for therapeutic dose.       Thrombocytopenia due to hypersplenism  - Likely to continue to improve post liver transplant.           VTE Risk Mitigation (From admission, onward)        Ordered     heparin (porcine) injection 5,000 Units  Every 8 hours      04/27/19 0001     Place sequential compression device  Until discontinued      04/27/19 0001     IP VTE HIGH RISK PATIENT  Once      04/27/19 0001          The patients clinical status was discussed at multidisplinary rounds, involving transplant surgery, transplant medicine, pharmacy, nursing, nutrition, and social work    Discharge Planning: Not a candidate for discharge at this time.     Toma Scott PA-C  Liver Transplant  [facility]

## 2019-05-03 NOTE — PLAN OF CARE
Problem: Adult Inpatient Plan of Care  Goal: Plan of Care Review  Outcome: Ongoing (interventions implemented as appropriate)  Pt AAOx4, BP elevated (140's/100's), all other VSS. Pt c/o RLQ pain over old JANICE drain site and abdominal discomfort r/t gas. Xray of abdomen done today. Enema given today - moderate BM noted - pt reports abdominal discomfort subsided afterwards. 40 mEq of K given for level of 3.4 this AM. 2g IV Mag given for level of 1.5 this AM. Chevron incision GLADYS - staples intact and painted with betadine. No evidence of skin breakdown noted. BG monitored and maintained per order. Pt ambulated in hallway x1. Pt up to chair - instructed to use call light. Will continue to monitor.

## 2019-05-04 VITALS
OXYGEN SATURATION: 100 % | TEMPERATURE: 99 F | DIASTOLIC BLOOD PRESSURE: 87 MMHG | BODY MASS INDEX: 30.4 KG/M2 | WEIGHT: 189.13 LBS | HEIGHT: 66 IN | RESPIRATION RATE: 16 BRPM | HEART RATE: 78 BPM | SYSTOLIC BLOOD PRESSURE: 146 MMHG

## 2019-05-04 LAB
ABO + RH BLD: NORMAL
ALBUMIN SERPL BCP-MCNC: 2.4 G/DL (ref 3.5–5.2)
ALP SERPL-CCNC: 83 U/L (ref 55–135)
ALT SERPL W/O P-5'-P-CCNC: 125 U/L (ref 10–44)
ANION GAP SERPL CALC-SCNC: 8 MMOL/L (ref 8–16)
ANISOCYTOSIS BLD QL SMEAR: SLIGHT
AST SERPL-CCNC: 25 U/L (ref 10–40)
BASOPHILS # BLD AUTO: 0.01 K/UL (ref 0–0.2)
BASOPHILS # BLD AUTO: 0.01 K/UL (ref 0–0.2)
BASOPHILS NFR BLD: 0.2 % (ref 0–1.9)
BASOPHILS NFR BLD: 0.2 % (ref 0–1.9)
BILIRUB SERPL-MCNC: 1.2 MG/DL (ref 0.1–1)
BLD GP AB SCN CELLS X3 SERPL QL: NORMAL
BUN SERPL-MCNC: 12 MG/DL (ref 6–20)
CALCIUM SERPL-MCNC: 8.4 MG/DL (ref 8.7–10.5)
CHLORIDE SERPL-SCNC: 105 MMOL/L (ref 95–110)
CO2 SERPL-SCNC: 23 MMOL/L (ref 23–29)
CREAT SERPL-MCNC: 0.8 MG/DL (ref 0.5–1.4)
DIFFERENTIAL METHOD: ABNORMAL
DIFFERENTIAL METHOD: ABNORMAL
EOSINOPHIL # BLD AUTO: 0.8 K/UL (ref 0–0.5)
EOSINOPHIL # BLD AUTO: 0.8 K/UL (ref 0–0.5)
EOSINOPHIL NFR BLD: 13.8 % (ref 0–8)
EOSINOPHIL NFR BLD: 13.9 % (ref 0–8)
ERYTHROCYTE [DISTWIDTH] IN BLOOD BY AUTOMATED COUNT: 15.4 % (ref 11.5–14.5)
ERYTHROCYTE [DISTWIDTH] IN BLOOD BY AUTOMATED COUNT: 15.7 % (ref 11.5–14.5)
EST. GFR  (AFRICAN AMERICAN): >60 ML/MIN/1.73 M^2
EST. GFR  (NON AFRICAN AMERICAN): >60 ML/MIN/1.73 M^2
GLUCOSE SERPL-MCNC: 116 MG/DL (ref 70–110)
HCT VFR BLD AUTO: 22.3 % (ref 40–54)
HCT VFR BLD AUTO: 22.8 % (ref 40–54)
HGB BLD-MCNC: 7.8 G/DL (ref 14–18)
HGB BLD-MCNC: 8.1 G/DL (ref 14–18)
HYPOCHROMIA BLD QL SMEAR: ABNORMAL
IMM GRANULOCYTES # BLD AUTO: 0.11 K/UL (ref 0–0.04)
IMM GRANULOCYTES # BLD AUTO: 0.12 K/UL (ref 0–0.04)
IMM GRANULOCYTES NFR BLD AUTO: 1.9 % (ref 0–0.5)
IMM GRANULOCYTES NFR BLD AUTO: 2.2 % (ref 0–0.5)
LYMPHOCYTES # BLD AUTO: 0.7 K/UL (ref 1–4.8)
LYMPHOCYTES # BLD AUTO: 0.8 K/UL (ref 1–4.8)
LYMPHOCYTES NFR BLD: 11.1 % (ref 18–48)
LYMPHOCYTES NFR BLD: 14.7 % (ref 18–48)
MAGNESIUM SERPL-MCNC: 1.6 MG/DL (ref 1.6–2.6)
MCH RBC QN AUTO: 32.4 PG (ref 27–31)
MCH RBC QN AUTO: 32.6 PG (ref 27–31)
MCHC RBC AUTO-ENTMCNC: 35 G/DL (ref 32–36)
MCHC RBC AUTO-ENTMCNC: 35.5 G/DL (ref 32–36)
MCV RBC AUTO: 91 FL (ref 82–98)
MCV RBC AUTO: 93 FL (ref 82–98)
MONOCYTES # BLD AUTO: 0.5 K/UL (ref 0.3–1)
MONOCYTES # BLD AUTO: 0.5 K/UL (ref 0.3–1)
MONOCYTES NFR BLD: 8.3 % (ref 4–15)
MONOCYTES NFR BLD: 8.8 % (ref 4–15)
NEUTROPHILS # BLD AUTO: 3.4 K/UL (ref 1.8–7.7)
NEUTROPHILS # BLD AUTO: 3.8 K/UL (ref 1.8–7.7)
NEUTROPHILS NFR BLD: 60.3 % (ref 38–73)
NEUTROPHILS NFR BLD: 64.6 % (ref 38–73)
NRBC BLD-RTO: 0 /100 WBC
NRBC BLD-RTO: 0 /100 WBC
OVALOCYTES BLD QL SMEAR: ABNORMAL
PHOSPHATE SERPL-MCNC: 2.9 MG/DL (ref 2.7–4.5)
PLATELET # BLD AUTO: 116 K/UL (ref 150–350)
PLATELET # BLD AUTO: 117 K/UL (ref 150–350)
PMV BLD AUTO: 11 FL (ref 9.2–12.9)
PMV BLD AUTO: 11.1 FL (ref 9.2–12.9)
POCT GLUCOSE: 142 MG/DL (ref 70–110)
POCT GLUCOSE: 211 MG/DL (ref 70–110)
POIKILOCYTOSIS BLD QL SMEAR: SLIGHT
POLYCHROMASIA BLD QL SMEAR: ABNORMAL
POTASSIUM SERPL-SCNC: 3.4 MMOL/L (ref 3.5–5.1)
PROT SERPL-MCNC: 4.7 G/DL (ref 6–8.4)
RBC # BLD AUTO: 2.39 M/UL (ref 4.6–6.2)
RBC # BLD AUTO: 2.5 M/UL (ref 4.6–6.2)
SODIUM SERPL-SCNC: 136 MMOL/L (ref 136–145)
TACROLIMUS BLD-MCNC: 7.9 NG/ML (ref 5–15)
WBC # BLD AUTO: 5.57 K/UL (ref 3.9–12.7)
WBC # BLD AUTO: 5.92 K/UL (ref 3.9–12.7)

## 2019-05-04 PROCEDURE — 80197 ASSAY OF TACROLIMUS: CPT

## 2019-05-04 PROCEDURE — 25000003 PHARM REV CODE 250: Performed by: NURSE PRACTITIONER

## 2019-05-04 PROCEDURE — 63600175 PHARM REV CODE 636 W HCPCS: Performed by: NURSE PRACTITIONER

## 2019-05-04 PROCEDURE — 80053 COMPREHEN METABOLIC PANEL: CPT

## 2019-05-04 PROCEDURE — 99232 PR SUBSEQUENT HOSPITAL CARE,LEVL II: ICD-10-PCS | Mod: ,,, | Performed by: NURSE PRACTITIONER

## 2019-05-04 PROCEDURE — 99233 PR SUBSEQUENT HOSPITAL CARE,LEVL III: ICD-10-PCS | Mod: 24,,, | Performed by: NURSE PRACTITIONER

## 2019-05-04 PROCEDURE — 83735 ASSAY OF MAGNESIUM: CPT

## 2019-05-04 PROCEDURE — 84100 ASSAY OF PHOSPHORUS: CPT

## 2019-05-04 PROCEDURE — 85025 COMPLETE CBC W/AUTO DIFF WBC: CPT

## 2019-05-04 PROCEDURE — 63600175 PHARM REV CODE 636 W HCPCS: Performed by: STUDENT IN AN ORGANIZED HEALTH CARE EDUCATION/TRAINING PROGRAM

## 2019-05-04 PROCEDURE — 99233 SBSQ HOSP IP/OBS HIGH 50: CPT | Mod: 24,,, | Performed by: NURSE PRACTITIONER

## 2019-05-04 PROCEDURE — 25000003 PHARM REV CODE 250: Performed by: PHYSICIAN ASSISTANT

## 2019-05-04 PROCEDURE — 63600175 PHARM REV CODE 636 W HCPCS: Performed by: TRANSPLANT SURGERY

## 2019-05-04 PROCEDURE — 99232 SBSQ HOSP IP/OBS MODERATE 35: CPT | Mod: ,,, | Performed by: NURSE PRACTITIONER

## 2019-05-04 PROCEDURE — 25000003 PHARM REV CODE 250: Performed by: TRANSPLANT SURGERY

## 2019-05-04 PROCEDURE — 86850 RBC ANTIBODY SCREEN: CPT

## 2019-05-04 PROCEDURE — 25000003 PHARM REV CODE 250: Performed by: STUDENT IN AN ORGANIZED HEALTH CARE EDUCATION/TRAINING PROGRAM

## 2019-05-04 RX ORDER — INSULIN ASPART 100 [IU]/ML
8 INJECTION, SOLUTION INTRAVENOUS; SUBCUTANEOUS
Qty: 15 ML | Refills: 11
Start: 2019-05-04 | End: 2019-05-06 | Stop reason: SDUPTHER

## 2019-05-04 RX ORDER — INSULIN ASPART 100 [IU]/ML
8 INJECTION, SOLUTION INTRAVENOUS; SUBCUTANEOUS
Qty: 15 ML | Refills: 11 | Status: SHIPPED | OUTPATIENT
Start: 2019-05-04 | End: 2019-05-04

## 2019-05-04 RX ORDER — POTASSIUM CHLORIDE 20 MEQ/1
40 TABLET, EXTENDED RELEASE ORAL
Status: COMPLETED | OUTPATIENT
Start: 2019-05-04 | End: 2019-05-04

## 2019-05-04 RX ORDER — FUROSEMIDE 40 MG/1
40 TABLET ORAL 2 TIMES DAILY
Status: DISCONTINUED | OUTPATIENT
Start: 2019-05-04 | End: 2019-05-04 | Stop reason: HOSPADM

## 2019-05-04 RX ORDER — INSULIN GLARGINE 100 [IU]/ML
16 INJECTION, SOLUTION SUBCUTANEOUS NIGHTLY
Start: 2019-05-04 | End: 2019-05-06 | Stop reason: SDUPTHER

## 2019-05-04 RX ORDER — ACETAMINOPHEN 325 MG/1
325 TABLET ORAL EVERY 6 HOURS PRN
Status: DISCONTINUED | OUTPATIENT
Start: 2019-05-04 | End: 2019-05-04 | Stop reason: HOSPADM

## 2019-05-04 RX ORDER — FUROSEMIDE 40 MG/1
40 TABLET ORAL 2 TIMES DAILY
Qty: 60 TABLET | Refills: 11 | Status: SHIPPED | OUTPATIENT
Start: 2019-05-04 | End: 2019-05-14

## 2019-05-04 RX ORDER — PEN NEEDLE, DIABETIC 30 GX3/16"
1 NEEDLE, DISPOSABLE MISCELLANEOUS 3 TIMES DAILY
Qty: 100 EACH | Refills: 11 | Status: SHIPPED | OUTPATIENT
Start: 2019-05-04

## 2019-05-04 RX ORDER — ONDANSETRON 4 MG/1
4 TABLET, ORALLY DISINTEGRATING ORAL EVERY 6 HOURS PRN
Status: DISCONTINUED | OUTPATIENT
Start: 2019-05-04 | End: 2019-05-04 | Stop reason: HOSPADM

## 2019-05-04 RX ADMIN — DOCUSATE SODIUM 100 MG: 100 CAPSULE, LIQUID FILLED ORAL at 08:05

## 2019-05-04 RX ADMIN — ONDANSETRON 4 MG: 4 TABLET, ORALLY DISINTEGRATING ORAL at 08:05

## 2019-05-04 RX ADMIN — PREDNISONE 20 MG: 10 TABLET ORAL at 08:05

## 2019-05-04 RX ADMIN — NYSTATIN 500000 UNITS: 500000 SUSPENSION ORAL at 12:05

## 2019-05-04 RX ADMIN — FUROSEMIDE 40 MG: 40 TABLET ORAL at 08:05

## 2019-05-04 RX ADMIN — BISACODYL 10 MG: 5 TABLET, COATED ORAL at 08:05

## 2019-05-04 RX ADMIN — POTASSIUM CHLORIDE 40 MEQ: 1500 TABLET, EXTENDED RELEASE ORAL at 11:05

## 2019-05-04 RX ADMIN — INSULIN ASPART 4 UNITS: 100 INJECTION, SOLUTION INTRAVENOUS; SUBCUTANEOUS at 08:05

## 2019-05-04 RX ADMIN — TACROLIMUS 8 MG: 1 CAPSULE ORAL at 08:05

## 2019-05-04 RX ADMIN — ACETAMINOPHEN 325 MG: 325 TABLET ORAL at 10:05

## 2019-05-04 RX ADMIN — HEPARIN SODIUM 5000 UNITS: 5000 INJECTION, SOLUTION INTRAVENOUS; SUBCUTANEOUS at 04:05

## 2019-05-04 RX ADMIN — DOCUSATE SODIUM 100 MG: 100 CAPSULE, LIQUID FILLED ORAL at 12:05

## 2019-05-04 RX ADMIN — INSULIN ASPART 4 UNITS: 100 INJECTION, SOLUTION INTRAVENOUS; SUBCUTANEOUS at 12:05

## 2019-05-04 RX ADMIN — KETOCONAZOLE 100 MG: 200 TABLET ORAL at 08:05

## 2019-05-04 RX ADMIN — POTASSIUM CHLORIDE 40 MEQ: 1500 TABLET, EXTENDED RELEASE ORAL at 02:05

## 2019-05-04 RX ADMIN — SULFAMETHOXAZOLE AND TRIMETHOPRIM 1 TABLET: 400; 80 TABLET ORAL at 08:05

## 2019-05-04 RX ADMIN — MYCOPHENOLATE MOFETIL 1000 MG: 250 CAPSULE ORAL at 08:05

## 2019-05-04 RX ADMIN — INSULIN ASPART 8 UNITS: 100 INJECTION, SOLUTION INTRAVENOUS; SUBCUTANEOUS at 12:05

## 2019-05-04 RX ADMIN — NYSTATIN 500000 UNITS: 500000 SUSPENSION ORAL at 08:05

## 2019-05-04 NOTE — PLAN OF CARE
Problem: Adult Inpatient Plan of Care  Goal: Plan of Care Review  Outcome: Ongoing (interventions implemented as appropriate)  -Pt AAOx4  -Vital signs stable  -BG monitoring ACHS; BG HS was 267, 3 units of SSI given  -Chevron remains GLADYS with staples; CDI  -Pt received brown bomb enema yesterday with relief  -PRN oxycodone given for pain  -Dressing to R IJ changed   -LFTs continue to trend down  -Tentative DC today  -Fall precautions maintained, non skid socks worn  -No acute events/falls/injuries this shift  -Pt aware to call for help with ambulating.    -Bed lowered, locked, siderails up x2, and call bell in reach.    See flowsheet for assessment findings.  Will continue to monitor pt.

## 2019-05-04 NOTE — NURSING
Discharged per MD orders. Visi monitor removed. Central line removed. Discharge instructions given by this RN. Med teaching done by PharmD. Will follow up for labs in clinic tomorrow. Left unit with wife via wheelchair. Discharging to [facility].

## 2019-05-04 NOTE — PROGRESS NOTES
"[facility]  Endocrinology  Progress Note    Admit Date: 2019     Reason for Consult: Management of T2DM, Hyperglycemia     Surgical Procedure and Date: Liver Transplant 19    Diabetes diagnosis year: > 5 years ago  Lab Results   Component Value Date    HGBA1C 6.9 (H) 2019     Home Diabetes Medications:  Humulin 20 units TID with meals, Lantus 20-25 units q HS    How often checking glucose at home? in the AM/ when he feels bad   BG readings on regimen: 80s-100  Hypoglycemia on the regimen?  No  Missed doses on regimen?  No    Diabetes Complications include:     Hyperglycemia    Complicating diabetes co morbidities:   CIRRHOSIS      HPI:   Patient is a 53 y.o. male with a diagnosis of T2DM, etoh cirrhosis, portal HTN, esophageal varices, and ESLD who presented for liver transplant. Father with DM per chart review.  Endocrinology consulted for management of T2DM/hyperglycemia.                 Interval HPI:   Overnight events:   BG is reasonably controlled on current SQ insulin regimen. NAEON.     Eatin%  Nausea: Yes  Hypoglycemia and intervention: No  Fever: No  TPN and/or TF: No  If yes, type of TF/TPN and rate: none    BP (!) 153/83   Pulse 73   Temp 98.3 °F (36.8 °C) (Oral)   Resp 12   Ht 5' 6" (1.676 m)   Wt 85.8 kg (189 lb 2.5 oz)   SpO2 98%   BMI 30.53 kg/m²      Labs Reviewed and Include    Recent Labs   Lab 19  0530   *   CALCIUM 8.4*   ALBUMIN 2.4*   PROT 4.7*      K 3.4*   CO2 23      BUN 12   CREATININE 0.8   ALKPHOS 83   *   AST 25   BILITOT 1.2*     Lab Results   Component Value Date    WBC 5.57 2019    HGB 7.8 (L) 2019    HCT 22.3 (L) 2019    MCV 93 2019     (L) 2019     No results for input(s): TSH, FREET4 in the last 168 hours.  Lab Results   Component Value Date    HGBA1C 6.9 (H) 2019       Nutritional status:   Body mass index is 30.53 kg/m².  Lab Results   Component Value Date "    ALBUMIN 2.4 (L) 05/04/2019    ALBUMIN 2.5 (L) 05/03/2019    ALBUMIN 2.5 (L) 05/02/2019     No results found for: PREALBUMIN    Estimated Creatinine Clearance: 108.4 mL/min (based on SCr of 0.8 mg/dL).    Accu-Checks  Recent Labs     05/02/19  0809 05/02/19  1145 05/02/19  1723 05/02/19  2037 05/03/19  0742 05/03/19  1148 05/03/19  1618 05/03/19  1711 05/03/19  2059 05/04/19  0747   POCTGLUCOSE 184* 251* 354* 278* 138* 160* 275* 263* 267* 142*       Current Medications and/or Treatments Impacting Glycemic Control  Immunotherapy:    Immunosuppressants         Stop Route Frequency     tacrolimus capsule 8 mg      -- Oral 2 times daily     mycophenolate capsule 1,000 mg      -- Oral 2 times daily        Steroids:   Hormones (From admission, onward)    Start     Stop Route Frequency Ordered    05/02/19 0900  predniSONE tablet 20 mg  (methylprednisolone taper panel)      -- Oral Daily 04/27/19 0001        Pressors:    Autonomic Drugs (From admission, onward)    None        Hyperglycemia/Diabetes Medications:   Antihyperglycemics (From admission, onward)    Start     Stop Route Frequency Ordered    05/04/19 0715  insulin aspart U-100 pen 8 Units      -- SubQ 3 times daily with meals 05/03/19 1750    05/03/19 2100  insulin detemir U-100 pen 16 Units      -- SubQ Nightly 05/03/19 1748    05/03/19 1848  insulin aspart U-100 pen 1-10 Units      -- SubQ Before meals & nightly PRN 05/03/19 1748    04/29/19 0900  insulin regular (Humulin R) 100 Units in sodium chloride 0.9% 100 mL infusion      05/01 2100 IV Continuous 04/29/19 2077          ASSESSMENT and PLAN    * Liver transplanted  Managed per primary team  Avoid hypoglycemia        Type 2 diabetes mellitus with hyperglycemia  BG goal 140 - 180     Levemir 16 units q HS (reduced home basal by 20%)  Continue Novolog to 8 units with meals. BG is within goal. Patient is now eating 100% of meals, and has shown prandial excursions. Patient also remains on steroid taper therapy.  "  Low dose correction scale  BG monitoring AC/HS    ADDENDUM:    Morning novolog 4 units given secondary to patient having nausea, and having reduced caloric intake for that particular meal.     ** Please call Endocrine for any BG related issues   ** Please notify Endocrine for any change and/or advance in diet**    Discharge planning:   MDI:   Lantus 16 units HS  Humalog 8 units TIDWM with following correction:    150 - 200 + 1 unit     201 - 250 + 2 units    251 - 300 + 3 units    301 - 350 + 4 units      > 350   + 5 units  BG monitoring AC/HS    BG logs will need to be provided to patient at time of discharge.   Please insure patient has insurance preferred DM testing supplies when staying at local apartments. As well as, Ultra-Fine Kaitlin Pen Needles 4mm x 32 G (5/32" x 0.23mm).   follow-up with Endo discharge clinic.         Long-term use of immunosuppressant medication  On immunosepresive therapy per transplant team; may elevate BG readings        Prophylactic immunotherapy  May increase insulin requirements      Adverse effect of corticosteroids  Glucocorticoids markedly increase glucoses.   May increase insulin requirements.        Class 1 obesity due to excess calories in adult  May increase insulin resistance.   Body mass index is 30.53 kg/m².            Krunal Sharma, NP  Endocrinology  [facility]  "

## 2019-05-04 NOTE — PROGRESS NOTES
DISCHARGE NOTE:    Cesario Bailey is a 53 y.o. male s/p   LIVER    - Brain Death transplant on 2019 (Liver) for ESLD secondary to Primary Liver Malignancy: Hepatoma (HCC) and Cirrhosis.      Past Medical History:   Diagnosis Date    Alcoholic cirrhosis     Diabetes mellitus     Encounter for blood transfusion     HCC (hepatocellular carcinoma)     Portal hypertension        Hospital Course: Complicated by constipation and possible ileus. Resolved, sent home with stool softener.    Allergies:   Review of patient's allergies indicates:   Allergen Reactions    Aspirin        [facility] for tac[facility], [facility] for all others.    Discharge Medications:   Cesario Bailey   Home Medication Instructions BILLIE:[id number]    Printed on:19 1230   Medication Information                      acyclovir (ZOVIRAX) 400 MG tablet  Take 1 tablet (400 mg total) by mouth 2 (two) times daily. STOP 19             docusate sodium (COLACE) 100 MG capsule  Take 1 capsule (100 mg total) by mouth 3 (three) times daily after meals.             ergocalciferol (ERGOCALCIFEROL) 50,000 unit Cap  Take 1 capsule (50,000 Units total) by mouth every 7 days. elvin             famotidine (PEPCID) 20 MG tablet  Take 1 tablet (20 mg total) by mouth every evening.             furosemide (LASIX) 40 MG tablet  Take 1 tablet (40 mg total) by mouth 2 (two) times daily.             insulin aspart U-100 (NOVOLOG) 100 unit/mL (3 mL) InPn pen  Inject 8 Units into the skin 3 (three) times daily with meals.             insulin glargine (LANTUS) 100 unit/mL injection  Inject 16 Units into the skin every evening.             ketoconazole (NIZORAL) 200 mg Tab  Take 0.5 tablets (100 mg total) by mouth once daily.             mycophenolate (CELLCEPT) 250 mg Cap  Take 4 capsules (1,000 mg total) by mouth 2 (two) times daily.             oxyCODONE (ROXICODONE) 10 mg Tab immediate release tablet  Take 0.5-1 tablets (5-10 mg total) by  mouth every 4 (four) hours as needed.             predniSONE (DELTASONE) 10 MG tablet  Take by mouth daily:  20mg 5/2-5/8, 15mg 5/9-5/15, 10mg 5/16-5/22, 5mg 5/23-5/29, STOP 5/30             sulfamethoxazole-trimethoprim 400-80mg (BACTRIM,SEPTRA) 400-80 mg per tablet  Take 1 tablet by mouth once daily. STOP 10/24/19             tacrolimus (PROGRAF) 1 MG Cap  Take 8 capsules (8 mg total) by mouth every 12 (twelve) hours.                 Pharmacy Interventions/Recommendations:  1) Transplant Immunosuppression: tac 8/8, MMF, pred taper    2) Opportunistic Infection prophylaxis: acyclovir, bactrim    3) Patient Counseling/Education:  Demonstrated the use of the BP cuff, thermometer.    4) Follow-Up/Discharge Needs:    -needs pen needles, sent rx to [facility].    -confirm has lantus and novolog (says he had at hot)    5) Patient received prescriptions for:      E-rx's:  As above         Printed rx's:  none      Faxed / Phoned in rx's:  none  To [facility] pharmacy per patient request.    6) Patient Assistance Information: none    7) The following medications have been placed on HOLD and should be restarted in the outpatient setting (when appropriate): none    Cesario and his caregiver verbalized their understanding and had the opportunity to ask questions.

## 2019-05-04 NOTE — ASSESSMENT & PLAN NOTE
"BG goal 140 - 180     Levemir 16 units q HS (reduced home basal by 20%)  Continue Novolog to 8 units with meals. BG is within goal. Patient is now eating 100% of meals, and has shown prandial excursions. Patient also remains on steroid taper therapy.   Low dose correction scale  BG monitoring AC/HS    ADDENDUM:    Morning novolog 4 units given secondary to patient having nausea, and having reduced caloric intake for that particular meal.     ** Please call Endocrine for any BG related issues   ** Please notify Endocrine for any change and/or advance in diet**    Discharge planning:   MDI:   Lantus 16 units HS  Humalog 8 units TIDWM with following correction:    150 - 200 + 1 unit     201 - 250 + 2 units    251 - 300 + 3 units    301 - 350 + 4 units      > 350   + 5 units  BG monitoring AC/HS    BG logs will need to be provided to patient at time of discharge.   Please insure patient has insurance preferred DM testing supplies when staying at local apartments. As well as, Ultra-Fine Kaitlin Pen Needles 4mm x 32 G (5/32" x 0.23mm).   follow-up with Endo discharge clinic.       "

## 2019-05-04 NOTE — SUBJECTIVE & OBJECTIVE
"Interval HPI:   Overnight events:   BG is reasonably controlled on current SQ insulin regimen. NAEON.     Eatin%  Nausea: Yes  Hypoglycemia and intervention: No  Fever: No  TPN and/or TF: No  If yes, type of TF/TPN and rate: none    BP (!) 153/83   Pulse 73   Temp 98.3 °F (36.8 °C) (Oral)   Resp 12   Ht 5' 6" (1.676 m)   Wt 85.8 kg (189 lb 2.5 oz)   SpO2 98%   BMI 30.53 kg/m²     Labs Reviewed and Include    Recent Labs   Lab 19  0530   *   CALCIUM 8.4*   ALBUMIN 2.4*   PROT 4.7*      K 3.4*   CO2 23      BUN 12   CREATININE 0.8   ALKPHOS 83   *   AST 25   BILITOT 1.2*     Lab Results   Component Value Date    WBC 5.57 2019    HGB 7.8 (L) 2019    HCT 22.3 (L) 2019    MCV 93 2019     (L) 2019     No results for input(s): TSH, FREET4 in the last 168 hours.  Lab Results   Component Value Date    HGBA1C 6.9 (H) 2019       Nutritional status:   Body mass index is 30.53 kg/m².  Lab Results   Component Value Date    ALBUMIN 2.4 (L) 2019    ALBUMIN 2.5 (L) 2019    ALBUMIN 2.5 (L) 2019     No results found for: PREALBUMIN    Estimated Creatinine Clearance: 108.4 mL/min (based on SCr of 0.8 mg/dL).    Accu-Checks  Recent Labs     19  0809 19  1145 19  1723 19  2037 19  0742 19  1148 19  1618 19  1711 19  2059 19  0747   POCTGLUCOSE 184* 251* 354* 278* 138* 160* 275* 263* 267* 142*       Current Medications and/or Treatments Impacting Glycemic Control  Immunotherapy:    Immunosuppressants         Stop Route Frequency     tacrolimus capsule 8 mg      -- Oral 2 times daily     mycophenolate capsule 1,000 mg      -- Oral 2 times daily        Steroids:   Hormones (From admission, onward)    Start     Stop Route Frequency Ordered    19 0900  predniSONE tablet 20 mg  (methylprednisolone taper panel)      -- Oral Daily 19 0001        Pressors:    Autonomic " Drugs (From admission, onward)    None        Hyperglycemia/Diabetes Medications:   Antihyperglycemics (From admission, onward)    Start     Stop Route Frequency Ordered    05/04/19 0715  insulin aspart U-100 pen 8 Units      -- SubQ 3 times daily with meals 05/03/19 1750 05/03/19 2100  insulin detemir U-100 pen 16 Units      -- SubQ Nightly 05/03/19 1748 05/03/19 1848  insulin aspart U-100 pen 1-10 Units      -- SubQ Before meals & nightly PRN 05/03/19 1748    04/29/19 0900  insulin regular (Humulin R) 100 Units in sodium chloride 0.9% 100 mL infusion      05/01 2100 IV Continuous 04/29/19 0752

## 2019-05-05 ENCOUNTER — TELEPHONE (OUTPATIENT)
Dept: TRANSPLANT | Facility: CLINIC | Age: 54
End: 2019-05-05

## 2019-05-05 ENCOUNTER — LAB VISIT (OUTPATIENT)
Dept: LAB | Facility: HOSPITAL | Age: 54
End: 2019-05-05
Attending: SURGERY
Payer: COMMERCIAL

## 2019-05-05 DIAGNOSIS — Z94.4 LIVER REPLACED BY TRANSPLANT: ICD-10-CM

## 2019-05-05 LAB
ALBUMIN SERPL BCP-MCNC: 2.6 G/DL (ref 3.5–5.2)
ALP SERPL-CCNC: 87 U/L (ref 55–135)
ALT SERPL W/O P-5'-P-CCNC: 107 U/L (ref 10–44)
ANION GAP SERPL CALC-SCNC: 8 MMOL/L (ref 8–16)
AST SERPL-CCNC: 27 U/L (ref 10–40)
BASOPHILS # BLD AUTO: 0.02 K/UL (ref 0–0.2)
BASOPHILS NFR BLD: 0.3 % (ref 0–1.9)
BILIRUB DIRECT SERPL-MCNC: 0.5 MG/DL (ref 0.1–0.3)
BILIRUB SERPL-MCNC: 1.1 MG/DL (ref 0.1–1)
BLD PROD TYP BPU: NORMAL
BLD PROD TYP BPU: NORMAL
BLOOD UNIT EXPIRATION DATE: NORMAL
BLOOD UNIT EXPIRATION DATE: NORMAL
BLOOD UNIT TYPE CODE: 6200
BLOOD UNIT TYPE CODE: 6200
BLOOD UNIT TYPE: NORMAL
BLOOD UNIT TYPE: NORMAL
BUN SERPL-MCNC: 11 MG/DL (ref 6–20)
CALCIUM SERPL-MCNC: 8.8 MG/DL (ref 8.7–10.5)
CHLORIDE SERPL-SCNC: 106 MMOL/L (ref 95–110)
CO2 SERPL-SCNC: 22 MMOL/L (ref 23–29)
CODING SYSTEM: NORMAL
CODING SYSTEM: NORMAL
CREAT SERPL-MCNC: 1 MG/DL (ref 0.5–1.4)
DIFFERENTIAL METHOD: ABNORMAL
DISPENSE STATUS: NORMAL
DISPENSE STATUS: NORMAL
EOSINOPHIL # BLD AUTO: 0.6 K/UL (ref 0–0.5)
EOSINOPHIL NFR BLD: 9 % (ref 0–8)
ERYTHROCYTE [DISTWIDTH] IN BLOOD BY AUTOMATED COUNT: 16.4 % (ref 11.5–14.5)
EST. GFR  (AFRICAN AMERICAN): >60 ML/MIN/1.73 M^2
EST. GFR  (NON AFRICAN AMERICAN): >60 ML/MIN/1.73 M^2
GLUCOSE SERPL-MCNC: 109 MG/DL (ref 70–110)
HCT VFR BLD AUTO: 26.1 % (ref 40–54)
HGB BLD-MCNC: 8.7 G/DL (ref 14–18)
IMM GRANULOCYTES # BLD AUTO: 0.11 K/UL (ref 0–0.04)
IMM GRANULOCYTES NFR BLD AUTO: 1.7 % (ref 0–0.5)
LYMPHOCYTES # BLD AUTO: 0.8 K/UL (ref 1–4.8)
LYMPHOCYTES NFR BLD: 12.7 % (ref 18–48)
MCH RBC QN AUTO: 32.1 PG (ref 27–31)
MCHC RBC AUTO-ENTMCNC: 33.3 G/DL (ref 32–36)
MCV RBC AUTO: 96 FL (ref 82–98)
MONOCYTES # BLD AUTO: 0.5 K/UL (ref 0.3–1)
MONOCYTES NFR BLD: 8.1 % (ref 4–15)
NEUTROPHILS # BLD AUTO: 4.4 K/UL (ref 1.8–7.7)
NEUTROPHILS NFR BLD: 68.2 % (ref 38–73)
NRBC BLD-RTO: 0 /100 WBC
PLATELET # BLD AUTO: 158 K/UL (ref 150–350)
PMV BLD AUTO: 11 FL (ref 9.2–12.9)
POTASSIUM SERPL-SCNC: 4.5 MMOL/L (ref 3.5–5.1)
PROT SERPL-MCNC: 5.4 G/DL (ref 6–8.4)
RBC # BLD AUTO: 2.71 M/UL (ref 4.6–6.2)
SODIUM SERPL-SCNC: 136 MMOL/L (ref 136–145)
TACROLIMUS BLD-MCNC: 9.2 NG/ML (ref 5–15)
TRANS ERYTHROCYTES VOL PATIENT: NORMAL ML
TRANS ERYTHROCYTES VOL PATIENT: NORMAL ML
WBC # BLD AUTO: 6.45 K/UL (ref 3.9–12.7)

## 2019-05-05 PROCEDURE — 85025 COMPLETE CBC W/AUTO DIFF WBC: CPT

## 2019-05-05 PROCEDURE — 82248 BILIRUBIN DIRECT: CPT

## 2019-05-05 PROCEDURE — 80197 ASSAY OF TACROLIMUS: CPT

## 2019-05-05 PROCEDURE — 80053 COMPREHEN METABOLIC PANEL: CPT

## 2019-05-05 PROCEDURE — 36415 COLL VENOUS BLD VENIPUNCTURE: CPT

## 2019-05-06 ENCOUNTER — LAB VISIT (OUTPATIENT)
Dept: LAB | Facility: HOSPITAL | Age: 54
End: 2019-05-06
Attending: SURGERY
Payer: COMMERCIAL

## 2019-05-06 ENCOUNTER — CLINICAL SUPPORT (OUTPATIENT)
Dept: TRANSPLANT | Facility: CLINIC | Age: 54
End: 2019-05-06
Payer: COMMERCIAL

## 2019-05-06 ENCOUNTER — TELEPHONE (OUTPATIENT)
Dept: ENDOCRINOLOGY | Facility: HOSPITAL | Age: 54
End: 2019-05-06

## 2019-05-06 VITALS
WEIGHT: 186.06 LBS | HEART RATE: 91 BPM | RESPIRATION RATE: 18 BRPM | TEMPERATURE: 98 F | DIASTOLIC BLOOD PRESSURE: 82 MMHG | BODY MASS INDEX: 29.2 KG/M2 | DIASTOLIC BLOOD PRESSURE: 82 MMHG | RESPIRATION RATE: 18 BRPM | SYSTOLIC BLOOD PRESSURE: 141 MMHG | TEMPERATURE: 98 F | WEIGHT: 186.06 LBS | OXYGEN SATURATION: 98 % | BODY MASS INDEX: 29.2 KG/M2 | OXYGEN SATURATION: 98 % | HEIGHT: 67 IN | HEART RATE: 91 BPM | HEIGHT: 67 IN | SYSTOLIC BLOOD PRESSURE: 141 MMHG

## 2019-05-06 DIAGNOSIS — Z94.4 LIVER REPLACED BY TRANSPLANT: ICD-10-CM

## 2019-05-06 DIAGNOSIS — Z94.4 LIVER REPLACED BY TRANSPLANT: Primary | ICD-10-CM

## 2019-05-06 DIAGNOSIS — E11.65 TYPE 2 DIABETES MELLITUS WITH HYPERGLYCEMIA, WITH LONG-TERM CURRENT USE OF INSULIN: Primary | ICD-10-CM

## 2019-05-06 DIAGNOSIS — Z79.4 TYPE 2 DIABETES MELLITUS WITH HYPERGLYCEMIA, WITH LONG-TERM CURRENT USE OF INSULIN: Primary | ICD-10-CM

## 2019-05-06 LAB
ALBUMIN SERPL BCP-MCNC: 2.8 G/DL (ref 3.5–5.2)
ALP SERPL-CCNC: 79 U/L (ref 55–135)
ALT SERPL W/O P-5'-P-CCNC: 82 U/L (ref 10–44)
ANION GAP SERPL CALC-SCNC: 10 MMOL/L (ref 8–16)
AST SERPL-CCNC: 16 U/L (ref 10–40)
BASOPHILS # BLD AUTO: 0.01 K/UL (ref 0–0.2)
BASOPHILS NFR BLD: 0.2 % (ref 0–1.9)
BILIRUB DIRECT SERPL-MCNC: 0.5 MG/DL (ref 0.1–0.3)
BILIRUB SERPL-MCNC: 1 MG/DL (ref 0.1–1)
BUN SERPL-MCNC: 12 MG/DL (ref 6–20)
CALCIUM SERPL-MCNC: 8.8 MG/DL (ref 8.7–10.5)
CHLORIDE SERPL-SCNC: 102 MMOL/L (ref 95–110)
CO2 SERPL-SCNC: 23 MMOL/L (ref 23–29)
CREAT SERPL-MCNC: 1.1 MG/DL (ref 0.5–1.4)
DIFFERENTIAL METHOD: ABNORMAL
EOSINOPHIL # BLD AUTO: 0.4 K/UL (ref 0–0.5)
EOSINOPHIL NFR BLD: 6.3 % (ref 0–8)
ERYTHROCYTE [DISTWIDTH] IN BLOOD BY AUTOMATED COUNT: 17.2 % (ref 11.5–14.5)
EST. GFR  (AFRICAN AMERICAN): >60 ML/MIN/1.73 M^2
EST. GFR  (NON AFRICAN AMERICAN): >60 ML/MIN/1.73 M^2
GLUCOSE SERPL-MCNC: 156 MG/DL (ref 70–110)
HCT VFR BLD AUTO: 26 % (ref 40–54)
HGB BLD-MCNC: 8.6 G/DL (ref 14–18)
IMM GRANULOCYTES # BLD AUTO: 0.09 K/UL (ref 0–0.04)
IMM GRANULOCYTES NFR BLD AUTO: 1.6 % (ref 0–0.5)
LYMPHOCYTES # BLD AUTO: 0.8 K/UL (ref 1–4.8)
LYMPHOCYTES NFR BLD: 13.6 % (ref 18–48)
MCH RBC QN AUTO: 31.9 PG (ref 27–31)
MCHC RBC AUTO-ENTMCNC: 33.1 G/DL (ref 32–36)
MCV RBC AUTO: 96 FL (ref 82–98)
MONOCYTES # BLD AUTO: 0.5 K/UL (ref 0.3–1)
MONOCYTES NFR BLD: 9.4 % (ref 4–15)
NEUTROPHILS # BLD AUTO: 4 K/UL (ref 1.8–7.7)
NEUTROPHILS NFR BLD: 68.9 % (ref 38–73)
NRBC BLD-RTO: 0 /100 WBC
PLATELET # BLD AUTO: 153 K/UL (ref 150–350)
PMV BLD AUTO: 10.2 FL (ref 9.2–12.9)
POTASSIUM SERPL-SCNC: 4.2 MMOL/L (ref 3.5–5.1)
PROT SERPL-MCNC: 5.3 G/DL (ref 6–8.4)
RBC # BLD AUTO: 2.7 M/UL (ref 4.6–6.2)
SODIUM SERPL-SCNC: 135 MMOL/L (ref 136–145)
TACROLIMUS BLD-MCNC: 8.9 NG/ML (ref 5–15)
WBC # BLD AUTO: 5.73 K/UL (ref 3.9–12.7)

## 2019-05-06 PROCEDURE — 80053 COMPREHEN METABOLIC PANEL: CPT

## 2019-05-06 PROCEDURE — 99999 PR PBB SHADOW E&M-EST. PATIENT-LVL III: CPT | Mod: PBBFAC,,,

## 2019-05-06 PROCEDURE — 99999 PR PBB SHADOW E&M-EST. PATIENT-LVL III: ICD-10-PCS | Mod: PBBFAC,,,

## 2019-05-06 PROCEDURE — 82248 BILIRUBIN DIRECT: CPT

## 2019-05-06 PROCEDURE — 36415 COLL VENOUS BLD VENIPUNCTURE: CPT

## 2019-05-06 PROCEDURE — 80197 ASSAY OF TACROLIMUS: CPT

## 2019-05-06 PROCEDURE — 85025 COMPLETE CBC W/AUTO DIFF WBC: CPT

## 2019-05-06 RX ORDER — INSULIN ASPART 100 [IU]/ML
8 INJECTION, SOLUTION INTRAVENOUS; SUBCUTANEOUS
Qty: 15 ML | Refills: 0 | Status: SHIPPED | OUTPATIENT
Start: 2019-05-06 | End: 2019-05-29 | Stop reason: SDUPTHER

## 2019-05-06 RX ORDER — INSULIN GLARGINE 100 [IU]/ML
16 INJECTION, SOLUTION SUBCUTANEOUS NIGHTLY
Qty: 15 ML | Refills: 0 | Status: SHIPPED | OUTPATIENT
Start: 2019-05-06

## 2019-05-06 NOTE — TELEPHONE ENCOUNTER
----- Message from Vincent Aquino MD sent at 5/6/2019  3:53 PM CDT -----  Results ok. No action required.

## 2019-05-06 NOTE — TELEPHONE ENCOUNTER
Informed Tyesha with International department labs reviewed with Dr. Aquino, no medication changes needed. Will repeat labs Thurs 5/9. She will notify the pt.

## 2019-05-06 NOTE — PROGRESS NOTES
Clinic Note: First Return to Clinic Post-  Liver Transplant    Cesario Bailey  is a 53 y.o. male  S/p   LIVER transplant on 4/26/2019 (Liver) for Primary Liver Malignancy: Hepatoma (HCC) and Cirrhosis.      Discharge Course (Issues/Concerns): No real issues or concerns since discharge.  Patient stated his sugars were elevated and noted that patient was not taking insulin correctly.  Patient was inappropriately following the sliding scale.      Objective:   Vitals:    05/06/19 0854   BP: (!) 141/82   Pulse: 91   Resp: 18   Temp: 98.3 °F (36.8 °C)       Met with patient and his caregiver in the clinic to review current medication list.     Current Outpatient Medications   Medication Sig Dispense Refill    acyclovir (ZOVIRAX) 400 MG tablet Take 1 tablet (400 mg total) by mouth 2 (two) times daily. STOP 7/25/19 60 tablet 2    docusate sodium (COLACE) 100 MG capsule Take 1 capsule (100 mg total) by mouth 3 (three) times daily after meals.  0    [START ON 5/7/2019] ergocalciferol (ERGOCALCIFEROL) 50,000 unit Cap Take 1 capsule (50,000 Units total) by mouth every 7 days. elvin 4 capsule 2    famotidine (PEPCID) 20 MG tablet Take 1 tablet (20 mg total) by mouth every evening. 30 tablet 0    furosemide (LASIX) 40 MG tablet Take 1 tablet (40 mg total) by mouth 2 (two) times daily. 60 tablet 11    insulin aspart U-100 (NOVOLOG) 100 unit/mL (3 mL) InPn pen Inject 8 Units into the skin 3 (three) times daily with meals. 15 mL 11    insulin glargine (LANTUS) 100 unit/mL injection Inject 16 Units into the skin every evening.      ketoconazole (NIZORAL) 200 mg Tab Take 0.5 tablets (100 mg total) by mouth once daily. 15 tablet 0    mycophenolate (CELLCEPT) 250 mg Cap Take 4 capsules (1,000 mg total) by mouth 2 (two) times daily. 240 capsule 2    oxyCODONE (ROXICODONE) 10 mg Tab immediate release tablet Take 0.5-1 tablets (5-10 mg total) by mouth every 4 (four) hours as needed. 40 tablet 0    pen needle, diabetic (EASY  "COMFORT PEN NEEDLES) 32 gauge x 5/32" Ndle Inject 1 each into the skin 3 (three) times daily. 100 each 11    predniSONE (DELTASONE) 10 MG tablet Take by mouth daily:  20mg 5/2-5/8, 15mg 5/9-5/15, 10mg 5/16-5/22, 5mg 5/23-5/29, STOP 5/30 40 tablet 0    sulfamethoxazole-trimethoprim 400-80mg (BACTRIM,SEPTRA) 400-80 mg per tablet Take 1 tablet by mouth once daily. STOP 10/24/19 30 tablet 5    tacrolimus (PROGRAF) 1 MG Cap Take 8 capsules (8 mg total) by mouth every 12 (twelve) hours. 480 capsule 11     No current facility-administered medications for this visit.      Facility-Administered Medications Ordered in Other Visits   Medication Dose Route Frequency Provider Last Rate Last Dose    DOXOrubicin with LC beads chemoembolization  76 mg Intra-arterial Once Crystal Gomez DNP        And    DOXOrubicin with LC beads chemoembolization  76 mg Intra-arterial Once Crystal Gomez DNP           Pharmacy Interventions/Recommendations:     1) Graft Function & Immunosuppression Issues:   LFTs are improving.  Patient to  immunos from pharmacy.     2) Opportunistic Infection prophylaxis:   PCP ppx: Bactrim until 10/24/19  CMV ppx: Acyclovir until 7/25/19  Fungal ppx: N/A - on Ketoconazole for prograf boosting.     3) Donor Serologies & Monitoring:     Donor CMV Status: Negative  Donor HCV Status: Negative  Donor HBcAb: Negative  Donor HBV MARIA R: Negative  Donor HCV MARIA R: Negative      4) Pain Management & Bowel Regimen: Pain is tolerable.  Patient trying to avoid taking too many - encouraged to take Tylenol for milder pain. Patient encouraged to start taking laxatives or fiber to help.  He stated that colace did not help and caused more abdominal pain.     5) Blood Pressure Management: BP was initially elevated, but slowly improving - could be correlated to pain.  Continue to monitor, if continues to be elevated can start medication.      6) Blood Sugar Management & Follow-up: Blood sugars have been extremely " elevated.  Patient and wife were incorrectly taking the sliding scale. Advised patient exactly how to take the insulin + sliding scale.  Will prescribe Lantus and Novolog to [facility] to see what the copay is -- patient's wife to call to see copay.  Patient has about 3 weeks of insulin left.  He will start tracking the blood glucoses and how many units he is taking much better and will bring to clinic to evaluate.  Patient does NOT currently have diabetes follow up in the [place] - will need follow up with these elevated sugars.     7) Electrolyte Management: WNL.     8) OTHER medication follow-up (patient assistance, held medications, etc): N/A    9) Reinforced medication education conducted in the hospital, including medication indications, dosing, administration, side effects, monitoring-- including timing of immunosuppressant levels.     Patient received their FIRST fill of medications from [facility].  Discussed the process for obtaining refills of medications, including verifying the dose and mailing address to have medications delivered.     Cesario and his caregivers verbalized understanding and had the opportunity to ask questions.

## 2019-05-06 NOTE — PROGRESS NOTES
"Clinic Note: First Return to Clinic Post-  *** Transplant    Cesario Bailey  is a 53 y.o. male  S/p   LIVER transplant on 4/26/2019 (Liver) for Primary Liver Malignancy: Hepatoma (HCC) and Cirrhosis.      Discharge Course (Issues/Concerns): ***    Objective:   Vitals:    05/06/19 0854   BP: (!) 141/82   Pulse: 91   Resp: 18   Temp: 98.3 °F (36.8 °C)       Met with patient and his caregiver in the clinic to review current medication list.     Current Outpatient Medications   Medication Sig Dispense Refill    acyclovir (ZOVIRAX) 400 MG tablet Take 1 tablet (400 mg total) by mouth 2 (two) times daily. STOP 7/25/19 60 tablet 2    docusate sodium (COLACE) 100 MG capsule Take 1 capsule (100 mg total) by mouth 3 (three) times daily after meals.  0    [START ON 5/7/2019] ergocalciferol (ERGOCALCIFEROL) 50,000 unit Cap Take 1 capsule (50,000 Units total) by mouth every 7 days. elvin 4 capsule 2    famotidine (PEPCID) 20 MG tablet Take 1 tablet (20 mg total) by mouth every evening. 30 tablet 0    furosemide (LASIX) 40 MG tablet Take 1 tablet (40 mg total) by mouth 2 (two) times daily. 60 tablet 11    insulin aspart U-100 (NOVOLOG) 100 unit/mL (3 mL) InPn pen Inject 8 Units into the skin 3 (three) times daily with meals. 15 mL 11    insulin glargine (LANTUS) 100 unit/mL injection Inject 16 Units into the skin every evening.      ketoconazole (NIZORAL) 200 mg Tab Take 0.5 tablets (100 mg total) by mouth once daily. 15 tablet 0    mycophenolate (CELLCEPT) 250 mg Cap Take 4 capsules (1,000 mg total) by mouth 2 (two) times daily. 240 capsule 2    oxyCODONE (ROXICODONE) 10 mg Tab immediate release tablet Take 0.5-1 tablets (5-10 mg total) by mouth every 4 (four) hours as needed. 40 tablet 0    pen needle, diabetic (EASY COMFORT PEN NEEDLES) 32 gauge x 5/32" Ndle Inject 1 each into the skin 3 (three) times daily. 100 each 11    predniSONE (DELTASONE) 10 MG tablet Take by mouth daily:  20mg 5/2-5/8, 15mg 5/9-5/15, 10mg " 5/16-5/22, 5mg 5/23-5/29, STOP 5/30 40 tablet 0    sulfamethoxazole-trimethoprim 400-80mg (BACTRIM,SEPTRA) 400-80 mg per tablet Take 1 tablet by mouth once daily. STOP 10/24/19 30 tablet 5    tacrolimus (PROGRAF) 1 MG Cap Take 8 capsules (8 mg total) by mouth every 12 (twelve) hours. 480 capsule 11     No current facility-administered medications for this visit.      Facility-Administered Medications Ordered in Other Visits   Medication Dose Route Frequency Provider Last Rate Last Dose    DOXOrubicin with LC beads chemoembolization  76 mg Intra-arterial Once Crystal Gomez DNP        And    DOXOrubicin with LC beads chemoembolization  76 mg Intra-arterial Once Crystal Gomez DNP           Pharmacy Interventions/Recommendations:     1) Graft Function & Immunosuppression Issues:     2) Opportunistic Infection prophylaxis:   PCP ppx: ***  CMV ppx: ***  Fungal ppx: ***    3) Donor Serologies & Monitoring:     Donor CMV Status: Negative  Donor HCV Status: Negative  Donor HBcAb: Negative  Donor HBV MARIA R: Negative  Donor HCV MARIA R: Negative      4) Pain Management & Bowel Regimen: ***    5) Blood Pressure Management: ***    6) Blood Sugar Management & Follow-up: ***    7) Electrolyte Management: ***    8) OTHER medication follow-up (patient assistance, held medications, etc): ***    9) Reinforced medication education conducted in the hospital, including medication indications, dosing, administration, side effects, monitoring-- including timing of immunosuppressant levels.     Patient received their FIRST fill of medications from ***.  Discussed the process for obtaining refills of medications, including verifying the dose and mailing address to have medications delivered.     Cesario and his caregivers verbalized understanding and had the opportunity to ask questions.

## 2019-05-06 NOTE — PROGRESS NOTES
1ST NURSING VISIT POST DISCHARGE NOTE    1st RN appointment with Cesario Bailey post discharge 5/4 s/p liver transplant 4/26/19.  Patient's spouse accompanied him along with [place] speaking .  Upon assessment, patient complains of insomnia.  Incision intact with staples.  Patient that he is able to explain daily incision care and showering instructions.  Medication list and rationale were reviewed.  Patient states  did bring blue medication card and medication bottles for review.  Self-assessment reviewed with patient and spouse; time allowed for questions.  Patient expressed understanding of daily care including BID VS and medications.  Patient aware that nurse will review today's labs with a transplant physician and call with any does changes indicated.

## 2019-05-07 ENCOUNTER — OFFICE VISIT (OUTPATIENT)
Dept: TRANSPLANT | Facility: CLINIC | Age: 54
End: 2019-05-07
Payer: COMMERCIAL

## 2019-05-07 VITALS
RESPIRATION RATE: 20 BRPM | HEART RATE: 86 BPM | WEIGHT: 184.06 LBS | TEMPERATURE: 99 F | OXYGEN SATURATION: 99 % | DIASTOLIC BLOOD PRESSURE: 61 MMHG | SYSTOLIC BLOOD PRESSURE: 133 MMHG | HEIGHT: 66 IN | BODY MASS INDEX: 29.58 KG/M2

## 2019-05-07 DIAGNOSIS — Z51.81 ENCOUNTER FOR THERAPEUTIC DRUG MONITORING: Primary | ICD-10-CM

## 2019-05-07 DIAGNOSIS — Z94.4 LIVER REPLACED BY TRANSPLANT: ICD-10-CM

## 2019-05-07 PROCEDURE — 99999 PR PBB SHADOW E&M-EST. PATIENT-LVL III: ICD-10-PCS | Mod: PBBFAC,,,

## 2019-05-07 PROCEDURE — 99214 OFFICE O/P EST MOD 30 MIN: CPT | Mod: 24,S$GLB,, | Performed by: TRANSPLANT SURGERY

## 2019-05-07 PROCEDURE — 99999 PR PBB SHADOW E&M-EST. PATIENT-LVL III: CPT | Mod: PBBFAC,,,

## 2019-05-07 PROCEDURE — 99214 PR OFFICE/OUTPT VISIT, EST, LEVL IV, 30-39 MIN: ICD-10-PCS | Mod: 24,S$GLB,, | Performed by: TRANSPLANT SURGERY

## 2019-05-07 RX ORDER — ASPIRIN 81 MG/1
81 TABLET ORAL DAILY
Refills: 0 | COMMUNITY
Start: 2019-05-07

## 2019-05-07 NOTE — PROGRESS NOTES
Transplant Surgery  Liver Transplant Recipient Follow-up    Original Referring Physician: Saskia Dc  Current Corresponding Physician:     Chief Complaint: Cesario is here for follow up of his liver transplant performed 2019 for the primary diagnosis (UNOS) of Primary Liver Malignancy: Hepatoma (HCC) and Cirrhosis    ORGAN: LIVER  Whole or Partial: whole liver  Donor Type:  - brain death  PHS Increased Risk: no  Donor CMV Status: Negative  Donor HCV Status: Negative  Donor HBcAb: Negative  Donor HBV MARIA R: Negative  Donor HCV MARAI R: Negative    Biliary Anastomosis: end to end  Arterial Anatomy: standard  IVC reconstruction: end to end ivc  Portal vein status: partial patent    Subjective:     History of Present Illness: He has had the following complications since transplant: none.  The noted complications are well controlled.    Interval History: Currently, he is doing well.  Current complaints include none.  Cesario is here for management of his immunosuppression medication.    Review of Systems   Constitutional: Negative for activity change and appetite change.   HENT: Negative for congestion and tinnitus.    Eyes: Negative for redness and visual disturbance.   Respiratory: Negative for cough and shortness of breath.    Cardiovascular: Negative for chest pain and palpitations.   Gastrointestinal: Positive for abdominal distention. Negative for abdominal pain.   Endocrine: Negative for polydipsia and polyuria.   Genitourinary: Negative for decreased urine volume and dysuria.   Musculoskeletal: Negative for arthralgias and back pain.   Skin: Negative for pallor and rash.   Allergic/Immunologic: Positive for immunocompromised state. Negative for environmental allergies.   Neurological: Negative for tremors and headaches.   Hematological: Negative for adenopathy. Does not bruise/bleed easily.   Psychiatric/Behavioral: Negative for behavioral problems and confusion.       Objective:     Physical Exam    Constitutional: He is oriented to person, place, and time. He appears well-developed and well-nourished. No distress.   HENT:   Head: Normocephalic.   Eyes: No scleral icterus.   Neck: Normal range of motion. Neck supple. No JVD present.   Cardiovascular: Normal rate, regular rhythm and intact distal pulses.   Pulmonary/Chest: Effort normal. No respiratory distress.   Abdominal: Soft. He exhibits no mass. There is no rebound and no guarding.       Musculoskeletal: Normal range of motion.   Neurological: He is alert and oriented to person, place, and time.   Skin: Skin is warm and dry. He is not diaphoretic.   Psychiatric: He has a normal mood and affect. His behavior is normal. Judgment and thought content normal.     Lab Results   Component Value Date    BILITOT 1.0 05/06/2019    AST 16 05/06/2019    ALT 82 (H) 05/06/2019    ALKPHOS 79 05/06/2019    CREATININE 1.1 05/06/2019    ALBUMIN 2.8 (L) 05/06/2019     Lab Results   Component Value Date    WBC 5.73 05/06/2019    HGB 8.6 (L) 05/06/2019    HCT 26.0 (L) 05/06/2019    HCT 24 (L) 04/27/2019     05/06/2019     Lab Results   Component Value Date    TACROLIMUS 8.9 05/06/2019       Assessment/Plan:          · S/P liver transplant.  · Chronic immunosuppressive medications for rejection prophylaxis at target.  Plan: no adjustment needed.  · Continue monitoring symptoms, labs and drug levels for drug-related toxicity and side effects.  · Incision: staples in place; wound clean, dry, and intact  · Femoral arterial line site: no complications evident    Vincent Aquino MD       [facility] Patient Status  Functional Status: 70% - Cares for self: unable to carry on normal activity or active work  Physical Capacity: No Limitations

## 2019-05-09 ENCOUNTER — TELEPHONE (OUTPATIENT)
Dept: TRANSPLANT | Facility: CLINIC | Age: 54
End: 2019-05-09

## 2019-05-09 ENCOUNTER — HOSPITAL ENCOUNTER (OUTPATIENT)
Dept: RADIOLOGY | Facility: HOSPITAL | Age: 54
Discharge: HOME | End: 2019-05-09
Attending: SURGERY
Payer: COMMERCIAL

## 2019-05-09 ENCOUNTER — CONFERENCE (OUTPATIENT)
Dept: TRANSPLANT | Facility: CLINIC | Age: 54
End: 2019-05-09

## 2019-05-09 DIAGNOSIS — Z94.4 LIVER REPLACED BY TRANSPLANT: ICD-10-CM

## 2019-05-09 PROCEDURE — 76705 ECHO EXAM OF ABDOMEN: CPT | Mod: TC

## 2019-05-09 PROCEDURE — 76705 US LIVER TRANSPLANT POST: ICD-10-PCS | Mod: 26,59,, | Performed by: RADIOLOGY

## 2019-05-09 PROCEDURE — 93975 VASCULAR STUDY: CPT | Mod: 26,,, | Performed by: RADIOLOGY

## 2019-05-09 PROCEDURE — 93975 VASCULAR STUDY: CPT | Mod: TC

## 2019-05-09 PROCEDURE — 93975 US LIVER TRANSPLANT POST: ICD-10-PCS | Mod: 26,,, | Performed by: RADIOLOGY

## 2019-05-09 PROCEDURE — 76705 ECHO EXAM OF ABDOMEN: CPT | Mod: 26,59,, | Performed by: RADIOLOGY

## 2019-05-09 NOTE — TELEPHONE ENCOUNTER
Rodolfo Arambula with International department labs and liver ultrasound reviewed with Dr. Aquino. LFTs stable, instructed to d/c ketoconazole and repeat labs on Monday 5/13. She will notify pt.

## 2019-05-09 NOTE — TELEPHONE ENCOUNTER
Liver Pathology Conference:    Liver Explant: ETOH/ HCC : 1 tumor; 1 cm. Rt. Lobe: mod. Diff; no vasc. Invasion.   Stage II

## 2019-05-11 NOTE — PROGRESS NOTES
Subjective:      Patient ID: Cesario Bailey is a 53 y.o. male.    Chief Complaint:  Hospital Follow Up      History of Present Illness  This is a follow up visit after recent hospitalization  Endocrinology was consulted for management of hyperglycemia: following liver transplant     Discharge date: 5/4/19  Discharge regimen specific to hyperglycemia: Lantus 16 units HS  Humalog 8 units TIDWM  And correction scale 150/50 +1  Glucocorticoid: prednisone  15 mg daily; standard steroid taper. Will taper to 10 mg daily on 16th.   Date of transplant? : Liver Transplant 4/26/19    Was able to fill prescription for medications and supplies - have all of supplies. Concerned with future - novolog will be too expensive when he runs out (has a few months supply per patient)    Etiology of the hyperglycemia:  known T2DM      Diabetes was diagnosed: > 5 years ago  Known complications:  None      Current regimen: Lantus 16 units HS  Humalog 8 units TIDWM  And correction scale     Missed doses?  no    Prior medications not tolerated or Failed treatments: Humulin 20 units TID with meals, Lantus 20-25 units q HS       # times a day testing 4 times a day   Log reviewed: yes - see picture        Hypoglycemic event? None   If yes, needed assistance? N/a      Typical meals:   Breakfast: pancakes, coffee, bread, ham, cheese, eggs   Lunch: rice and beans, chicken  Dinner: similar to lunch      Education - last visit: none - will refer    No Polyuria polydipsia nocturia or vision changes    With regards to reason for admit:  Doing better       Review of Systems   Constitutional: Positive for appetite change.   Gastrointestinal: Negative for nausea.       Objective:   Physical Exam   Skin:   Injection sites are ok. One small nodule noted; reviewed site rotation and avoidance of these areas.      Vitals reviewed.      Body mass index is 27.4 kg/m².    Lab Review:   Lab Results   Component Value Date    HGBA1C 6.9 (H) 04/26/2019     No  results found for: CHOL, HDL, LDLCALC, TRIG, CHOLHDL  Lab Results   Component Value Date     05/09/2019    K 4.9 05/09/2019     05/09/2019    CO2 25 05/09/2019     (H) 05/09/2019    BUN 13 05/09/2019    CREATININE 1.2 05/09/2019    CALCIUM 8.9 05/09/2019    PROT 5.6 (L) 05/09/2019    ALBUMIN 3.1 (L) 05/09/2019    BILITOT 0.7 05/09/2019    ALKPHOS 68 05/09/2019    AST 11 05/09/2019    ALT 47 (H) 05/09/2019    ANIONGAP 9 05/09/2019    ESTGFRAFRICA >60.0 05/09/2019    EGFRNONAA >60.0 05/09/2019    TSH 1.066 07/16/2018       Assessment and Plan     Problem List Items Addressed This Visit        Endocrine    Type 2 diabetes mellitus with hyperglycemia    Current Assessment & Plan     Reviewed goals of therapy are to get the best control we can without hypoglycemia    Refer to education    Medication changes:   Continue Levemir 16 units daily; FBG reasonably well controlled.  Increase novolog 10 units with meals plus change to 180/25 +2 correction scale.     Reviewed patient's current insulin regimen. Clarified proper insulin dose and timing in relation to meals, etc. Insulin injection sites and proper rotation instructed.       -Advised frequent self blood glucose monitoring.  Patient encouraged to document glucose results and bring them to every clinic visit      -Hypoglycemia precautions discussed. Instructed on precautions before driving.      -Close adherence to lifestyle changes recommended.        Send logs in ~ 1 week. Follow up with education and with any provider in 1 month (steroids will be tapered to off).                      GI    Liver transplanted    Current Assessment & Plan     Managed per LTS.   avoid hypoglycemia              Other    Prophylactic immunotherapy    Current Assessment & Plan     May increase insulin resistance.            Adverse effect of corticosteroids    Current Assessment & Plan     Glucocorticoids markedly increase prandial glucoses. Expect the steroid taper will  help glucose control.

## 2019-05-11 NOTE — ASSESSMENT & PLAN NOTE
Glucocorticoids markedly increase prandial glucoses. Expect the steroid taper will help glucose control.

## 2019-05-11 NOTE — ASSESSMENT & PLAN NOTE
Reviewed goals of therapy are to get the best control we can without hypoglycemia    Refer to education    Medication changes:   Continue Levemir 16 units daily; FBG reasonably well controlled.  Increase novolog 10 units with meals plus change to 180/25 +2 correction scale.     Reviewed patient's current insulin regimen. Clarified proper insulin dose and timing in relation to meals, etc. Insulin injection sites and proper rotation instructed.       -Advised frequent self blood glucose monitoring.  Patient encouraged to document glucose results and bring them to every clinic visit      -Hypoglycemia precautions discussed. Instructed on precautions before driving.      -Close adherence to lifestyle changes recommended.        Send logs in ~ 1 week. Follow up with education and with any provider in 1 month (steroids will be tapered to off).

## 2019-05-13 ENCOUNTER — LAB VISIT (OUTPATIENT)
Dept: LAB | Facility: HOSPITAL | Age: 54
End: 2019-05-13
Attending: SURGERY
Payer: COMMERCIAL

## 2019-05-13 ENCOUNTER — OFFICE VISIT (OUTPATIENT)
Dept: ENDOCRINOLOGY | Facility: CLINIC | Age: 54
End: 2019-05-13
Payer: COMMERCIAL

## 2019-05-13 ENCOUNTER — TELEPHONE (OUTPATIENT)
Dept: TRANSPLANT | Facility: CLINIC | Age: 54
End: 2019-05-13

## 2019-05-13 ENCOUNTER — TELEPHONE (OUTPATIENT)
Dept: ENDOCRINOLOGY | Facility: CLINIC | Age: 54
End: 2019-05-13

## 2019-05-13 VITALS
SYSTOLIC BLOOD PRESSURE: 130 MMHG | WEIGHT: 169.75 LBS | DIASTOLIC BLOOD PRESSURE: 60 MMHG | RESPIRATION RATE: 16 BRPM | HEIGHT: 66 IN | BODY MASS INDEX: 27.28 KG/M2

## 2019-05-13 DIAGNOSIS — Z94.4 LIVER REPLACED BY TRANSPLANT: ICD-10-CM

## 2019-05-13 DIAGNOSIS — Z94.4 LIVER TRANSPLANTED: ICD-10-CM

## 2019-05-13 DIAGNOSIS — E11.65 TYPE 2 DIABETES MELLITUS WITH HYPERGLYCEMIA, WITH LONG-TERM CURRENT USE OF INSULIN: Primary | ICD-10-CM

## 2019-05-13 DIAGNOSIS — Z79.4 TYPE 2 DIABETES MELLITUS WITH HYPERGLYCEMIA, WITH LONG-TERM CURRENT USE OF INSULIN: Primary | ICD-10-CM

## 2019-05-13 DIAGNOSIS — Z29.89 PROPHYLACTIC IMMUNOTHERAPY: ICD-10-CM

## 2019-05-13 DIAGNOSIS — T38.0X5D ADVERSE EFFECT OF CORTICOSTEROIDS, SUBSEQUENT ENCOUNTER: ICD-10-CM

## 2019-05-13 LAB
ALBUMIN SERPL BCP-MCNC: 3.4 G/DL (ref 3.5–5.2)
ALP SERPL-CCNC: 63 U/L (ref 55–135)
ALT SERPL W/O P-5'-P-CCNC: 23 U/L (ref 10–44)
ANION GAP SERPL CALC-SCNC: 9 MMOL/L (ref 8–16)
AST SERPL-CCNC: 10 U/L (ref 10–40)
BASOPHILS # BLD AUTO: 0.04 K/UL (ref 0–0.2)
BASOPHILS NFR BLD: 0.9 % (ref 0–1.9)
BILIRUB DIRECT SERPL-MCNC: 0.4 MG/DL (ref 0.1–0.3)
BILIRUB SERPL-MCNC: 0.8 MG/DL (ref 0.1–1)
BUN SERPL-MCNC: 12 MG/DL (ref 6–20)
CALCIUM SERPL-MCNC: 9.5 MG/DL (ref 8.7–10.5)
CHLORIDE SERPL-SCNC: 101 MMOL/L (ref 95–110)
CO2 SERPL-SCNC: 27 MMOL/L (ref 23–29)
CREAT SERPL-MCNC: 1.2 MG/DL (ref 0.5–1.4)
DIFFERENTIAL METHOD: ABNORMAL
EOSINOPHIL # BLD AUTO: 0.3 K/UL (ref 0–0.5)
EOSINOPHIL NFR BLD: 7 % (ref 0–8)
ERYTHROCYTE [DISTWIDTH] IN BLOOD BY AUTOMATED COUNT: 17.2 % (ref 11.5–14.5)
EST. GFR  (AFRICAN AMERICAN): >60 ML/MIN/1.73 M^2
EST. GFR  (NON AFRICAN AMERICAN): >60 ML/MIN/1.73 M^2
GLUCOSE SERPL-MCNC: 130 MG/DL (ref 70–110)
HCT VFR BLD AUTO: 31.6 % (ref 40–54)
HGB BLD-MCNC: 10.2 G/DL (ref 14–18)
IMM GRANULOCYTES # BLD AUTO: 0.02 K/UL (ref 0–0.04)
IMM GRANULOCYTES NFR BLD AUTO: 0.4 % (ref 0–0.5)
LYMPHOCYTES # BLD AUTO: 0.6 K/UL (ref 1–4.8)
LYMPHOCYTES NFR BLD: 13.4 % (ref 18–48)
MCH RBC QN AUTO: 31.1 PG (ref 27–31)
MCHC RBC AUTO-ENTMCNC: 32.3 G/DL (ref 32–36)
MCV RBC AUTO: 96 FL (ref 82–98)
MONOCYTES # BLD AUTO: 0.5 K/UL (ref 0.3–1)
MONOCYTES NFR BLD: 11.8 % (ref 4–15)
NEUTROPHILS # BLD AUTO: 3 K/UL (ref 1.8–7.7)
NEUTROPHILS NFR BLD: 66.5 % (ref 38–73)
NRBC BLD-RTO: 0 /100 WBC
PLATELET # BLD AUTO: 244 K/UL (ref 150–350)
PMV BLD AUTO: 9.3 FL (ref 9.2–12.9)
POTASSIUM SERPL-SCNC: 3.9 MMOL/L (ref 3.5–5.1)
PROT SERPL-MCNC: 6.1 G/DL (ref 6–8.4)
RBC # BLD AUTO: 3.28 M/UL (ref 4.6–6.2)
SODIUM SERPL-SCNC: 137 MMOL/L (ref 136–145)
TACROLIMUS BLD-MCNC: 8.1 NG/ML (ref 5–15)
WBC # BLD AUTO: 4.56 K/UL (ref 3.9–12.7)

## 2019-05-13 PROCEDURE — 85025 COMPLETE CBC W/AUTO DIFF WBC: CPT

## 2019-05-13 PROCEDURE — 36415 COLL VENOUS BLD VENIPUNCTURE: CPT

## 2019-05-13 PROCEDURE — 99214 PR OFFICE/OUTPT VISIT, EST, LEVL IV, 30-39 MIN: ICD-10-PCS | Mod: S$GLB,,, | Performed by: NURSE PRACTITIONER

## 2019-05-13 PROCEDURE — 99214 OFFICE O/P EST MOD 30 MIN: CPT | Mod: S$GLB,,, | Performed by: NURSE PRACTITIONER

## 2019-05-13 PROCEDURE — 99999 PR PBB SHADOW E&M-EST. PATIENT-LVL III: ICD-10-PCS | Mod: PBBFAC,,,

## 2019-05-13 PROCEDURE — 99999 PR PBB SHADOW E&M-EST. PATIENT-LVL III: CPT | Mod: PBBFAC,,,

## 2019-05-13 PROCEDURE — 80197 ASSAY OF TACROLIMUS: CPT

## 2019-05-13 PROCEDURE — 80053 COMPREHEN METABOLIC PANEL: CPT

## 2019-05-13 PROCEDURE — 82248 BILIRUBIN DIRECT: CPT

## 2019-05-13 NOTE — TELEPHONE ENCOUNTER
Informed Tyesha with International department labs reviewed with Dr. Romeo and are stable, no medication changes are needed. Pt to be seen in transplant clinic tomorrow afternoon. She will inform pt.

## 2019-05-14 ENCOUNTER — OFFICE VISIT (OUTPATIENT)
Dept: TRANSPLANT | Facility: CLINIC | Age: 54
End: 2019-05-14
Payer: COMMERCIAL

## 2019-05-14 VITALS
TEMPERATURE: 98 F | HEIGHT: 67 IN | RESPIRATION RATE: 18 BRPM | WEIGHT: 171.5 LBS | DIASTOLIC BLOOD PRESSURE: 74 MMHG | HEART RATE: 82 BPM | BODY MASS INDEX: 26.92 KG/M2 | SYSTOLIC BLOOD PRESSURE: 146 MMHG | OXYGEN SATURATION: 99 %

## 2019-05-14 DIAGNOSIS — Z51.81 ENCOUNTER FOR THERAPEUTIC DRUG MONITORING: Primary | ICD-10-CM

## 2019-05-14 DIAGNOSIS — Z94.4 LIVER REPLACED BY TRANSPLANT: ICD-10-CM

## 2019-05-14 PROCEDURE — 99999 PR PBB SHADOW E&M-EST. PATIENT-LVL III: CPT | Mod: PBBFAC,,,

## 2019-05-14 PROCEDURE — 99213 OFFICE O/P EST LOW 20 MIN: CPT | Mod: 24,S$GLB,, | Performed by: TRANSPLANT SURGERY

## 2019-05-14 PROCEDURE — 99213 PR OFFICE/OUTPT VISIT, EST, LEVL III, 20-29 MIN: ICD-10-PCS | Mod: 24,S$GLB,, | Performed by: TRANSPLANT SURGERY

## 2019-05-14 PROCEDURE — 99999 PR PBB SHADOW E&M-EST. PATIENT-LVL III: ICD-10-PCS | Mod: PBBFAC,,,

## 2019-05-14 RX ORDER — FAMOTIDINE 20 MG/1
20 TABLET, FILM COATED ORAL NIGHTLY
Qty: 30 TABLET | Refills: 0 | Status: SHIPPED | OUTPATIENT
Start: 2019-05-14 | End: 2019-05-29 | Stop reason: SDUPTHER

## 2019-05-14 NOTE — PROGRESS NOTES
STAPLE REMOVAL NOTE    Staples removed from liver transplant incision, steri-strips applied with Benzoin per Dr. Romeo's order.  Patient tolerated procedure well.  Skin dry and intact.  Patient instructed to shower with back to water spray and to pat dry incision and to let the steri-strips wear off on their own.  Patient instructed to report any redness, warmth, or drainage from incision to transplant coordinators.  All questions answered.

## 2019-05-14 NOTE — PROGRESS NOTES
Transplant Surgery  Liver Transplant Recipient Follow-up    Original Referring Physician: Saskia Dc  Current Corresponding Physician:     Chief Complaint: Cesario is here for follow up of his liver transplant performed 2019 for the primary diagnosis (UNOS) of Primary Liver Malignancy: Hepatoma (HCC) and Cirrhosis    ORGAN: LIVER  Whole or Partial: whole liver  Donor Type:  - brain death  PHS Increased Risk: no  Donor CMV Status: Negative  Donor HCV Status: Negative  Donor HBcAb: Negative  Donor HBV MARIA R: Negative  Donor HCV MARIA R: Negative    Biliary Anastomosis: end to end  Arterial Anatomy: standard  IVC reconstruction: end to end ivc  Portal vein status: partial patent    Subjective:     History of Present Illness: He has had the following complications since transplant: none.   Interval History: Currently, he is doing well.  Current complaints include none.  Cesario is here for management of his immunosuppression medication.    Review of Systems   Constitutional: Negative.    Respiratory: Negative.    Cardiovascular: Negative.    Gastrointestinal: Negative.    All other systems reviewed and are negative.      Objective:     Physical Exam   Constitutional: He appears well-developed and well-nourished.   Pulmonary/Chest: Effort normal.   Abdominal: Soft. Bowel sounds are normal.       Vitals reviewed.    Lab Results   Component Value Date    BILITOT 0.8 2019    AST 10 2019    ALT 23 2019    ALKPHOS 63 2019    CREATININE 1.2 2019    ALBUMIN 3.4 (L) 2019     Lab Results   Component Value Date    WBC 4.56 2019    HGB 10.2 (L) 2019    HCT 31.6 (L) 2019    HCT 24 (L) 2019     2019     Lab Results   Component Value Date    TACROLIMUS 8.1 2019       Assessment/Plan:          · S/P liver transplant. Good allograft function   · Chronic immunosuppressive medications for rejection prophylaxis at target.  Plan: no adjustment  needed.  · Continue monitoring symptoms, labs and drug levels for drug-related toxicity and side effects.  · Incision: staples in place; wound clean, dry, and intact. Remove staples  · Femoral arterial line site: no complications evident   · DC MD BELGICA Pineda Patient Status  Functional Status: 50% - Requires considerable assistance and frequent medical care  Physical Capacity: No Limitations

## 2019-05-16 ENCOUNTER — TELEPHONE (OUTPATIENT)
Dept: TRANSPLANT | Facility: CLINIC | Age: 54
End: 2019-05-16

## 2019-05-16 ENCOUNTER — HOSPITAL ENCOUNTER (OUTPATIENT)
Dept: RADIOLOGY | Facility: HOSPITAL | Age: 54
Discharge: HOME | End: 2019-05-16
Attending: SURGERY
Payer: COMMERCIAL

## 2019-05-16 DIAGNOSIS — Z94.4 LIVER REPLACED BY TRANSPLANT: ICD-10-CM

## 2019-05-16 PROCEDURE — 93975 VASCULAR STUDY: CPT | Mod: TC

## 2019-05-16 PROCEDURE — 93975 VASCULAR STUDY: CPT | Mod: 26,,, | Performed by: RADIOLOGY

## 2019-05-16 PROCEDURE — 76705 US LIVER TRANSPLANT POST: ICD-10-PCS | Mod: 26,59,, | Performed by: RADIOLOGY

## 2019-05-16 PROCEDURE — 93975 US LIVER TRANSPLANT POST: ICD-10-PCS | Mod: 26,,, | Performed by: RADIOLOGY

## 2019-05-16 PROCEDURE — 76705 ECHO EXAM OF ABDOMEN: CPT | Mod: TC

## 2019-05-16 PROCEDURE — 76705 ECHO EXAM OF ABDOMEN: CPT | Mod: 26,59,, | Performed by: RADIOLOGY

## 2019-05-16 NOTE — TELEPHONE ENCOUNTER
Informed Tyesha with International Department liver ultrasound reviewed and is stable. Will repeat next week.

## 2019-05-19 RX ORDER — SULFAMETHOXAZOLE AND TRIMETHOPRIM 400; 80 MG/1; MG/1
1 TABLET ORAL DAILY
Qty: 30 TABLET | Refills: 5 | Status: SHIPPED | OUTPATIENT
Start: 2019-05-19 | End: 2019-06-03 | Stop reason: SDUPTHER

## 2019-05-20 ENCOUNTER — TELEPHONE (OUTPATIENT)
Dept: TRANSPLANT | Facility: CLINIC | Age: 54
End: 2019-05-20

## 2019-05-20 ENCOUNTER — LAB VISIT (OUTPATIENT)
Dept: LAB | Facility: HOSPITAL | Age: 54
End: 2019-05-20
Attending: SURGERY
Payer: COMMERCIAL

## 2019-05-20 DIAGNOSIS — Z94.4 LIVER REPLACED BY TRANSPLANT: ICD-10-CM

## 2019-05-20 LAB
ALBUMIN SERPL BCP-MCNC: 3.8 G/DL (ref 3.5–5.2)
ALP SERPL-CCNC: 61 U/L (ref 55–135)
ALT SERPL W/O P-5'-P-CCNC: 15 U/L (ref 10–44)
ANION GAP SERPL CALC-SCNC: 5 MMOL/L (ref 8–16)
AST SERPL-CCNC: 14 U/L (ref 10–40)
BASOPHILS # BLD AUTO: 0.02 K/UL (ref 0–0.2)
BASOPHILS NFR BLD: 0.5 % (ref 0–1.9)
BILIRUB DIRECT SERPL-MCNC: 0.3 MG/DL (ref 0.1–0.3)
BILIRUB SERPL-MCNC: 0.6 MG/DL (ref 0.1–1)
BUN SERPL-MCNC: 9 MG/DL (ref 6–20)
CALCIUM SERPL-MCNC: 10.1 MG/DL (ref 8.7–10.5)
CHLORIDE SERPL-SCNC: 107 MMOL/L (ref 95–110)
CO2 SERPL-SCNC: 27 MMOL/L (ref 23–29)
CREAT SERPL-MCNC: 1.1 MG/DL (ref 0.5–1.4)
DIFFERENTIAL METHOD: ABNORMAL
EOSINOPHIL # BLD AUTO: 0.3 K/UL (ref 0–0.5)
EOSINOPHIL NFR BLD: 7.4 % (ref 0–8)
ERYTHROCYTE [DISTWIDTH] IN BLOOD BY AUTOMATED COUNT: 16 % (ref 11.5–14.5)
EST. GFR  (AFRICAN AMERICAN): >60 ML/MIN/1.73 M^2
EST. GFR  (NON AFRICAN AMERICAN): >60 ML/MIN/1.73 M^2
GLUCOSE SERPL-MCNC: 110 MG/DL (ref 70–110)
HCT VFR BLD AUTO: 33.8 % (ref 40–54)
HGB BLD-MCNC: 10.5 G/DL (ref 14–18)
IMM GRANULOCYTES # BLD AUTO: 0.01 K/UL (ref 0–0.04)
IMM GRANULOCYTES NFR BLD AUTO: 0.3 % (ref 0–0.5)
LYMPHOCYTES # BLD AUTO: 0.6 K/UL (ref 1–4.8)
LYMPHOCYTES NFR BLD: 15.9 % (ref 18–48)
MCH RBC QN AUTO: 29.8 PG (ref 27–31)
MCHC RBC AUTO-ENTMCNC: 31.1 G/DL (ref 32–36)
MCV RBC AUTO: 96 FL (ref 82–98)
MONOCYTES # BLD AUTO: 0.4 K/UL (ref 0.3–1)
MONOCYTES NFR BLD: 9 % (ref 4–15)
NEUTROPHILS # BLD AUTO: 2.6 K/UL (ref 1.8–7.7)
NEUTROPHILS NFR BLD: 66.9 % (ref 38–73)
NRBC BLD-RTO: 0 /100 WBC
PLATELET # BLD AUTO: 154 K/UL (ref 150–350)
PMV BLD AUTO: 9.9 FL (ref 9.2–12.9)
POTASSIUM SERPL-SCNC: 4.8 MMOL/L (ref 3.5–5.1)
PROT SERPL-MCNC: 6.3 G/DL (ref 6–8.4)
RBC # BLD AUTO: 3.52 M/UL (ref 4.6–6.2)
SODIUM SERPL-SCNC: 139 MMOL/L (ref 136–145)
TACROLIMUS BLD-MCNC: 7.1 NG/ML (ref 5–15)
WBC # BLD AUTO: 3.9 K/UL (ref 3.9–12.7)

## 2019-05-20 PROCEDURE — 82248 BILIRUBIN DIRECT: CPT

## 2019-05-20 PROCEDURE — 85025 COMPLETE CBC W/AUTO DIFF WBC: CPT

## 2019-05-20 PROCEDURE — 80053 COMPREHEN METABOLIC PANEL: CPT

## 2019-05-20 PROCEDURE — 80197 ASSAY OF TACROLIMUS: CPT

## 2019-05-20 PROCEDURE — 36415 COLL VENOUS BLD VENIPUNCTURE: CPT

## 2019-05-20 NOTE — TELEPHONE ENCOUNTER
Rodolfo Arambula with International department labs reviewed and are stable, no med changes needed. Will repeat labs Monday 5/27. She will inform pt.

## 2019-05-20 NOTE — TELEPHONE ENCOUNTER
----- Message from Kostas Levy MD sent at 5/20/2019  2:52 PM CDT -----  Results ok. No action needed

## 2019-05-23 ENCOUNTER — TELEPHONE (OUTPATIENT)
Dept: TRANSPLANT | Facility: CLINIC | Age: 54
End: 2019-05-23

## 2019-05-23 ENCOUNTER — HOSPITAL ENCOUNTER (OUTPATIENT)
Dept: RADIOLOGY | Facility: HOSPITAL | Age: 54
Discharge: HOME | End: 2019-05-23
Attending: SURGERY
Payer: COMMERCIAL

## 2019-05-23 DIAGNOSIS — Z94.4 LIVER REPLACED BY TRANSPLANT: ICD-10-CM

## 2019-05-23 PROCEDURE — 93975 VASCULAR STUDY: CPT | Mod: 26,GC,, | Performed by: RADIOLOGY

## 2019-05-23 PROCEDURE — 93975 VASCULAR STUDY: CPT | Mod: TC

## 2019-05-23 PROCEDURE — 76705 ECHO EXAM OF ABDOMEN: CPT | Mod: 26,59,GC, | Performed by: RADIOLOGY

## 2019-05-23 PROCEDURE — 76705 US LIVER TRANSPLANT POST: ICD-10-PCS | Mod: 26,59,GC, | Performed by: RADIOLOGY

## 2019-05-23 PROCEDURE — 93975 US LIVER TRANSPLANT POST: ICD-10-PCS | Mod: 26,GC,, | Performed by: RADIOLOGY

## 2019-05-23 PROCEDURE — 76705 ECHO EXAM OF ABDOMEN: CPT | Mod: TC

## 2019-05-23 RX ORDER — ERGOCALCIFEROL 1.25 MG/1
50000 CAPSULE ORAL
Qty: 4 CAPSULE | Refills: 2 | Status: SHIPPED | OUTPATIENT
Start: 2019-05-23

## 2019-05-23 NOTE — TELEPHONE ENCOUNTER
Informed Tyesha with International department liver ultrasound reviewed with Dr. Levy and is stable, will repeat in 1 month. She will notify pt.

## 2019-05-23 NOTE — TELEPHONE ENCOUNTER
----- Message from Kostas Levy MD sent at 5/23/2019  2:23 PM CDT -----  Results ok. No action needed

## 2019-05-24 ENCOUNTER — TELEPHONE (OUTPATIENT)
Dept: ENDOCRINOLOGY | Facility: HOSPITAL | Age: 54
End: 2019-05-24

## 2019-05-24 NOTE — TELEPHONE ENCOUNTER
BG logs brought to clinic per request at last visit. International office utilized for interpretor.  Hypoglycemia with Lantus 16 units HS once steroids tapered. Patient taking 8-12 units of Lantus - unable to clearing define how he determines dosing. Recommended to decrease to Lantus 8 unit HS. Continue novolog 10 units with meals plus correction scale 180/25 while on prednisone 5 mg. Decrease to novolog 8 units with meals when tapered off of steroids. Bring BG logs on 6/3 and follow up with DM education and MANJEET Reynolds in June. Patient to call if BG trend up or  With any hypoglycemia.

## 2019-05-27 ENCOUNTER — LAB VISIT (OUTPATIENT)
Dept: LAB | Facility: HOSPITAL | Age: 54
End: 2019-05-27
Attending: SURGERY
Payer: COMMERCIAL

## 2019-05-27 ENCOUNTER — TELEPHONE (OUTPATIENT)
Dept: TRANSPLANT | Facility: CLINIC | Age: 54
End: 2019-05-27

## 2019-05-27 DIAGNOSIS — Z94.4 LIVER REPLACED BY TRANSPLANT: Primary | ICD-10-CM

## 2019-05-27 DIAGNOSIS — Z94.4 LIVER REPLACED BY TRANSPLANT: ICD-10-CM

## 2019-05-27 LAB
ALBUMIN SERPL BCP-MCNC: 3.9 G/DL (ref 3.5–5.2)
ALP SERPL-CCNC: 54 U/L (ref 55–135)
ALT SERPL W/O P-5'-P-CCNC: 14 U/L (ref 10–44)
ANION GAP SERPL CALC-SCNC: 8 MMOL/L (ref 8–16)
AST SERPL-CCNC: 12 U/L (ref 10–40)
BASOPHILS # BLD AUTO: 0.03 K/UL (ref 0–0.2)
BASOPHILS NFR BLD: 0.7 % (ref 0–1.9)
BILIRUB DIRECT SERPL-MCNC: 0.3 MG/DL (ref 0.1–0.3)
BILIRUB SERPL-MCNC: 0.6 MG/DL (ref 0.1–1)
BUN SERPL-MCNC: 13 MG/DL (ref 6–20)
CALCIUM SERPL-MCNC: 10 MG/DL (ref 8.7–10.5)
CHLORIDE SERPL-SCNC: 106 MMOL/L (ref 95–110)
CO2 SERPL-SCNC: 24 MMOL/L (ref 23–29)
CREAT SERPL-MCNC: 1.1 MG/DL (ref 0.5–1.4)
DIFFERENTIAL METHOD: ABNORMAL
EOSINOPHIL # BLD AUTO: 0.5 K/UL (ref 0–0.5)
EOSINOPHIL NFR BLD: 11.9 % (ref 0–8)
ERYTHROCYTE [DISTWIDTH] IN BLOOD BY AUTOMATED COUNT: 15.6 % (ref 11.5–14.5)
EST. GFR  (AFRICAN AMERICAN): >60 ML/MIN/1.73 M^2
EST. GFR  (NON AFRICAN AMERICAN): >60 ML/MIN/1.73 M^2
GLUCOSE SERPL-MCNC: 91 MG/DL (ref 70–110)
HCT VFR BLD AUTO: 36.1 % (ref 40–54)
HGB BLD-MCNC: 11.4 G/DL (ref 14–18)
IMM GRANULOCYTES # BLD AUTO: 0.01 K/UL (ref 0–0.04)
IMM GRANULOCYTES NFR BLD AUTO: 0.2 % (ref 0–0.5)
LYMPHOCYTES # BLD AUTO: 0.6 K/UL (ref 1–4.8)
LYMPHOCYTES NFR BLD: 13.6 % (ref 18–48)
MCH RBC QN AUTO: 29.2 PG (ref 27–31)
MCHC RBC AUTO-ENTMCNC: 31.6 G/DL (ref 32–36)
MCV RBC AUTO: 93 FL (ref 82–98)
MONOCYTES # BLD AUTO: 0.4 K/UL (ref 0.3–1)
MONOCYTES NFR BLD: 10.7 % (ref 4–15)
NEUTROPHILS # BLD AUTO: 2.5 K/UL (ref 1.8–7.7)
NEUTROPHILS NFR BLD: 62.9 % (ref 38–73)
NRBC BLD-RTO: 0 /100 WBC
PLATELET # BLD AUTO: 123 K/UL (ref 150–350)
PMV BLD AUTO: 11.1 FL (ref 9.2–12.9)
POTASSIUM SERPL-SCNC: 4.2 MMOL/L (ref 3.5–5.1)
PROT SERPL-MCNC: 6.5 G/DL (ref 6–8.4)
RBC # BLD AUTO: 3.9 M/UL (ref 4.6–6.2)
SODIUM SERPL-SCNC: 138 MMOL/L (ref 136–145)
TACROLIMUS BLD-MCNC: 7 NG/ML (ref 5–15)
WBC # BLD AUTO: 4.03 K/UL (ref 3.9–12.7)

## 2019-05-27 PROCEDURE — 80053 COMPREHEN METABOLIC PANEL: CPT

## 2019-05-27 PROCEDURE — 85025 COMPLETE CBC W/AUTO DIFF WBC: CPT

## 2019-05-27 PROCEDURE — 80197 ASSAY OF TACROLIMUS: CPT

## 2019-05-27 PROCEDURE — 82248 BILIRUBIN DIRECT: CPT

## 2019-05-27 PROCEDURE — 36415 COLL VENOUS BLD VENIPUNCTURE: CPT

## 2019-05-27 NOTE — TELEPHONE ENCOUNTER
----- Message from Kostas Levy MD sent at 5/27/2019  2:47 PM CDT -----  Results ok. No action needed

## 2019-05-27 NOTE — TELEPHONE ENCOUNTER
Notified patient via  service that this is a follow up to your recent lab test.  Dr. Levy reviewed your lab and it is stable.  There are no medication changes at this time.  Please repeat labs 6/3/19  Thank you  Susan Skinner, MSN, RNBC    Liver Transplant Coordinator

## 2019-05-28 DIAGNOSIS — Z79.4 TYPE 2 DIABETES MELLITUS WITH HYPERGLYCEMIA, WITH LONG-TERM CURRENT USE OF INSULIN: ICD-10-CM

## 2019-05-28 DIAGNOSIS — Z94.4 LIVER REPLACED BY TRANSPLANT: ICD-10-CM

## 2019-05-28 DIAGNOSIS — E11.65 TYPE 2 DIABETES MELLITUS WITH HYPERGLYCEMIA, WITH LONG-TERM CURRENT USE OF INSULIN: ICD-10-CM

## 2019-05-29 ENCOUNTER — TELEPHONE (OUTPATIENT)
Dept: TRANSPLANT | Facility: CLINIC | Age: 54
End: 2019-05-29

## 2019-05-29 RX ORDER — ACYCLOVIR 400 MG/1
400 TABLET ORAL 2 TIMES DAILY
Qty: 60 TABLET | Refills: 2 | Status: SHIPPED | OUTPATIENT
Start: 2019-05-29 | End: 2019-08-27

## 2019-05-29 RX ORDER — FAMOTIDINE 20 MG/1
20 TABLET, FILM COATED ORAL NIGHTLY
Qty: 30 TABLET | Refills: 3 | Status: SHIPPED | OUTPATIENT
Start: 2019-05-29 | End: 2019-06-03 | Stop reason: ALTCHOICE

## 2019-05-29 RX ORDER — INSULIN ASPART 100 [IU]/ML
8 INJECTION, SOLUTION INTRAVENOUS; SUBCUTANEOUS
Qty: 15 ML | Refills: 0 | Status: SHIPPED | OUTPATIENT
Start: 2019-05-29 | End: 2019-05-30 | Stop reason: ALTCHOICE

## 2019-05-29 NOTE — TELEPHONE ENCOUNTER
Message sent to transplant abdominal pharmacist to advise.  Will call pharmacy upon receipt of instruction.

## 2019-05-29 NOTE — TELEPHONE ENCOUNTER
Patient: Cesario Bailey       MRN: [id number]      : 1965     Age: 52 y.o.  Edwino Caitlinjuan jose Mckeon JON[place]  [place] [address]    Provider: Hepatologist Luh Faulkner    Urgency of review: urgent    Patient Transplant Status: In Evaluation    Reason for presentation: Initial staging for transplant    Clinical Summary:   Patient was diagnosed with alcohol-induced cirrhosis about 5 years ago. He stopped drinking since. He developed ascites, that resolved later. He had an episode variceal bleed in 2016 - EVL done. Has been on nadolol since.   Liver decompensations have been controlled but developed HCC on surveillance screening - 2.1 cm with HCC features. He had TACE on 18 in [place]  AFP 3.3  Matt: 3  MELD: 17    Imaging to be reviewed: CT    HCC Treatment History: TACE 18    ABO: A POS    Platelets:   Lab Results   Component Value Date/Time    PLT 49 (L) 2018 11:51 AM     Creatinine:   Lab Results   Component Value Date/Time    CREATININE 1.0 2018 11:51 AM     Bilirubin:   Lab Results   Component Value Date/Time    BILITOT 3.0 (H) 2018 11:51 AM     AFP Last 3 each if available:   Lab Results   Component Value Date/Time    AFP 3.3 2018 11:51 AM       MELD: MELD-Na score: 17 at 2018 11:51 AM  MELD score: 13 at 2018 11:51 AM  Calculated from:  Serum Creatinine: 1 mg/dL at 2018 11:51 AM  Serum Sodium: 133 mmol/L at 2018 11:51 AM  Total Bilirubin: 3 mg/dL at 2018 11:51 AM  INR(ratio): 1.2 at 2018 11:51 AM  Age: 52 years    Plan: Treatment change in R hepatic lobe without residual.  The hepatic dome lesion I believe does have subtle washout, 1.3 cm overall still indeterminate.  Continue surveillance.  Lung finding not very suspicious for mets, but continued surveillance advised.  Repeat CT c/a in 3 months.    Note forwarded to ALEXIS Arnold RN to coordinate follow-up    Follow-up Provider: Oni Faulkner MD   Pt reports feeling weak today, similar to past several days. No new chest pain, dyspnea, or other symptoms. Tolerated permacath placement this morning and currently tolerating HD. Afebrile, hypertensive this morning. Comfortable on exam. Permacath site and LLE Shiley both c/d/i without erythema. WBCs trending down at 12.6 today. MRCP reveals abrupt narrowing of CBD at level of the pancreatic head, though without a visualized mass. Spoke with GI, who consider these findings consistent with pancreatic head mass until proven otherwise.     Continue iHD; likely will remove LLE Shiley if permacath flow adequate. Will increase amlodipine back to 10mg as long as BP does not drop post-HD. Plan in-house pharmacologic stress test in anticipation of repeat EUS/ERCP in the coming weeks, as well as possible pancreas resection; await Surgical Oncology evaluation.

## 2019-05-29 NOTE — TELEPHONE ENCOUNTER
----- Message from Meredith Barragan sent at 5/29/2019  2:24 PM CDT -----  Pharmacy Calling    Reason for call:  Stated that insulin aspart U-100 (NOVOLOG) 100 unit/mL (3 mL) InPn pen isnt covered by insurance     [facility]: [facility]     Prescription Name:     Phone Number:  [phone]  Additional Information:  Fax [phone]        Humalog is preferred for insurance coverage

## 2019-05-30 ENCOUNTER — TELEPHONE (OUTPATIENT)
Dept: TRANSPLANT | Facility: CLINIC | Age: 54
End: 2019-05-30

## 2019-05-30 DIAGNOSIS — Z79.4 TYPE 2 DIABETES MELLITUS WITH HYPERGLYCEMIA, WITH LONG-TERM CURRENT USE OF INSULIN: Primary | ICD-10-CM

## 2019-05-30 DIAGNOSIS — E11.65 TYPE 2 DIABETES MELLITUS WITH HYPERGLYCEMIA, WITH LONG-TERM CURRENT USE OF INSULIN: Primary | ICD-10-CM

## 2019-05-30 RX ORDER — TACROLIMUS 1 MG/1
8 CAPSULE ORAL EVERY 12 HOURS
Qty: 480 CAPSULE | Refills: 11 | Status: SHIPPED | OUTPATIENT
Start: 2019-05-30 | End: 2019-06-03

## 2019-05-30 RX ORDER — MYCOPHENOLATE MOFETIL 250 MG/1
1000 CAPSULE ORAL 2 TIMES DAILY
Qty: 240 CAPSULE | Refills: 2 | Status: SHIPPED | OUTPATIENT
Start: 2019-05-30

## 2019-05-30 RX ORDER — INSULIN LISPRO 100 [IU]/ML
8 INJECTION, SOLUTION INTRAVENOUS; SUBCUTANEOUS
Qty: 15 ML | Refills: 0 | Status: SHIPPED | OUTPATIENT
Start: 2019-05-30 | End: 2019-07-11 | Stop reason: SDUPTHER

## 2019-05-30 NOTE — TELEPHONE ENCOUNTER
----- Message from Dena Gagnon sent at 5/30/2019  9:56 AM CDT -----  Contact: [facility]  Needs Advice    Reason for call: refill on predniSONE (DELTASONE) 10 MG tablet        Communication Preference: Phone: [phone] \ Fax [phone]     Additional Information: n/a

## 2019-05-30 NOTE — TELEPHONE ENCOUNTER
----- Message from Kyara Abraham, PharmD sent at 5/30/2019  8:04 AM CDT -----  NP. Will send a script for Humalog pen,  Not sending refills, endo should prescribe.  ----- Message -----  From: Libia Ruiz RN  Sent: 5/29/2019   4:46 PM  To: Abdominal Transplant Pharmacists    Please see message below and advise.  Thanks,  Libia Ruiz RN, BSN    ----- Message -----  From: Meredith Barragan  Sent: 5/29/2019   2:24 PM  To: [facility]    Pharmacy Calling    Reason for call:  Stated that insulin aspart U-100 (NOVOLOG) 100 unit/mL (3 mL) InPn pen isnt covered by insurance     [facility]     Prescription Name:     Phone Number:  [phone]  Additional Information:  Fax [phone]        Humalog is preferred for insurance coverage

## 2019-06-03 ENCOUNTER — LAB VISIT (OUTPATIENT)
Dept: LAB | Facility: HOSPITAL | Age: 54
End: 2019-06-03
Attending: INTERNAL MEDICINE
Payer: COMMERCIAL

## 2019-06-03 ENCOUNTER — OFFICE VISIT (OUTPATIENT)
Dept: TRANSPLANT | Facility: CLINIC | Age: 54
End: 2019-06-03
Payer: COMMERCIAL

## 2019-06-03 VITALS
OXYGEN SATURATION: 100 % | DIASTOLIC BLOOD PRESSURE: 74 MMHG | WEIGHT: 161.81 LBS | HEART RATE: 84 BPM | HEIGHT: 67 IN | TEMPERATURE: 98 F | RESPIRATION RATE: 20 BRPM | BODY MASS INDEX: 25.4 KG/M2 | SYSTOLIC BLOOD PRESSURE: 140 MMHG

## 2019-06-03 DIAGNOSIS — Z91.89 AT RISK FOR OPPORTUNISTIC INFECTIONS: ICD-10-CM

## 2019-06-03 DIAGNOSIS — Z48.23 ENCOUNTER FOR AFTERCARE FOLLOWING LIVER TRANSPLANT: Primary | ICD-10-CM

## 2019-06-03 DIAGNOSIS — Z85.05 HISTORY OF PRIMARY LIVER CANCER: ICD-10-CM

## 2019-06-03 DIAGNOSIS — Z79.60 LONG-TERM USE OF IMMUNOSUPPRESSANT MEDICATION: ICD-10-CM

## 2019-06-03 DIAGNOSIS — Z94.4 LIVER TRANSPLANTED: ICD-10-CM

## 2019-06-03 DIAGNOSIS — Z29.89 PROPHYLACTIC IMMUNOTHERAPY: ICD-10-CM

## 2019-06-03 DIAGNOSIS — Z94.4 LIVER REPLACED BY TRANSPLANT: ICD-10-CM

## 2019-06-03 PROBLEM — E87.20 ACIDOSIS: Status: RESOLVED | Noted: 2019-04-30 | Resolved: 2019-06-03

## 2019-06-03 PROBLEM — E66.09 CLASS 1 OBESITY DUE TO EXCESS CALORIES IN ADULT: Status: RESOLVED | Noted: 2019-04-27 | Resolved: 2019-06-03

## 2019-06-03 PROBLEM — D62 ACUTE BLOOD LOSS ANEMIA: Status: RESOLVED | Noted: 2019-04-30 | Resolved: 2019-06-03

## 2019-06-03 PROBLEM — C22.0 HCC (HEPATOCELLULAR CARCINOMA): Status: RESOLVED | Noted: 2018-07-16 | Resolved: 2019-06-03

## 2019-06-03 LAB
ALBUMIN SERPL BCP-MCNC: 4.3 G/DL (ref 3.5–5.2)
ALP SERPL-CCNC: 56 U/L (ref 55–135)
ALT SERPL W/O P-5'-P-CCNC: 14 U/L (ref 10–44)
ANION GAP SERPL CALC-SCNC: 9 MMOL/L (ref 8–16)
AST SERPL-CCNC: 13 U/L (ref 10–40)
BASOPHILS # BLD AUTO: 0.01 K/UL (ref 0–0.2)
BASOPHILS NFR BLD: 0.2 % (ref 0–1.9)
BILIRUB SERPL-MCNC: 0.6 MG/DL (ref 0.1–1)
BUN SERPL-MCNC: 12 MG/DL (ref 6–20)
CALCIUM SERPL-MCNC: 10 MG/DL (ref 8.7–10.5)
CHLORIDE SERPL-SCNC: 108 MMOL/L (ref 95–110)
CO2 SERPL-SCNC: 22 MMOL/L (ref 23–29)
CREAT SERPL-MCNC: 1.2 MG/DL (ref 0.5–1.4)
DIFFERENTIAL METHOD: ABNORMAL
EOSINOPHIL # BLD AUTO: 0.3 K/UL (ref 0–0.5)
EOSINOPHIL NFR BLD: 6 % (ref 0–8)
ERYTHROCYTE [DISTWIDTH] IN BLOOD BY AUTOMATED COUNT: 15 % (ref 11.5–14.5)
EST. GFR  (AFRICAN AMERICAN): >60 ML/MIN/1.73 M^2
EST. GFR  (NON AFRICAN AMERICAN): >60 ML/MIN/1.73 M^2
GLUCOSE SERPL-MCNC: 112 MG/DL (ref 70–110)
HCT VFR BLD AUTO: 40.2 % (ref 40–54)
HGB BLD-MCNC: 12.6 G/DL (ref 14–18)
IMM GRANULOCYTES # BLD AUTO: 0.01 K/UL (ref 0–0.04)
IMM GRANULOCYTES NFR BLD AUTO: 0.2 % (ref 0–0.5)
LYMPHOCYTES # BLD AUTO: 0.7 K/UL (ref 1–4.8)
LYMPHOCYTES NFR BLD: 14.7 % (ref 18–48)
MCH RBC QN AUTO: 28.6 PG (ref 27–31)
MCHC RBC AUTO-ENTMCNC: 31.3 G/DL (ref 32–36)
MCV RBC AUTO: 91 FL (ref 82–98)
MONOCYTES # BLD AUTO: 0.4 K/UL (ref 0.3–1)
MONOCYTES NFR BLD: 9.1 % (ref 4–15)
NEUTROPHILS # BLD AUTO: 3.4 K/UL (ref 1.8–7.7)
NEUTROPHILS NFR BLD: 69.8 % (ref 38–73)
NRBC BLD-RTO: 0 /100 WBC
PLATELET # BLD AUTO: 142 K/UL (ref 150–350)
PMV BLD AUTO: 11.1 FL (ref 9.2–12.9)
POTASSIUM SERPL-SCNC: 4.8 MMOL/L (ref 3.5–5.1)
PROT SERPL-MCNC: 6.8 G/DL (ref 6–8.4)
RBC # BLD AUTO: 4.4 M/UL (ref 4.6–6.2)
SODIUM SERPL-SCNC: 139 MMOL/L (ref 136–145)
TACROLIMUS BLD-MCNC: 9.6 NG/ML (ref 5–15)
WBC # BLD AUTO: 4.83 K/UL (ref 3.9–12.7)

## 2019-06-03 PROCEDURE — 99999 PR PBB SHADOW E&M-EST. PATIENT-LVL III: CPT | Mod: PBBFAC,,, | Performed by: INTERNAL MEDICINE

## 2019-06-03 PROCEDURE — 99999 PR PBB SHADOW E&M-EST. PATIENT-LVL III: ICD-10-PCS | Mod: PBBFAC,,, | Performed by: INTERNAL MEDICINE

## 2019-06-03 PROCEDURE — 80197 ASSAY OF TACROLIMUS: CPT

## 2019-06-03 PROCEDURE — 99215 PR OFFICE/OUTPT VISIT, EST, LEVL V, 40-54 MIN: ICD-10-PCS | Mod: S$GLB,,, | Performed by: INTERNAL MEDICINE

## 2019-06-03 PROCEDURE — 99215 OFFICE O/P EST HI 40 MIN: CPT | Mod: S$GLB,,, | Performed by: INTERNAL MEDICINE

## 2019-06-03 PROCEDURE — 85025 COMPLETE CBC W/AUTO DIFF WBC: CPT

## 2019-06-03 PROCEDURE — 80053 COMPREHEN METABOLIC PANEL: CPT

## 2019-06-03 PROCEDURE — 36415 COLL VENOUS BLD VENIPUNCTURE: CPT

## 2019-06-03 RX ORDER — SULFAMETHOXAZOLE AND TRIMETHOPRIM 400; 80 MG/1; MG/1
1 TABLET ORAL DAILY
Qty: 60 TABLET | Refills: 5 | Status: SHIPPED | OUTPATIENT
Start: 2019-06-03 | End: 2019-11-30

## 2019-06-03 RX ORDER — TACROLIMUS 1 MG/1
CAPSULE ORAL
Qty: 450 CAPSULE | Refills: 11 | Status: SHIPPED | OUTPATIENT
Start: 2019-06-03

## 2019-06-03 NOTE — TELEPHONE ENCOUNTER
----- Message from Oni Faulkner MD sent at 6/3/2019  1:46 PM CDT -----  Prograf level was not available when I saw him.  Please recommend patient to decrease his Prograf to 8/7.  He is ready to return home to [place].  If he is still here can repeat his labs next week

## 2019-06-03 NOTE — Clinical Note
Doing well, FK needs to be reduced to 8/7, I sent you a separate result review, he can go back to [place].

## 2019-06-03 NOTE — PROGRESS NOTES
Transplant Hepatology  Liver Transplant Recipient Follow-up    Original Referring Physician: Saskia Dc  Current Corresponding Physician:     Chief Complaint: Cesario is here for follow up of his liver transplant performed 2019 for the primary diagnosis (UNOS) of Primary Liver Malignancy: Hepatoma (HCC) and Cirrhosis    ORGAN: LIVER  Whole or Partial: whole liver  Donor Type:  - brain death  PHS Increased Risk: no  Donor CMV Status: Negative  Donor HCV Status: Negative  Donor HBcAb: Negative  Donor HBV MARIA R: Negative  Donor HCV MARIA R: Negative    Biliary Anastomosis: end to end  Arterial Anatomy: standard  IVC reconstruction: end to end ivc  Portal vein status: partial patent    1. 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 PORTAL CLOT (REMOVAL):  Organizing thrombus  Negative for tumor    Histologic type: Hepatocellular carcinoma  Histologic grade: Moderately differentiated, (grade 2 out of 4)  Tumor focality: Unifocal  Tumor size and location:  Tumor # 1: 1.0 cm, right lobe, no necrosis  Treatment effect: Not present  Tumor extension: Tumor confined to liver  Margins:  Margins are negative for tumor  No lympho-vascular invasion  No perineural invasion  No satellite nodules  Pathologic staging: pT1 NX MX (AJCC)  pT1: Solitary tumor <2 cm    Immunosuppressions  Tacrolimus:                                          no  MMF:                                                    no  Prednisone:                                         no  Cyclosporine:                                       no  Sirolimus:                                             no  Everolimus:                                           no    Graft Function:                       excellent      Post-LT Complications  Acute cellular rejection:                       no  Antibody-mediated rejection:               no  Biliary anastomotic stricture:               no  HAT:                                                    no  HA stenosis:                                        no  PV stenosis:                                        no  CMV reactivation:                                no  PTLD:                                                   no  Other malignancies:                             no      Liver Biopsy:  n/a    VCTE (fibroscan):  n/a    Post-LT Recommended Screening Tests  Dermatology check up:                           no  Colonoscopy:                                          no  Bone Mineral Density (BMD):                 no  HbA1C:                                                    Yes  Lab Results   Component Value Date    HGBA1C 6.9 (H) 04/26/2019         Seasonal flu vaccination: n/a      Subjective:     History of Present Illness: He has had the following complications since transplant: none.   Interval History: Currently, he is doing well.  Current complaints include none.  Cesario is here for management of his immunosuppression medication.    Review of Systems   Constitutional: Negative.    Respiratory: Negative.    Cardiovascular: Negative.    Gastrointestinal: Negative.    All other systems reviewed and are negative.      Objective:     Physical Exam   Constitutional: He appears well-developed and well-nourished.   Pulmonary/Chest: Effort normal.   Abdominal: Soft. Bowel sounds are normal.       Vitals reviewed.    Lab Results   Component Value Date    BILITOT 0.6 06/03/2019    AST 13 06/03/2019    ALT 14 06/03/2019    ALKPHOS 56 06/03/2019    CREATININE 1.2 06/03/2019    ALBUMIN 4.3 06/03/2019     Lab Results   Component Value Date    WBC 4.83 06/03/2019    HGB 12.6 (L) 06/03/2019    HCT 40.2 06/03/2019    HCT 24  (L) 04/27/2019     (L) 06/03/2019     Lab Results   Component Value Date    TACROLIMUS 7.0 05/27/2019       Assessment/Plan:          · S/P liver transplant. Excellent allograft function   · Chronic immunosuppressive medications for rejection prophylaxis supratherapeutic.  Plan: decrease tacro to 8/7.  · Continue monitoring symptoms, labs and drug levels for drug-related toxicity and side effects.  · Incision: well healed, no evidence of hernia    Patient is medically stable to return home to [place]. He will follow with Saskia Dc MD in [place].    Maintenance:  -Instructed to f/u with PCP for regular health maintenance care including cancer screenings and BMD  -Reviewed need to avoid sun exposure with use of sunblock, hats, long sleeves related to increased risk of skin cancers  -Recommend f/u with Dermatology for annual skin checks  - continue anti-hypertensive agents, and check with PCP, goal /80 mm Hg  - post liver transplant counseling    UNOS Patient Status  Functional Status: 70% - Cares for self: unable to carry on normal activity or active work  Physical Capacity: No Limitations     A total of 60 minutes were spent face-to-face with the patient during this encounter and over half of that time was spent on counseling and coordination of care.  We discussed in depth the nature of the patient's immunocompromised status, liver graft function, immunosuppression, preventative measures and the management plan in details. I also educated the patient about lifestyle modifications which may improve hepatic steatosis, overweight/obesity, insulin resistance and high blood pressure issues. I have provided the patient with an opportunity to ask questions and have all questions answered to his satisfaction.     I have sent communication to the referring physician and/or primary care provider.    Oni Faulkner MD  Staff Physician  Hepatology and Liver Transplant  [facility]  Multi-Organ Transplant [place]

## 2019-06-03 NOTE — TELEPHONE ENCOUNTER
Rodolfo Arambula with International Department labs reviewed by Dr. Faulkner. Instructed to reduce prograf to 8 mg in am and 7 mg in pm. Pt cleared to return home to [place]. Pt scheduled to leave Friday 6/7 and will have repeat labs next week per transplant MD in [place]. She will notify pt.

## 2019-06-04 ENCOUNTER — TELEPHONE (OUTPATIENT)
Dept: ENDOCRINOLOGY | Facility: HOSPITAL | Age: 54
End: 2019-06-04

## 2019-06-04 NOTE — TELEPHONE ENCOUNTER
Assess patient's BG logs, which he sent to the clinic. Patient has demonstrated reduced insulin needs. I spoke with patient via telephone, and discussed need for the following changes:     Lanuts 6 units in HS  Novolog 6 units TIDWM with following correction     180 - 230 + 2 unit   231- 280  + 4 units   281 - 330 + 6 units   331 - 380 + 8 units       > 380   + 10 units    Patient verbalized understanding. All questions answered to patients satisfaction. Patient is to continue to keep BG logs, and send to clinic.

## 2019-06-06 ENCOUNTER — CLINICAL SUPPORT (OUTPATIENT)
Dept: DIABETES | Facility: CLINIC | Age: 54
End: 2019-06-06
Payer: COMMERCIAL

## 2019-06-06 DIAGNOSIS — Z79.4 TYPE 2 DIABETES MELLITUS WITH HYPERGLYCEMIA, WITH LONG-TERM CURRENT USE OF INSULIN: ICD-10-CM

## 2019-06-06 DIAGNOSIS — E11.65 TYPE 2 DIABETES MELLITUS WITH HYPERGLYCEMIA, WITH LONG-TERM CURRENT USE OF INSULIN: ICD-10-CM

## 2019-06-06 PROCEDURE — G0108 PR DIAB MANAGE TRN  PER INDIV: ICD-10-PCS | Mod: S$GLB,,, | Performed by: INTERNAL MEDICINE

## 2019-06-06 PROCEDURE — G0108 DIAB MANAGE TRN  PER INDIV: HCPCS | Mod: S$GLB,,, | Performed by: INTERNAL MEDICINE

## 2019-06-06 NOTE — PROGRESS NOTES
Diabetes Education  Author: Anat Brand RN  Date: 6/6/2019    Diabetes Care Management Summary  Diabetes Education Record Assessment/Progress: Initial  Current Diabetes Risk Level: Moderate     Last A1c:   Lab Results   Component Value Date    HGBA1C 6.9 (H) 04/26/2019     Last visit with Diabetes Educator: : 06/06/2019    Diabetes Type  Diabetes Type : Type II    Diabetes History  Current Treatment: Insulin  Reviewed Problem List with Patient: Yes    Health Maintenance was reviewed today with patient. Discussed with patient importance of routine eye exams, foot exams/foot care, blood work (i.e.: A1c, microalbumin, and lipid), dental visits, yearly flu vaccine, and pneumonia vaccine as indicated by PCP. Patient verbalized understanding.     Health Maintenance Topics with due status: Not Due       Topic Last Completion Date    TETANUS VACCINE 07/20/2018    Colonoscopy 07/23/2018    Hemoglobin A1c 04/26/2019    Influenza Vaccine Not Due     Health Maintenance Due   Topic Date Due    Lipid Panel  1965    Foot Exam  09/02/1974    Eye Exam  09/02/1974    Urine Microalbumin  09/02/1974    Pneumococcal Vaccine (Highest Risk) (2 of 3 - PPSV23) 09/14/2018       Nutrition  Meal Planning: artificial sweeteners, 3 meals per day, water  What type of sweetener do you use?: Splenda  Meal Plan 24 Hour Recall - Breakfast: oatmeal with milk; toast bread and cheese  Meal Plan 24 Hour Recall - Lunch: pasta, rice, meat  Meal Plan 24 Hour Recall - Dinner: tends to eat smaller as above  Meal Plan 24 Hour Recall - Snack: none    Monitoring   Blood Glucose Logs: Yes  Do you use a personal continuous glucose monitor?: No  In the last month, how often have you had a low blood sugar reaction?: once  What are your symptoms of low blood sugar?: reports sweating  How do you treat low blood sugar?: cracker, juice or candy    Current Diabetes Treatment   Current Treatment: Insulin    Social History  Preferred Learning Method: Face to  Face  Primary Support: Spouse  Smoking Status: Never a Smoker  Alcohol Use: Never    PHQ-2 Total Score: 0       DDS-2 Score  ( > 3 = SIGNIFICANT DISTRESS): 3.5  DDS Score  ( > 3 = SIGNIFICANT DISTRESS): 1.35  Emotional [place] Score: 1.6  Physician-Related Distress: 1  Regimen-Related Distress: 1.6  Interpersonal Distress: 1    Barriers to Change  Barriers to Change: None  Learning Challenges : Language(Speaks Sudanese only)  Language -  present?: Yes    Readiness to Learn   Readiness to Learn : Acceptance    Cultural Influences  Cultural Influences: None    Diabetes Education Assessment/Progress    Diabetes Disease Process (diabetes disease process and treatment options): Discussion, Comprehends Key Points, Written Materials Provided. Pt is here today for post transplant surgery. Reviewing his bg logs.    Nutrition (Incorporating nutritional management into one's lifestyle): Discussion, Instructed, Comprehends Key Points, Written Materials Provided. Plate method for carb counting was discussed. Encouraged portion sizes especially since pt eats rice, bread and pasta. Drinks small amount of juice, Drinks coffee with milk and Splenda    Physical Activity (incorporating physical activity into one's lifestyle): Not Covered/Deferred(time constraints)    Medications (states correct name, dose, onset, peak, duration, side effects & timing of meds): Discussion, Comprehends Key Points, Written Materials Provided   Current meds: Lantus 6 units (changed on 6/4/19) Novolog 6/6/6/ pluse slide 180 +2.  Pt has continued to take Lantus 8 units, states bgs were better with this dose.  Logs were APRN and stated that Lantus 8 units would be fine. Discussed timing and mechanism of action of long vs rapid acting insulin. Reviewed need for rotation of injection sites, appropriate insulin storage, safe disposal of used sharps and insulin pen preparation and use. Discussed possible side effects and how to avoid these.      Monitoring (monitoring blood glucose/other parameters & using results): Discussion, Comprehends Key Points, Written Materials Provided SMBG 4 x a day FBS ; Lunch ; Dinner . Lunch and Dinner readings tend to run higher than FBS. Pt was counseled on foods that can affect this    Acute Complications (preventing, detecting, and treating acute complications): Discussion, Comprehends Key Points, Written Materials Provided Signs and symptoms of hypoglycemia and hyperglycemia discussed and treatment for both.  Chronic Complications (preventing, detecting, and treating chronic complications): Discussion, Comprehends Key Points, Written Materials Provided  Signs and symptoms of hypoglycemia and hyperglycemia discussed and treatment for both. P (diabetes, other pertinent medical history, and relevant comorbidities reviewed during visit): Discussion, Comprehends Key Points, Written Materials Provided    Cognitive (knowledge of self-management skills, functional health literacy): Discussion, Comprehends Key Points, Written Materials Provided  Psychosocial (emotional response to diabetes): Not Covered/Deferred(time constraints)  Diabetes Distress and Support Systems: Not Covered/Deferred(time constraints)  Behavioral (readiness for change, lifestyle practices, self-care behaviors): Discussion, Comprehends Key Points, Written Materials Provided       Diabetes Meal Plan  Restrictions: Restricted Carbohydrate Advised to begin measuring portion sizes. Instructed on plate method.    Today's Self-Management Care Plan was developed with the patient's input and is based on barriers identified during today's assessment.    The long and short-term goals in the care plan were written with the patient/caregiver's input. The patient has agreed to work toward these goals to improve his overall diabetes control.      The patient received a copy of today's self-management plan and verbalized understanding of the care plan,  goals, and all of today's instructions.      The patient was encouraged to communicate with his physician and care team regarding his condition(s) and treatment.  I provided the patient with my contact information today and encouraged him to contact me via phone or patient portal as needed. Patient verbalized understaning of today's session.  He was provided with ed materials in [place].  was present for visit.    Patient to leave [place] for [place] home in [place] this week.  Has an endocrine appt set up with Fran Palacios MD.    Time Spent: 60 mins

## 2019-06-11 LAB
EXT ALBUMIN: 4.2
EXT ALKALINE PHOSPHATASE: 51
EXT ALT: 17
EXT AST: 9
EXT BILIRUBIN TOTAL: 0.4
EXT BUN: 14
EXT CALCIUM: 8.9
EXT CHLORIDE: 110
EXT CO2: 26.7
EXT CREATININE: 1.22 MG/DL
EXT EOSINOPHIL%: 5.6
EXT GLUCOSE: 117
EXT HEMATOCRIT: 37.2
EXT HEMOGLOBIN: 11.9
EXT INR: 1.15
EXT LYMPH%: 15.7
EXT MONOCYTES%: 9.8
EXT PLATELETS: 142
EXT POTASSIUM: 5.1
EXT PROTEIN TOTAL: 6.7
EXT SEGS%: 68.6
EXT SODIUM: 140 MMOL/L
EXT TACROLIMUS LVL: 9
EXT WBC: 3

## 2019-06-14 ENCOUNTER — TELEPHONE (OUTPATIENT)
Dept: TRANSPLANT | Facility: CLINIC | Age: 54
End: 2019-06-14

## 2019-06-14 NOTE — TELEPHONE ENCOUNTER
----- Message from Oni Faulkner MD sent at 6/14/2019 12:42 PM CDT -----  Please reduce tacro to 7/7, labs in 1 week if we are managing his IS. He should be seeing transplant MED in [place].

## 2019-06-14 NOTE — TELEPHONE ENCOUNTER
Pt's immunosuppression being managed by local MD in [place] Dr. Dc, sending labs for report follow up only.

## 2019-06-21 ENCOUNTER — TELEPHONE (OUTPATIENT)
Dept: TRANSPLANT | Facility: CLINIC | Age: 54
End: 2019-06-21

## 2019-06-21 NOTE — TELEPHONE ENCOUNTER
Labs received from [place] done on 6/18/19 show normal liver and kidney function. Pt being managed by local MD in [place]. Will scan into medical record.

## 2019-06-28 ENCOUNTER — TELEPHONE (OUTPATIENT)
Dept: TRANSPLANT | Facility: CLINIC | Age: 54
End: 2019-06-28

## 2019-06-28 NOTE — TELEPHONE ENCOUNTER
Labs received from [facility] done on 6/25/19 show normal liver and kidney function. Pt being managed by local MD in [place]. Will scan into medical record.

## 2019-07-11 DIAGNOSIS — E11.65 TYPE 2 DIABETES MELLITUS WITH HYPERGLYCEMIA, WITH LONG-TERM CURRENT USE OF INSULIN: ICD-10-CM

## 2019-07-11 DIAGNOSIS — Z79.4 TYPE 2 DIABETES MELLITUS WITH HYPERGLYCEMIA, WITH LONG-TERM CURRENT USE OF INSULIN: ICD-10-CM

## 2019-07-15 RX ORDER — INSULIN LISPRO 100 [IU]/ML
INJECTION, SOLUTION INTRAVENOUS; SUBCUTANEOUS
Qty: 15 SYRINGE | Refills: 0 | Status: SHIPPED | OUTPATIENT
Start: 2019-07-15

## 2019-07-31 ENCOUNTER — TELEPHONE (OUTPATIENT)
Dept: TRANSPLANT | Facility: CLINIC | Age: 54
End: 2019-07-31

## 2019-07-31 NOTE — TELEPHONE ENCOUNTER
Labs received from [place] done on 7/23/19 show normal liver and kidney function. Pt being managed by local MD in [place]. Will scan into medical record

## 2019-09-04 ENCOUNTER — TELEPHONE (OUTPATIENT)
Dept: TRANSPLANT | Facility: CLINIC | Age: 54
End: 2019-09-04

## 2019-09-04 NOTE — TELEPHONE ENCOUNTER
E-mail send to Meghana in International Department asking for update or recent labs on pt. Will await response.

## 2019-09-06 ENCOUNTER — TELEPHONE (OUTPATIENT)
Dept: TRANSPLANT | Facility: CLINIC | Age: 54
End: 2019-09-06

## 2019-09-06 NOTE — TELEPHONE ENCOUNTER
Received labs from International department showing normal liver and kidney function. Pt to continue to follow with local MD in [place], will scan into medical record.    stated

## 2019-10-02 ENCOUNTER — TELEPHONE (OUTPATIENT)
Dept: TRANSPLANT | Facility: CLINIC | Age: 54
End: 2019-10-02

## 2019-10-02 NOTE — TELEPHONE ENCOUNTER
Received labs from International department showing normal liver and kidney function. Pt to continue to follow with local MD in [place], will scan into medical record.

## 2019-10-29 ENCOUNTER — TELEPHONE (OUTPATIENT)
Dept: TRANSPLANT | Facility: CLINIC | Age: 54
End: 2019-10-29

## 2019-10-29 NOTE — TELEPHONE ENCOUNTER
Received labs from International department showing normal liver and kidney function. Pt to continue to follow with local MD in [place], will scan into medical record.

## 2019-11-26 ENCOUNTER — TELEPHONE (OUTPATIENT)
Dept: TRANSPLANT | Facility: CLINIC | Age: 54
End: 2019-11-26

## 2019-11-26 NOTE — TELEPHONE ENCOUNTER
Received labs from International department showing normal liver and kidney function. Pt to continue to follow with local MD in [place], will scan into medical record.

## 2020-02-11 ENCOUNTER — TELEPHONE (OUTPATIENT)
Dept: TRANSPLANT | Facility: CLINIC | Age: 55
End: 2020-02-11

## 2020-02-11 NOTE — TELEPHONE ENCOUNTER
Received labs from International department showing normal liver and kidney function. Pt to continue to follow with local MD in [place], will scan into medical record.

## 2020-04-03 ENCOUNTER — TELEPHONE (OUTPATIENT)
Dept: TRANSPLANT | Facility: CLINIC | Age: 55
End: 2020-04-03

## 2020-04-03 NOTE — TELEPHONE ENCOUNTER
Received labs from International department showing normal liver and kidney function. Pt to continue to follow with local MD in [place], will scan into medical record.

## 2020-05-27 ENCOUNTER — TELEPHONE (OUTPATIENT)
Dept: TRANSPLANT | Facility: CLINIC | Age: 55
End: 2020-05-27

## 2020-05-27 NOTE — TELEPHONE ENCOUNTER
Received labs from International department showing normal liver and kidney function. Pt to continue to follow with local MD in [place], will scan into medical record.

## 2020-07-13 ENCOUNTER — TELEPHONE (OUTPATIENT)
Dept: TRANSPLANT | Facility: CLINIC | Age: 55
End: 2020-07-13

## 2020-07-13 NOTE — PT/OT/SLP DISCHARGE
Internal Medicine Physical Therapy Discharge Summary    Name: Cesario Bailey  MRN: [id number]   Principal Problem: Liver replaced by transplant     Patient Discharged from acute Physical Therapy on 19.  Please refer to prior PT noted date on 19 for functional status.     Assessment:     Ex-lap on     Objective:     GOALS:   Multidisciplinary Problems     Physical Therapy Goals        Problem: Physical Therapy Goal    Goal Priority Disciplines Outcome Goal Variances Interventions   Physical Therapy Goal     PT, PT/OT Ongoing (interventions implemented as appropriate)     Description:  Goals to be met by: 5/10/19     Patient will increase functional independence with mobility by performin. Supine to sit with Supervision  2. Sit to supine with Supervision  3. Sit to stand transfer with Supervision  4. Gait  x 200 feet with Stand-by Assistance using no AD.   5. Lower extremity standing exercise program x20 reps per handout, with supervision                      Reasons for Discontinuation of Therapy Services  Transfer to alternate level of care.      Plan:     Patient Discharged to: Step down unit  .    Areli Saul, PT  2019

## 2020-10-09 ENCOUNTER — TELEPHONE (OUTPATIENT)
Dept: TRANSPLANT | Facility: CLINIC | Age: 55
End: 2020-10-09

## 2020-11-05 ENCOUNTER — TELEPHONE (OUTPATIENT)
Dept: TRANSPLANT | Facility: CLINIC | Age: 55
End: 2020-11-05

## 2020-11-05 NOTE — TELEPHONE ENCOUNTER
Received labs from International department showing normal liver and kidney function along with HCC surveillance scans.  Pt to continue to follow with local MD in [place], will scan into medical record.

## 2020-12-17 ENCOUNTER — TELEPHONE (OUTPATIENT)
Dept: TRANSPLANT | Facility: CLINIC | Age: 55
End: 2020-12-17

## 2020-12-17 NOTE — TELEPHONE ENCOUNTER
Received labs from International department showing normal liver and kidney function along with HCC surveillance scans.  Pt to continue to follow with local MD in [place], will scan into medical record.

## 2021-02-11 ENCOUNTER — TELEPHONE (OUTPATIENT)
Dept: TRANSPLANT | Facility: CLINIC | Age: 56
End: 2021-02-11

## 2021-06-10 ENCOUNTER — TELEPHONE (OUTPATIENT)
Dept: TRANSPLANT | Facility: CLINIC | Age: 56
End: 2021-06-10

## 2021-10-20 ENCOUNTER — TELEPHONE (OUTPATIENT)
Dept: TRANSPLANT | Facility: CLINIC | Age: 56
End: 2021-10-20

## (undated) DEVICE — WARMER DRAPE STERILE LF

## (undated) DEVICE — DRAPE BAG ISOLATION 20 X 20

## (undated) DEVICE — SUT PROLENE 4-0 SH BLU 36IN

## (undated) DEVICE — SEE MEDLINE ITEM 152622

## (undated) DEVICE — DRESSING ABSRBNT ISLAND 3.6X8

## (undated) DEVICE — SEE MEDLINE ITEM 156902

## (undated) DEVICE — EVACUATOR WOUND BULB 100CC

## (undated) DEVICE — FIBRILLAR ABS HEMOSTAT 4X4

## (undated) DEVICE — HEMOSTAT SURGICEL NU-KNIT 6X9

## (undated) DEVICE — BOOT AIR FLUID HEEL ADLT STD

## (undated) DEVICE — KIT SAHARA DRAPE DRAW/LIFT

## (undated) DEVICE — WIPE ESENTA BARR STNG FREE 3ML

## (undated) DEVICE — SET EXTENSION STERILE 30IN

## (undated) DEVICE — DRESSING AQUACEL SACRAL 9 X 9

## (undated) DEVICE — SOL NS 1000CC

## (undated) DEVICE — SUT PDS BV 6-0

## (undated) DEVICE — SET IV ADMIN 15DROP 3 CARESITE

## (undated) DEVICE — SUT ETHILON 3-0 PS2 18 BLK

## (undated) DEVICE — SUT 4-0 12-18IN SILK BLACK

## (undated) DEVICE — SUT ETHILON 3-0 FS-1 30

## (undated) DEVICE — SEE MEDLINE ITEM 146417

## (undated) DEVICE — STAPLER SKIN PROXIMATE WIDE

## (undated) DEVICE — RESERVOIR 100ML

## (undated) DEVICE — SUT PROLENE 6-0 BV-1 30IN

## (undated) DEVICE — SUT SILK 0 STRANDS 30IN BLK

## (undated) DEVICE — SUT 3-0 12-18IN SILK

## (undated) DEVICE — SUT PROLENE 3-0 SH DA 36 BL

## (undated) DEVICE — HANDSET ARGON PLUS

## (undated) DEVICE — DRAIN CHANNEL ROUND 19FR

## (undated) DEVICE — TOWEL OR XRAY WHITE 17X26IN

## (undated) DEVICE — CLIPPER BLADE MOD 4406 (CAREF)

## (undated) DEVICE — NDL MONOPTY BIOPSY 14GX10CM

## (undated) DEVICE — SEE MEDLINE ITEM 156901

## (undated) DEVICE — APPLICATOR CHLORAPREP ORN 26ML

## (undated) DEVICE — SUT SILK 3-0 SH 18IN BLACK

## (undated) DEVICE — ELECTRODE REM PLYHSV RETURN 9

## (undated) DEVICE — CLIP SPRING 6MM

## (undated) DEVICE — PAD K-THERMIA 24IN X 60IN

## (undated) DEVICE — PACK UNIVERSAL SPLIT II

## (undated) DEVICE — TRAY FOLEY 16FR INFECTION CONT

## (undated) DEVICE — SYR ONLY LUER LOCK 20CC

## (undated) DEVICE — SUT SILK 3-0 STRANDS 30IN

## (undated) DEVICE — SUT 2-0 12-18IN SILK

## (undated) DEVICE — SUT 1 36IN PDS II VIO MONO

## (undated) DEVICE — NDL MONOPTY BIOPSY 18GX20CM

## (undated) DEVICE — SUT 4-0 12-30IN SILK

## (undated) DEVICE — GOWN SURGICAL X-LARGE

## (undated) DEVICE — BLADE SURG CARBON STEEL SZ11

## (undated) DEVICE — SET DECANTER MEDICHOICE

## (undated) DEVICE — ELECTRODE PAD DEFIB STERILE

## (undated) DEVICE — SUT 2/0 30IN SILK BLK BRAI

## (undated) DEVICE — DRESSING ADH ISLAND 3.6 X 14

## (undated) DEVICE — DRAIN CHANNEL ROUND 15FR

## (undated) DEVICE — COVER LIGHT HANDLE 80/CA

## (undated) DEVICE — SEALER LIGASURE MARYLAND 23CM

## (undated) DEVICE — SEE MEDLINE ITEM 146420

## (undated) DEVICE — CATH URETHRAL RED RUBBER 18FR

## (undated) DEVICE — SUT PROLENE 5-0 36IN C-1

## (undated) DEVICE — SUT SILK 2-0 STRANDS 30IN